# Patient Record
Sex: FEMALE | Race: ASIAN | Employment: FULL TIME | URBAN - METROPOLITAN AREA
[De-identification: names, ages, dates, MRNs, and addresses within clinical notes are randomized per-mention and may not be internally consistent; named-entity substitution may affect disease eponyms.]

---

## 2017-04-06 ENCOUNTER — TELEPHONE (OUTPATIENT)
Dept: INTERNAL MEDICINE CLINIC | Facility: CLINIC | Age: 67
End: 2017-04-06

## 2017-04-06 DIAGNOSIS — Z12.31 SCREENING MAMMOGRAM, ENCOUNTER FOR: Primary | ICD-10-CM

## 2017-04-06 DIAGNOSIS — IMO0001 UNCONTROLLED TYPE 2 DIABETES MELLITUS WITHOUT COMPLICATION, WITHOUT LONG-TERM CURRENT USE OF INSULIN: ICD-10-CM

## 2017-04-06 DIAGNOSIS — N95.1 MENOPAUSAL AND FEMALE CLIMACTERIC STATES: ICD-10-CM

## 2017-04-06 NOTE — TELEPHONE ENCOUNTER
I placed the orders. She had dexa order from 2015 she never went for and she still hasn't had the cholesterol that was ordered in September. She needs to have these labs fasting and she is due for f/u appt in May.

## 2017-04-06 NOTE — TELEPHONE ENCOUNTER
Patient would like orders for mammogram ,dexa scan,and a1c and all the other labs before her next appointment .  Call her when ready

## 2017-04-22 RX ORDER — TRIAMTERENE AND HYDROCHLOROTHIAZIDE 37.5; 25 MG/1; MG/1
TABLET ORAL
Qty: 90 TABLET | Refills: 0 | Status: SHIPPED | OUTPATIENT
Start: 2017-04-22 | End: 2017-09-26

## 2017-04-24 ENCOUNTER — TELEPHONE (OUTPATIENT)
Dept: INTERNAL MEDICINE CLINIC | Facility: CLINIC | Age: 67
End: 2017-04-24

## 2017-04-24 NOTE — TELEPHONE ENCOUNTER
US is not a recommended screening for breast cancer, particularly in a patient who has a h/o breast cancer. She needs to have the mammogram, can give her pain meds prior if she needs it.

## 2017-04-24 NOTE — TELEPHONE ENCOUNTER
Pt informed ultrasound is not recommended for pt with history of breast cancer, needs mammogram, pt states she will take aleve for pain. Orders mailed to pt as requested.

## 2017-04-27 ENCOUNTER — TELEPHONE (OUTPATIENT)
Dept: INTERNAL MEDICINE CLINIC | Facility: CLINIC | Age: 67
End: 2017-04-27

## 2017-04-27 ENCOUNTER — OFFICE VISIT (OUTPATIENT)
Dept: INTERNAL MEDICINE CLINIC | Facility: CLINIC | Age: 67
End: 2017-04-27

## 2017-04-27 ENCOUNTER — APPOINTMENT (OUTPATIENT)
Dept: LAB | Age: 67
End: 2017-04-27
Attending: INTERNAL MEDICINE
Payer: MEDICARE

## 2017-04-27 VITALS
TEMPERATURE: 98 F | DIASTOLIC BLOOD PRESSURE: 78 MMHG | HEIGHT: 62 IN | WEIGHT: 159 LBS | SYSTOLIC BLOOD PRESSURE: 136 MMHG | BODY MASS INDEX: 29.26 KG/M2 | OXYGEN SATURATION: 95 % | HEART RATE: 77 BPM

## 2017-04-27 DIAGNOSIS — R05.9 COUGH: ICD-10-CM

## 2017-04-27 DIAGNOSIS — J20.9 ACUTE BRONCHITIS, UNSPECIFIED ORGANISM: Primary | ICD-10-CM

## 2017-04-27 DIAGNOSIS — IMO0001 UNCONTROLLED TYPE 2 DIABETES MELLITUS WITHOUT COMPLICATION, WITHOUT LONG-TERM CURRENT USE OF INSULIN: ICD-10-CM

## 2017-04-27 DIAGNOSIS — E78.2 MIXED HYPERLIPIDEMIA: ICD-10-CM

## 2017-04-27 PROCEDURE — 36415 COLL VENOUS BLD VENIPUNCTURE: CPT

## 2017-04-27 PROCEDURE — 80061 LIPID PANEL: CPT

## 2017-04-27 PROCEDURE — 80053 COMPREHEN METABOLIC PANEL: CPT

## 2017-04-27 PROCEDURE — 83036 HEMOGLOBIN GLYCOSYLATED A1C: CPT

## 2017-04-27 PROCEDURE — 82043 UR ALBUMIN QUANTITATIVE: CPT

## 2017-04-27 PROCEDURE — 99214 OFFICE O/P EST MOD 30 MIN: CPT | Performed by: INTERNAL MEDICINE

## 2017-04-27 PROCEDURE — 82570 ASSAY OF URINE CREATININE: CPT

## 2017-04-27 PROCEDURE — G0463 HOSPITAL OUTPT CLINIC VISIT: HCPCS | Performed by: INTERNAL MEDICINE

## 2017-04-27 RX ORDER — BENZONATATE 100 MG/1
100 CAPSULE ORAL 3 TIMES DAILY PRN
Qty: 20 CAPSULE | Refills: 0 | Status: SHIPPED | OUTPATIENT
Start: 2017-04-27 | End: 2017-05-04

## 2017-04-27 RX ORDER — FENOFIBRATE 134 MG/1
CAPSULE ORAL
Qty: 90 CAPSULE | Refills: 0 | Status: SHIPPED | OUTPATIENT
Start: 2017-04-27 | End: 2017-09-18

## 2017-04-27 RX ORDER — AZITHROMYCIN 250 MG/1
TABLET, FILM COATED ORAL
Qty: 6 TABLET | Refills: 0 | Status: SHIPPED | OUTPATIENT
Start: 2017-04-27 | End: 2017-05-22

## 2017-04-27 NOTE — TELEPHONE ENCOUNTER
Patient calling and requesting antibiotic for cough, congestion, itchy/sore throat. Scheduled appointment with Dr. Sruthi Chávez for 5:15 p.m. today.

## 2017-04-27 NOTE — PROGRESS NOTES
HPI:    Patient ID: Manuel Zhang is a 73year old female. HPI   Pt comes in with complaint of cough, phlegm, sore throat for almost a week and not getting better.    Pt also had labs this am and triglicerides and cholesterol are up, pt days she had stopped examination of genitourinary organs 10/28/2013   • Lump or mass in breast 8/5/2011   • Allergic rhinitis 3/15/2011   • [facility], [facility] 4/27/2012   • Disorder of bone and cartilage 6/26/2012   • Vitamin B12 deficiency anemia 1/ exudate. Eyes: Conjunctivae and EOM are normal. Pupils are equal, round, and reactive to light. Neck: Normal range of motion. Neck supple. Cardiovascular: Normal rate, regular rhythm, normal heart sounds and intact distal pulses.     Pulmonary/Chest:

## 2017-04-27 NOTE — PATIENT INSTRUCTIONS
ASSESSMENT/PLAN:   Acute bronchitis, unspecified organism  (primary encounter diagnosis)- will cover with ab, take as prescribed with food  Cough- cough med,   Mixed hyperlipidemia- will restart the fenofibrate and watch dit we can recheck in 3 months

## 2017-04-28 NOTE — PROGRESS NOTES
Quick Note:    Labs normal, A1C better but the cholesterol is far worse, plase be sure she is taking medications, if no, needs to take, if yes, she will need something else  ______

## 2017-04-28 NOTE — PROGRESS NOTES
Quick Note:    Patient picked up rx for this month and per pharm, she needs to call insurance to see what is cheapest. Patient informed with verbal understanding.  She will call us back with what dosage is cheapest.  ______

## 2017-05-02 ENCOUNTER — TELEPHONE (OUTPATIENT)
Dept: INTERNAL MEDICINE CLINIC | Facility: CLINIC | Age: 67
End: 2017-05-02

## 2017-05-03 NOTE — TELEPHONE ENCOUNTER
Pt states that she is still taking the benzonatate, pt states that she is feeling much better today than she was yesterday and will call back if she feels worse.

## 2017-05-03 NOTE — TELEPHONE ENCOUNTER
She is also taking the tessalon?  Please see how she is, the cough can last longer than the infection

## 2017-05-22 ENCOUNTER — OFFICE VISIT (OUTPATIENT)
Dept: INTERNAL MEDICINE CLINIC | Facility: CLINIC | Age: 67
End: 2017-05-22

## 2017-05-22 VITALS
OXYGEN SATURATION: 96 % | SYSTOLIC BLOOD PRESSURE: 108 MMHG | WEIGHT: 154.81 LBS | TEMPERATURE: 99 F | HEART RATE: 88 BPM | HEIGHT: 62 IN | BODY MASS INDEX: 28.49 KG/M2 | DIASTOLIC BLOOD PRESSURE: 63 MMHG | RESPIRATION RATE: 18 BRPM

## 2017-05-22 DIAGNOSIS — IMO0001 UNCONTROLLED TYPE 2 DIABETES MELLITUS WITHOUT COMPLICATION, WITHOUT LONG-TERM CURRENT USE OF INSULIN: Primary | ICD-10-CM

## 2017-05-22 DIAGNOSIS — G89.29 CHRONIC BILATERAL THORACIC BACK PAIN: ICD-10-CM

## 2017-05-22 DIAGNOSIS — E78.2 MIXED HYPERLIPIDEMIA: ICD-10-CM

## 2017-05-22 DIAGNOSIS — Z12.31 VISIT FOR SCREENING MAMMOGRAM: ICD-10-CM

## 2017-05-22 DIAGNOSIS — I10 HYPERTENSION, BENIGN: ICD-10-CM

## 2017-05-22 DIAGNOSIS — M54.6 CHRONIC BILATERAL THORACIC BACK PAIN: ICD-10-CM

## 2017-05-22 PROCEDURE — G0463 HOSPITAL OUTPT CLINIC VISIT: HCPCS | Performed by: INTERNAL MEDICINE

## 2017-05-22 PROCEDURE — 99214 OFFICE O/P EST MOD 30 MIN: CPT | Performed by: INTERNAL MEDICINE

## 2017-05-22 RX ORDER — ATORVASTATIN CALCIUM 40 MG/1
40 TABLET, FILM COATED ORAL NIGHTLY
Qty: 90 TABLET | Refills: 1 | Status: SHIPPED | OUTPATIENT
Start: 2017-05-22 | End: 2018-04-15

## 2017-05-22 NOTE — PROGRESS NOTES
HPI:    Patient ID: Carmelina Ayala is a 73year old female. HPI  Diabetes  Patient here for follow up of Diabetes. Has been taking medications regularly.     Currently taking no medication, has been exercising more regularly  moderately active   Watches marciano few months. Worse when she wears new shoes. Not worse walking barefoot. Review of Systems   Respiratory: Negative for shortness of breath. Cardiovascular: Negative for chest pain, palpitations and leg swelling.    Gastrointestinal: Positive for heart

## 2017-05-22 NOTE — PATIENT INSTRUCTIONS
ASSESSMENT/PLAN:   Diagnoses and all orders for this visit:    Uncontrolled type 2 diabetes mellitus without complication, without long-term current use of insulin (HCC)  Sugar overall doing better, to continue diet and exercise.    Hypertension, benign  BP

## 2017-07-10 ENCOUNTER — TELEPHONE (OUTPATIENT)
Dept: INTERNAL MEDICINE CLINIC | Facility: CLINIC | Age: 67
End: 2017-07-10

## 2017-07-10 DIAGNOSIS — M25.579 ANKLE PAIN, UNSPECIFIED CHRONICITY, UNSPECIFIED LATERALITY: Primary | ICD-10-CM

## 2017-07-10 NOTE — TELEPHONE ENCOUNTER
patient is requesting to get a referal to see a foot doctor for right ankle pain  She mention she has seen you for this .

## 2017-07-11 ENCOUNTER — TELEPHONE (OUTPATIENT)
Dept: INTERNAL MEDICINE CLINIC | Facility: CLINIC | Age: 67
End: 2017-07-11

## 2017-07-11 DIAGNOSIS — M25.579 ANKLE PAIN, UNSPECIFIED CHRONICITY, UNSPECIFIED LATERALITY: Primary | ICD-10-CM

## 2017-07-11 NOTE — TELEPHONE ENCOUNTER
Per patient Dr. Michael Jones office states he does not work with ankles is there someone else you can refer to

## 2017-08-11 ENCOUNTER — HOSPITAL ENCOUNTER (OUTPATIENT)
Dept: MAMMOGRAPHY | Age: 67
Discharge: HOME | End: 2017-08-11
Attending: INTERNAL MEDICINE
Payer: MEDICARE

## 2017-08-11 ENCOUNTER — HOSPITAL ENCOUNTER (OUTPATIENT)
Dept: BONE DENSITY | Age: 67
Discharge: HOME | End: 2017-08-11
Attending: INTERNAL MEDICINE
Payer: MEDICARE

## 2017-08-11 DIAGNOSIS — N95.1 MENOPAUSAL AND FEMALE CLIMACTERIC STATES: ICD-10-CM

## 2017-08-11 DIAGNOSIS — Z12.31 VISIT FOR SCREENING MAMMOGRAM: ICD-10-CM

## 2017-08-11 PROCEDURE — 77080 DXA BONE DENSITY AXIAL: CPT | Performed by: INTERNAL MEDICINE

## 2017-08-11 PROCEDURE — 77067 SCR MAMMO BI INCL CAD: CPT | Performed by: INTERNAL MEDICINE

## 2017-08-21 ENCOUNTER — TELEPHONE (OUTPATIENT)
Dept: INTERNAL MEDICINE CLINIC | Facility: CLINIC | Age: 67
End: 2017-08-21

## 2017-08-21 NOTE — TELEPHONE ENCOUNTER
Spoke with patient regarding mammogram and bone density tests. Reminded patient to use \"My Chart\" for viewing test results. Patient verbalized understanding.

## 2017-09-18 ENCOUNTER — OFFICE VISIT (OUTPATIENT)
Dept: INTERNAL MEDICINE CLINIC | Facility: CLINIC | Age: 67
End: 2017-09-18

## 2017-09-18 VITALS
HEART RATE: 73 BPM | SYSTOLIC BLOOD PRESSURE: 124 MMHG | TEMPERATURE: 99 F | BODY MASS INDEX: 29.44 KG/M2 | DIASTOLIC BLOOD PRESSURE: 71 MMHG | HEIGHT: 62 IN | WEIGHT: 160 LBS

## 2017-09-18 DIAGNOSIS — R21 RASH: Primary | ICD-10-CM

## 2017-09-18 PROCEDURE — G0463 HOSPITAL OUTPT CLINIC VISIT: HCPCS | Performed by: INTERNAL MEDICINE

## 2017-09-18 PROCEDURE — 99213 OFFICE O/P EST LOW 20 MIN: CPT | Performed by: INTERNAL MEDICINE

## 2017-09-18 RX ORDER — CLOTRIMAZOLE AND BETAMETHASONE DIPROPIONATE 10; .64 MG/G; MG/G
CREAM TOPICAL
Qty: 60 G | Refills: 3 | Status: SHIPPED | OUTPATIENT
Start: 2017-09-18 | End: 2021-05-14

## 2017-09-18 RX ORDER — MELOXICAM 7.5 MG/1
TABLET ORAL
Refills: 1 | COMMUNITY
Start: 2017-07-14 | End: 2018-05-15

## 2017-09-18 NOTE — PROGRESS NOTES
HPI:    Patient ID: Delores Pena is a 78year old female. HPI she came in today complaining of a rash on around her neck.   She states this started approximately 1 month ago after she had a facial, her symptoms progressively got worse she used a gelacio Cream Apply bid Disp: 60 g Rfl: 3   atorvastatin 40 MG Oral Tab Take 1 tablet (40 mg total) by mouth nightly.  Disp: 90 tablet Rfl: 1   TRIAMTERENE-HCTZ 37.5-25 MG Oral Tab TAKE 1 TABLET BY MOUTH DAILY Disp: 90 tablet Rfl: 0   latanoprost (XALATAN) 0.005 % deficiency anemia 1/3/2008   • Vitamin D deficiency 10/31/2011      Past Surgical History:  9/27/16: HYSTERECTOMY      Comment: [facility] BSO hyperplasia, no CA  2010: LUMPECTOMY RIGHT  2011: KEARA BIOPSY STEREOTACTIC NODULE 2 SITE BILAT   Family History   Problem tenderness present. No tracheal deviation present. No thyroid mass and no thyromegaly present. Cardiovascular: Normal rate, regular rhythm, normal heart sounds and intact distal pulses. Exam reveals no gallop and no friction rub. No murmur heard.   Pu

## 2017-09-26 RX ORDER — TRIAMTERENE AND HYDROCHLOROTHIAZIDE 37.5; 25 MG/1; MG/1
TABLET ORAL
Qty: 90 TABLET | Refills: 0 | Status: SHIPPED | OUTPATIENT
Start: 2017-09-26 | End: 2017-12-31

## 2017-10-05 ENCOUNTER — TELEPHONE (OUTPATIENT)
Dept: INTERNAL MEDICINE CLINIC | Facility: CLINIC | Age: 67
End: 2017-10-05

## 2017-10-05 NOTE — TELEPHONE ENCOUNTER
Attempted to schedule a Medicare Annual Wellness Exam with PCP, Kandis ext [phone]. Patient eligible at this time. Thank you.

## 2017-11-03 ENCOUNTER — OFFICE VISIT (OUTPATIENT)
Dept: INTERNAL MEDICINE CLINIC | Facility: CLINIC | Age: 67
End: 2017-11-03

## 2017-11-03 ENCOUNTER — TELEPHONE (OUTPATIENT)
Dept: INTERNAL MEDICINE CLINIC | Facility: CLINIC | Age: 67
End: 2017-11-03

## 2017-11-03 VITALS
HEART RATE: 85 BPM | HEIGHT: 63.75 IN | DIASTOLIC BLOOD PRESSURE: 80 MMHG | WEIGHT: 160 LBS | SYSTOLIC BLOOD PRESSURE: 154 MMHG | BODY MASS INDEX: 27.65 KG/M2

## 2017-11-03 DIAGNOSIS — M25.512 ACUTE PAIN OF LEFT SHOULDER: Primary | ICD-10-CM

## 2017-11-03 PROCEDURE — 99213 OFFICE O/P EST LOW 20 MIN: CPT | Performed by: INTERNAL MEDICINE

## 2017-11-03 PROCEDURE — G0463 HOSPITAL OUTPT CLINIC VISIT: HCPCS | Performed by: INTERNAL MEDICINE

## 2017-11-03 RX ORDER — TIZANIDINE 2 MG/1
2 TABLET ORAL EVERY 6 HOURS PRN
Qty: 30 TABLET | Refills: 0 | Status: SHIPPED | OUTPATIENT
Start: 2017-11-03 | End: 2018-05-15

## 2017-11-03 NOTE — PATIENT INSTRUCTIONS
ASSESSMENT/PLAN:   Diagnoses and all orders for this visit:    Acute pain of left shoulder    Patient with acute pain left shoulder, may have been aggravated by activity yesterday at work. Recommend she report this at her work.  Will give her muscle relaxer

## 2017-11-03 NOTE — TELEPHONE ENCOUNTER
Patient c/o left arm/shoulder pain taking meloxicam without relief  Denies SOB, CP, Dizziness or jaw pain. Made appt for today.

## 2017-11-03 NOTE — PROGRESS NOTES
HPI:    Patient ID: Zackary Leon is a 78year old female. Shoulder Pain    The pain is present in the right shoulder. This is a new problem. The current episode started yesterday.  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 ASSESSMENT/PLAN:   Diagnoses and all orders for this visit:    Acute pain of left shoulder    Patient with acute pain left shoulder, may have been aggravated by activity yesterday at work.  Recommend she report this at her

## 2017-11-04 RX ORDER — TIZANIDINE 2 MG/1
TABLET ORAL
Qty: 385 TABLET | Refills: 0 | OUTPATIENT
Start: 2017-11-04

## 2017-11-06 ENCOUNTER — OFFICE VISIT (OUTPATIENT)
Dept: INTERNAL MEDICINE CLINIC | Facility: CLINIC | Age: 67
End: 2017-11-06

## 2017-11-06 VITALS
HEART RATE: 86 BPM | SYSTOLIC BLOOD PRESSURE: 131 MMHG | TEMPERATURE: 98 F | WEIGHT: 162 LBS | DIASTOLIC BLOOD PRESSURE: 74 MMHG | BODY MASS INDEX: 28 KG/M2

## 2017-11-06 DIAGNOSIS — L72.0 MILIA: ICD-10-CM

## 2017-11-06 DIAGNOSIS — I10 HYPERTENSION, BENIGN: ICD-10-CM

## 2017-11-06 DIAGNOSIS — E78.00 HYPERCHOLESTEROLEMIA: ICD-10-CM

## 2017-11-06 DIAGNOSIS — H40.9 GLAUCOMA, UNSPECIFIED GLAUCOMA TYPE, UNSPECIFIED LATERALITY: ICD-10-CM

## 2017-11-06 DIAGNOSIS — D51.9 ANEMIA DUE TO VITAMIN B12 DEFICIENCY, UNSPECIFIED B12 DEFICIENCY TYPE: ICD-10-CM

## 2017-11-06 DIAGNOSIS — L80 VITILIGO: ICD-10-CM

## 2017-11-06 DIAGNOSIS — IMO0001 UNCONTROLLED TYPE 2 DIABETES MELLITUS WITHOUT COMPLICATION, WITHOUT LONG-TERM CURRENT USE OF INSULIN: ICD-10-CM

## 2017-11-06 DIAGNOSIS — G47.30 SLEEP-DISORDERED BREATHING: ICD-10-CM

## 2017-11-06 DIAGNOSIS — J30.2 CHRONIC SEASONAL ALLERGIC RHINITIS, UNSPECIFIED TRIGGER: ICD-10-CM

## 2017-11-06 DIAGNOSIS — Z78.0 POSTMENOPAUSAL STATUS (AGE-RELATED) (NATURAL): ICD-10-CM

## 2017-11-06 DIAGNOSIS — E55.9 VITAMIN D DEFICIENCY: ICD-10-CM

## 2017-11-06 DIAGNOSIS — R76.11 POSITIVE PPD: ICD-10-CM

## 2017-11-06 DIAGNOSIS — D05.91: ICD-10-CM

## 2017-11-06 DIAGNOSIS — R93.89 ENDOMETRIAL THICKENING ON ULTRA SOUND: ICD-10-CM

## 2017-11-06 DIAGNOSIS — Z00.00 ENCOUNTER FOR ANNUAL HEALTH EXAMINATION: Primary | ICD-10-CM

## 2017-11-06 PROBLEM — R21 RASH: Status: RESOLVED | Noted: 2017-09-18 | Resolved: 2017-11-06

## 2017-11-06 PROCEDURE — 96160 PT-FOCUSED HLTH RISK ASSMT: CPT | Performed by: INTERNAL MEDICINE

## 2017-11-06 PROCEDURE — 90653 IIV ADJUVANT VACCINE IM: CPT | Performed by: INTERNAL MEDICINE

## 2017-11-06 PROCEDURE — G0008 ADMIN INFLUENZA VIRUS VAC: HCPCS | Performed by: INTERNAL MEDICINE

## 2017-11-06 NOTE — PATIENT INSTRUCTIONS
Beatriz Rosario's SCREENING SCHEDULE   Tests on this list are recommended by your physician but may not be covered, or covered at this frequency, by your insurer. Please check with your insurance carrier before scheduling to verify coverage.    REYNA (mg/dL)   Date Value   04/27/2017 260 (H)   07/18/2015 143     Triglycerides (mg/dL)   Date Value   04/27/2017 535 (H)   07/18/2015 129        EKG - covered if needed at Welcome to Medicare, and non-screening if indicated for medical reasons Electrocardiog anniversary or as ordered in After Visit Summary   Pap and Pelvic      Pap: Every 3 yrs age 21-65 or Pap+HPV every 5 yrs age 33-67, Covered every 2 yrs up to age 79 or Yearly if High Risk   There are no preventive care reminders to display for this patient Directives    [email or web address] org/publications/[email or web address]/personal_[email or web address]. pdf  An information packet, including necessary form from the [employer] website. http://[email or web address] [place]state. [place]/public/books/advin.htm  A link to the [place]

## 2017-11-06 NOTE — PROGRESS NOTES
HPI:   Pepper Moyer is a 78year old female who presents for a Medicare Initial Annual Wellness visit (Once after 12 month Medicare anniversary) .     Annual Physical due on 11/14/2017        Patient Care Team: Patient Care Team:  Sharla Rosario, mg total) by mouth every 6 (six) hours as needed.    TRIAMTERENE-HCTZ 37.5-25 MG Oral Tab TAKE 1 TABLET BY MOUTH DAILY   Meloxicam 7.5 MG Oral Tab    clotrimazole-betamethasone 1-0.05 % External Cream Apply bid   atorvastatin 40 MG Oral Tab Take 1 tablet (4 her father. SOCIAL HISTORY:   She  reports that she has never smoked. She has never used smokeless tobacco. She reports that she does not drink alcohol or use drugs.      REVIEW OF SYSTEMS:   GENERAL: feels well otherwise, c/o fatigue for 'a long time' (eva Hearing Assessment  (Required for AWV/SWV)    Hearing Screening    Screening Method:  Whisper Test  Whisper Test Result:  Pass          Visual Acuity  Right Eye Visual Acuity: Uncorrected Right Eye Chart Acuity: 20/25   Left Eye Visual Acuity: Uncorrected Pneumococcal, Zoster, Tetanus     Immunization History   Administered Date(s) Administered   • >=3 YRS FLUZONE TRI PRESERV FREE SINGLE DOSE (13762) FLU CLINIC 01/06/2015   • HIGH DOSE FLU 65 YRS AND OLDER PRSV FREE SINGLE D (35629) FLU CLINIC 11/14/2016 study.  Vitiligo  Small area of vitiligo on  the left arm will see Dr. Soniya Newton  Reassured patient as to the benign nature of these  Other orders  -     FLU VACCINE ADJUVANT IM         Ms. Rosario already takes aspirin and has it on her medication list. with daily activities? : No    Memory Problems?: No      Fall/Risk Assessment          Have you fallen in the last 12 months?: 0-No                                        Fall/Risk Scorin          Depression Screening (PHQ-2/PHQ-9): Over the LAST 2 WEE Hyperlipidemia, or Type II Diabetes Mellitus. Calculated LDL will not be reported when TG >400 mg/dl.      07/18/2015 72        EKG - w/ Initial Preventative Physical Exam only, or if medically necessary Electrocardiogram date09/14/2016       Colorectal Persons who live in the same house as a HepB virus carrier   Homosexual men   Illicit injectable drug abusers     Tetanus Toxoid  Only covered with a cut with metal- TD and TDaP Not covered by Medicare Part B No vaccine history found This may be covered flowsheet data found. Dilated Eye exam  Annually Data entered on: 11/14/2016   Last Dilated Eye Exam 11/14/2016     No flowsheet data found. COPD      Spirometry Testing Annually Spirometry date:  No flowsheet data found.          Template: POLY GAMLBE

## 2017-11-17 ENCOUNTER — TELEPHONE (OUTPATIENT)
Dept: INTERNAL MEDICINE CLINIC | Facility: CLINIC | Age: 67
End: 2017-11-17

## 2017-11-17 DIAGNOSIS — IMO0001 UNCONTROLLED TYPE 2 DIABETES MELLITUS WITHOUT COMPLICATION, WITHOUT LONG-TERM CURRENT USE OF INSULIN: Primary | ICD-10-CM

## 2017-12-07 ENCOUNTER — TELEPHONE (OUTPATIENT)
Dept: INTERNAL MEDICINE CLINIC | Facility: CLINIC | Age: 67
End: 2017-12-07

## 2017-12-07 RX ORDER — AMOXICILLIN 875 MG/1
875 TABLET, COATED ORAL 2 TIMES DAILY
Qty: 20 TABLET | Refills: 0 | Status: SHIPPED | OUTPATIENT
Start: 2017-12-07 | End: 2018-05-25 | Stop reason: ALTCHOICE

## 2017-12-07 NOTE — TELEPHONE ENCOUNTER
C/o sick x 5 days sore throat, hoarse, can hardly speak, no fever, cough, fatigue, OTC not helping.   Would like antibiotic sent to pharmacy

## 2017-12-08 NOTE — TELEPHONE ENCOUNTER
Prescription sent to pharmacy. Patient informed to finish full course of antibiotic treatment and to call office if not feeling better. Patient verbalized understanding, denies further questions.

## 2017-12-11 ENCOUNTER — HOSPITAL ENCOUNTER (OUTPATIENT)
Dept: GENERAL RADIOLOGY | Age: 67
Discharge: HOME | End: 2017-12-11
Attending: INTERNAL MEDICINE
Payer: MEDICARE

## 2017-12-11 ENCOUNTER — OFFICE VISIT (OUTPATIENT)
Dept: INTERNAL MEDICINE CLINIC | Facility: CLINIC | Age: 67
End: 2017-12-11

## 2017-12-11 VITALS
WEIGHT: 159 LBS | HEART RATE: 93 BPM | OXYGEN SATURATION: 95 % | TEMPERATURE: 99 F | DIASTOLIC BLOOD PRESSURE: 70 MMHG | HEIGHT: 63 IN | BODY MASS INDEX: 28.17 KG/M2 | SYSTOLIC BLOOD PRESSURE: 135 MMHG

## 2017-12-11 DIAGNOSIS — M79.675 GREAT TOE PAIN, LEFT: ICD-10-CM

## 2017-12-11 DIAGNOSIS — R05.9 COUGH: Primary | ICD-10-CM

## 2017-12-11 PROCEDURE — 99213 OFFICE O/P EST LOW 20 MIN: CPT | Performed by: INTERNAL MEDICINE

## 2017-12-11 PROCEDURE — G0463 HOSPITAL OUTPT CLINIC VISIT: HCPCS | Performed by: INTERNAL MEDICINE

## 2017-12-11 PROCEDURE — 73630 X-RAY EXAM OF FOOT: CPT | Performed by: INTERNAL MEDICINE

## 2017-12-11 RX ORDER — BENZONATATE 200 MG/1
200 CAPSULE ORAL 3 TIMES DAILY PRN
Qty: 20 CAPSULE | Refills: 0 | Status: SHIPPED | OUTPATIENT
Start: 2017-12-11 | End: 2018-05-25 | Stop reason: ALTCHOICE

## 2017-12-11 NOTE — PROGRESS NOTES
HPI:    Patient ID: Madi Newton is a 78year old female. Cough   This is a new problem. Episode onset: 1 week. The problem has been gradually improving.  The cough is non-productive (was initially productive, now better after starting the antibiotic take 1 tablet (81MG)  by ORAL route  every day Disp:  Rfl:      Allergies:  Latex                     PHYSICAL EXAM:    Physical Exam   Vitals reviewed. Constitutional: She appears well-developed and well-nourished.    Cardiovascular: Normal rate and regu

## 2017-12-11 NOTE — PATIENT INSTRUCTIONS
ASSESSMENT/PLAN:   Diagnoses and all orders for this visit:    Cough  Patient seems to be improving with the antibiotic. Will add cough med  Great toe pain, left  -     XR FOOT (2 VIEW), LEFT (CPT=73620);  Future  -     URIC ACID, SERUM; Future  Patient wit

## 2017-12-12 ENCOUNTER — TELEPHONE (OUTPATIENT)
Dept: INTERNAL MEDICINE CLINIC | Facility: CLINIC | Age: 67
End: 2017-12-12

## 2017-12-12 RX ORDER — INDOMETHACIN 25 MG/1
25 CAPSULE ORAL 3 TIMES DAILY PRN
Qty: 30 CAPSULE | Refills: 0 | Status: SHIPPED | OUTPATIENT
Start: 2017-12-12 | End: 2018-05-25

## 2017-12-12 NOTE — TELEPHONE ENCOUNTER
Pt has been informed of new medication and to stop the meloxicam and to ice the area, pt was also informed that if this does not help she would have to see ortho, pt voiced understanding.

## 2017-12-12 NOTE — TELEPHONE ENCOUNTER
Patient states she is in a lot of pain from her ankle meloxican is not working very painful and swollen

## 2017-12-12 NOTE — TELEPHONE ENCOUNTER
Recommend using ice to the area. Can change to indocin tid prn (stop the meloxicam).  If not better, would need to see ortho

## 2017-12-13 RX ORDER — INDOMETHACIN 25 MG/1
CAPSULE ORAL
Qty: 270 CAPSULE | Refills: 0 | OUTPATIENT
Start: 2017-12-13

## 2017-12-22 ENCOUNTER — PATIENT OUTREACH (OUTPATIENT)
Dept: CASE MANAGEMENT | Age: 67
End: 2017-12-22

## 2017-12-22 NOTE — PROGRESS NOTES
Outreached to patient in regards to enrollment to Chronic Care Management program. Patient stated that she is doing well and not on many meds so at this time patient \"Declined. \" I informed patient I will mail out letter for future reference and patient a

## 2018-01-01 RX ORDER — TRIAMTERENE AND HYDROCHLOROTHIAZIDE 37.5; 25 MG/1; MG/1
TABLET ORAL
Qty: 90 TABLET | Refills: 1 | Status: SHIPPED | OUTPATIENT
Start: 2018-01-01 | End: 2018-05-25

## 2018-01-03 ENCOUNTER — TELEPHONE (OUTPATIENT)
Dept: INTERNAL MEDICINE CLINIC | Facility: CLINIC | Age: 68
End: 2018-01-03

## 2018-01-03 DIAGNOSIS — R21 RASH: Primary | ICD-10-CM

## 2018-01-03 NOTE — TELEPHONE ENCOUNTER
Patient calling to request referral for Dr Immanuel Maria patient is seeing doctor for follow up rash white spots on face. Patient has appointment on Friday.

## 2018-02-15 ENCOUNTER — TELEPHONE (OUTPATIENT)
Dept: INTERNAL MEDICINE CLINIC | Facility: CLINIC | Age: 68
End: 2018-02-15

## 2018-02-15 DIAGNOSIS — M79.676 PAIN OF TOE, UNSPECIFIED LATERALITY: Primary | ICD-10-CM

## 2018-03-09 ENCOUNTER — TELEPHONE (OUTPATIENT)
Dept: INTERNAL MEDICINE CLINIC | Facility: CLINIC | Age: 68
End: 2018-03-09

## 2018-03-09 DIAGNOSIS — R42 VERTIGO: Primary | ICD-10-CM

## 2018-03-10 ENCOUNTER — LAB ENCOUNTER (OUTPATIENT)
Dept: LAB | Age: 68
End: 2018-03-10
Attending: INTERNAL MEDICINE
Payer: MEDICARE

## 2018-03-10 DIAGNOSIS — M79.675 GREAT TOE PAIN, LEFT: ICD-10-CM

## 2018-03-10 DIAGNOSIS — E55.9 VITAMIN D DEFICIENCY: ICD-10-CM

## 2018-03-10 DIAGNOSIS — E78.2 MIXED HYPERLIPIDEMIA: ICD-10-CM

## 2018-03-10 DIAGNOSIS — IMO0001 UNCONTROLLED TYPE 2 DIABETES MELLITUS WITHOUT COMPLICATION, WITHOUT LONG-TERM CURRENT USE OF INSULIN: ICD-10-CM

## 2018-03-10 DIAGNOSIS — D51.9 ANEMIA DUE TO VITAMIN B12 DEFICIENCY, UNSPECIFIED B12 DEFICIENCY TYPE: ICD-10-CM

## 2018-03-10 LAB
ALBUMIN SERPL BCP-MCNC: 4.4 G/DL (ref 3.5–4.8)
ALBUMIN/GLOB SERPL: 1.7 {RATIO} (ref 1–2)
ALP SERPL-CCNC: 70 U/L (ref 32–100)
ALT SERPL-CCNC: 26 U/L (ref 14–54)
ANION GAP SERPL CALC-SCNC: 10 MMOL/L (ref 0–18)
AST SERPL-CCNC: 28 U/L (ref 15–41)
BILIRUB SERPL-MCNC: 0.6 MG/DL (ref 0.3–1.2)
BUN SERPL-MCNC: 9 MG/DL (ref 8–20)
BUN/CREAT SERPL: 13.4 (ref 10–20)
CALCIUM SERPL-MCNC: 9.2 MG/DL (ref 8.5–10.5)
CHLORIDE SERPL-SCNC: 99 MMOL/L (ref 95–110)
CHOLEST SERPL-MCNC: 164 MG/DL (ref 110–200)
CO2 SERPL-SCNC: 27 MMOL/L (ref 22–32)
CREAT SERPL-MCNC: 0.67 MG/DL (ref 0.5–1.5)
CREAT UR-MCNC: 39.9 MG/DL
GLOBULIN PLAS-MCNC: 2.6 G/DL (ref 2.5–3.7)
GLUCOSE SERPL-MCNC: 104 MG/DL (ref 70–99)
HDLC SERPL-MCNC: 57 MG/DL
LDLC SERPL CALC-MCNC: 56 MG/DL (ref 0–99)
MICROALBUMIN UR-MCNC: 1.1 MG/DL (ref 0–1.8)
MICROALBUMIN/CREAT UR: 27.6 MG/G{CREAT} (ref 0–20)
NONHDLC SERPL-MCNC: 107 MG/DL
OSMOLALITY UR CALC.SUM OF ELEC: 281 MOSM/KG (ref 275–295)
PATIENT FASTING: YES
POTASSIUM SERPL-SCNC: 4.1 MMOL/L (ref 3.3–5.1)
PROT SERPL-MCNC: 7 G/DL (ref 5.9–8.4)
SODIUM SERPL-SCNC: 136 MMOL/L (ref 136–144)
TRIGL SERPL-MCNC: 257 MG/DL (ref 1–149)
URATE SERPL-MCNC: 8.2 MG/DL (ref 2.1–7.4)
VIT B12 SERPL-MCNC: 570 PG/ML (ref 181–914)

## 2018-03-10 PROCEDURE — 84550 ASSAY OF BLOOD/URIC ACID: CPT

## 2018-03-10 PROCEDURE — 82607 VITAMIN B-12: CPT

## 2018-03-10 PROCEDURE — 83036 HEMOGLOBIN GLYCOSYLATED A1C: CPT

## 2018-03-10 PROCEDURE — 36415 COLL VENOUS BLD VENIPUNCTURE: CPT

## 2018-03-10 PROCEDURE — 82306 VITAMIN D 25 HYDROXY: CPT

## 2018-03-10 PROCEDURE — 80061 LIPID PANEL: CPT

## 2018-03-10 PROCEDURE — 82570 ASSAY OF URINE CREATININE: CPT

## 2018-03-10 PROCEDURE — 80053 COMPREHEN METABOLIC PANEL: CPT

## 2018-03-10 PROCEDURE — 82043 UR ALBUMIN QUANTITATIVE: CPT

## 2018-03-11 LAB — HBA1C MFR BLD: 6.5 % (ref 4–6)

## 2018-03-12 LAB — 25(OH)D3 SERPL-MCNC: 26 NG/ML

## 2018-03-13 ENCOUNTER — TELEPHONE (OUTPATIENT)
Dept: INTERNAL MEDICINE CLINIC | Facility: CLINIC | Age: 68
End: 2018-03-13

## 2018-03-16 ENCOUNTER — TELEPHONE (OUTPATIENT)
Dept: INTERNAL MEDICINE CLINIC | Facility: CLINIC | Age: 68
End: 2018-03-16

## 2018-03-16 RX ORDER — INDOMETHACIN 25 MG/1
25 CAPSULE ORAL 3 TIMES DAILY PRN
Qty: 30 CAPSULE | Refills: 0 | Status: CANCELLED | OUTPATIENT
Start: 2018-03-16

## 2018-03-16 NOTE — TELEPHONE ENCOUNTER
Pt states that she continues to have some pain on her Lt big toe pain scale: 3 and still swollen. She already viewed gout test results via [facility] and is requesting medications.

## 2018-03-19 ENCOUNTER — TELEPHONE (OUTPATIENT)
Dept: INTERNAL MEDICINE CLINIC | Facility: CLINIC | Age: 68
End: 2018-03-19

## 2018-03-19 NOTE — TELEPHONE ENCOUNTER
Patient states Uric acid was high and podiatrist suggested she start medication which would need to be given by PCP. Please advise.

## 2018-03-20 NOTE — TELEPHONE ENCOUNTER
She can't start a medication for gout until she is at least 1-2 months over her attack, this can actually make her attack worse.  Should f/u in a month and we can review possible treatments

## 2018-03-20 NOTE — TELEPHONE ENCOUNTER
Patient informed should not take allopurinol until 1-2 months after gout attack. Made appt for 4/13/18 to follow up  Request lab results mailed to her done.

## 2018-03-20 NOTE — TELEPHONE ENCOUNTER
Can take up to 3 times daily with food but if no pain and just red, she doesn't need to continue.  OK to refill if needed

## 2018-04-15 RX ORDER — ATORVASTATIN CALCIUM 40 MG/1
TABLET, FILM COATED ORAL
Qty: 90 TABLET | Refills: 0 | Status: SHIPPED | OUTPATIENT
Start: 2018-04-15 | End: 2018-05-22

## 2018-04-15 NOTE — TELEPHONE ENCOUNTER
Refilled per Epic protocol.     Cholesterol Medications  Protocol Criteria:  · Appointment scheduled in the past 12 months or in the next 3 months  · ALT & LDL on file in the past 12 months  · ALT result < 80  · LDL result <130   Recent Outpatient Visits

## 2018-05-11 ENCOUNTER — PATIENT OUTREACH (OUTPATIENT)
Dept: INTERNAL MEDICINE CLINIC | Facility: CLINIC | Age: 68
End: 2018-05-11

## 2018-05-11 NOTE — PROGRESS NOTES
Patient is eligible for a 2018 Annual Medicare Health Assessment. Discussed in detail w/patient. Appt scheduled for 6/15/18.

## 2018-05-15 ENCOUNTER — OFFICE VISIT (OUTPATIENT)
Dept: INTERNAL MEDICINE CLINIC | Facility: CLINIC | Age: 68
End: 2018-05-15

## 2018-05-15 ENCOUNTER — TELEPHONE (OUTPATIENT)
Dept: INTERNAL MEDICINE CLINIC | Facility: CLINIC | Age: 68
End: 2018-05-15

## 2018-05-15 VITALS
TEMPERATURE: 98 F | BODY MASS INDEX: 28 KG/M2 | DIASTOLIC BLOOD PRESSURE: 63 MMHG | SYSTOLIC BLOOD PRESSURE: 134 MMHG | HEIGHT: 63.75 IN | WEIGHT: 162 LBS | HEART RATE: 77 BPM

## 2018-05-15 DIAGNOSIS — M54.50 ACUTE BILATERAL LOW BACK PAIN WITHOUT SCIATICA: Primary | ICD-10-CM

## 2018-05-15 DIAGNOSIS — T14.8XXA MUSCLE STRAIN: ICD-10-CM

## 2018-05-15 PROCEDURE — G0463 HOSPITAL OUTPT CLINIC VISIT: HCPCS | Performed by: INTERNAL MEDICINE

## 2018-05-15 PROCEDURE — 99214 OFFICE O/P EST MOD 30 MIN: CPT | Performed by: INTERNAL MEDICINE

## 2018-05-15 RX ORDER — MELOXICAM 7.5 MG/1
7.5 TABLET ORAL DAILY PRN
Qty: 30 TABLET | Refills: 0 | Status: SHIPPED | OUTPATIENT
Start: 2018-05-15 | End: 2018-05-15

## 2018-05-15 RX ORDER — CYCLOBENZAPRINE HCL 5 MG
5 TABLET ORAL 2 TIMES DAILY PRN
Qty: 30 TABLET | Refills: 0 | Status: SHIPPED | OUTPATIENT
Start: 2018-05-15 | End: 2018-05-30

## 2018-05-15 RX ORDER — PREDNISONE 1 MG/1
5 TABLET ORAL 2 TIMES DAILY
Qty: 10 TABLET | Refills: 0 | Status: SHIPPED | OUTPATIENT
Start: 2018-05-15 | End: 2018-05-20

## 2018-05-15 NOTE — TELEPHONE ENCOUNTER
Patient called c/o back pain x 2 days getting worse is 7 on pain scale. Denies numbness or tingling.  Asking for appt made appt for today

## 2018-05-15 NOTE — PROGRESS NOTES
HPI:    Patient ID: Dain Morales is a 78year old female. HPI   Patient comes in with complaint of low back pain she woke up with it bilateral side no radiation down the buttock she is worried that she has gout she has history of gout before.     Rev mouth 2 (two) times daily.  For 10 days Disp: 20 tablet Rfl: 0     Allergies:  Latex                       HISTORY:  Past Medical History:   Diagnosis Date   • Allergic rhinitis 3/15/2011   • Breast nodule 2010    stereotactic rt breast biopsy   • Breast no doing fine   • Breast Cancer Maternal Aunt    • Breast Cancer Self 62      Social History: Smoking status: Never Smoker                                                              Smokeless tobacco: Never Used                      Alcohol use:  No

## 2018-05-15 NOTE — PATIENT INSTRUCTIONS
ASSESSMENT/PLAN:   Acute bilateral low back pain without sciatica  (primary encounter diagnosis) will treat with steroids for a few days muscle relaxant warm compression let us know if not better  Muscle strain as above also meloxicam as needed no more channing

## 2018-05-16 ENCOUNTER — TELEPHONE (OUTPATIENT)
Dept: INTERNAL MEDICINE CLINIC | Facility: CLINIC | Age: 68
End: 2018-05-16

## 2018-05-16 RX ORDER — MELOXICAM 7.5 MG/1
TABLET ORAL
Qty: 90 TABLET | Refills: 0 | Status: SHIPPED | OUTPATIENT
Start: 2018-05-16 | End: 2018-05-22

## 2018-05-22 RX ORDER — MELOXICAM 7.5 MG/1
TABLET ORAL
Qty: 90 TABLET | Refills: 0 | Status: SHIPPED | OUTPATIENT
Start: 2018-05-22 | End: 2018-05-25

## 2018-05-22 RX ORDER — ATORVASTATIN CALCIUM 40 MG/1
TABLET, FILM COATED ORAL
Qty: 90 TABLET | Refills: 0 | Status: SHIPPED | OUTPATIENT
Start: 2018-05-22 | End: 2018-05-25

## 2018-05-24 ENCOUNTER — TELEPHONE (OUTPATIENT)
Dept: INTERNAL MEDICINE CLINIC | Facility: CLINIC | Age: 68
End: 2018-05-24

## 2018-05-25 RX ORDER — BLOOD-GLUCOSE METER
EACH MISCELLANEOUS
Qty: 1 DEVICE | Refills: 0 | Status: SHIPPED | OUTPATIENT
Start: 2018-05-25 | End: 2021-03-10

## 2018-05-25 RX ORDER — ATORVASTATIN CALCIUM 40 MG/1
TABLET, FILM COATED ORAL
Qty: 90 TABLET | Refills: 0 | Status: SHIPPED | OUTPATIENT
Start: 2018-05-25 | End: 2018-11-14

## 2018-05-25 RX ORDER — TRIAMTERENE AND HYDROCHLOROTHIAZIDE 37.5; 25 MG/1; MG/1
1 TABLET ORAL
Qty: 90 TABLET | Refills: 1 | Status: SHIPPED | OUTPATIENT
Start: 2018-05-25 | End: 2018-11-02

## 2018-05-25 RX ORDER — CALCIUM CITRATE/VITAMIN D3 200MG-6.25
TABLET ORAL
Qty: 100 STRIP | Refills: 3 | Status: SHIPPED | OUTPATIENT
Start: 2018-05-25 | End: 2020-05-07

## 2018-05-25 RX ORDER — MELOXICAM 7.5 MG/1
TABLET ORAL
Qty: 90 TABLET | Refills: 0 | Status: SHIPPED | OUTPATIENT
Start: 2018-05-25 | End: 2021-05-14

## 2018-05-30 ENCOUNTER — OFFICE VISIT (OUTPATIENT)
Dept: INTERNAL MEDICINE CLINIC | Facility: CLINIC | Age: 68
End: 2018-05-30

## 2018-05-30 VITALS
TEMPERATURE: 98 F | BODY MASS INDEX: 27 KG/M2 | OXYGEN SATURATION: 97 % | SYSTOLIC BLOOD PRESSURE: 112 MMHG | DIASTOLIC BLOOD PRESSURE: 63 MMHG | HEART RATE: 80 BPM | WEIGHT: 157 LBS

## 2018-05-30 DIAGNOSIS — E11.9 TYPE 2 DIABETES MELLITUS WITHOUT COMPLICATION, WITHOUT LONG-TERM CURRENT USE OF INSULIN (HCC): Primary | ICD-10-CM

## 2018-05-30 DIAGNOSIS — Z12.11 ENCOUNTER FOR SCREENING COLONOSCOPY: ICD-10-CM

## 2018-05-30 DIAGNOSIS — C50.911 HORMONE RECEPTOR POSITIVE BREAST CANCER, RIGHT (HCC): ICD-10-CM

## 2018-05-30 DIAGNOSIS — I10 HYPERTENSION, BENIGN: ICD-10-CM

## 2018-05-30 PROCEDURE — G0009 ADMIN PNEUMOCOCCAL VACCINE: HCPCS | Performed by: INTERNAL MEDICINE

## 2018-05-30 PROCEDURE — 90732 PPSV23 VACC 2 YRS+ SUBQ/IM: CPT | Performed by: INTERNAL MEDICINE

## 2018-05-30 PROCEDURE — G0463 HOSPITAL OUTPT CLINIC VISIT: HCPCS | Performed by: INTERNAL MEDICINE

## 2018-05-30 PROCEDURE — 99214 OFFICE O/P EST MOD 30 MIN: CPT | Performed by: INTERNAL MEDICINE

## 2018-05-30 RX ORDER — DIPHENHYDRAMINE HCL 25 MG
TABLET ORAL
COMMUNITY
Start: 2018-05-29 | End: 2018-05-30

## 2018-05-30 NOTE — PROGRESS NOTES
HPI:    Patient ID: Jose J Rojo is a 78year old female. HPI  Diabetes  Patient here for follow up of Diabetes. Has been taking medications regularly. Currently taking no meds  Checks sugars 1 times daily. Fasting sugars average low 100s.    ex METRIX AIR GLUCOSE METER) Does not apply Device Test sugar daily Disp: 1 Device Rfl: 0   Glucose Blood (TRUE METRIX BLOOD GLUCOSE TEST) In Vitro Strip Test sugar daily Disp: 100 strip Rfl: 3   clotrimazole-betamethasone 1-0.05 % External Cream Apply bid Aki Vtial

## 2018-05-30 NOTE — PATIENT INSTRUCTIONS
ASSESSMENT/PLAN:   Hypertension, benign  BP overall doing very well, continue current meds and regular exercise. Type II diabetes mellitus, uncontrolled ([address]  Patient overall doing well with her sugar doing regular exercise and watching her diet.

## 2018-06-12 PROBLEM — E78.5 TYPE 2 DIABETES MELLITUS WITH HYPERLIPIDEMIA (HCC): Status: ACTIVE | Noted: 2018-06-12

## 2018-06-12 PROBLEM — E11.69 TYPE 2 DIABETES MELLITUS WITH HYPERLIPIDEMIA (HCC): Status: ACTIVE | Noted: 2018-06-12

## 2018-06-12 PROBLEM — E78.5 TYPE 2 DIABETES MELLITUS WITH HYPERLIPIDEMIA: Status: ACTIVE | Noted: 2018-06-12

## 2018-06-12 PROBLEM — E11.69 TYPE 2 DIABETES MELLITUS WITH HYPERLIPIDEMIA  (HCC): Status: ACTIVE | Noted: 2018-06-12

## 2018-06-12 PROBLEM — M47.816 LUMBAR ARTHROPATHY: Status: ACTIVE | Noted: 2018-06-12

## 2018-06-12 PROBLEM — E11.69 TYPE 2 DIABETES MELLITUS WITH HYPERLIPIDEMIA: Status: ACTIVE | Noted: 2018-06-12

## 2018-06-12 PROBLEM — I70.0 ATHEROSCLEROSIS OF AORTA (HCC): Status: ACTIVE | Noted: 2018-06-12

## 2018-06-12 PROBLEM — E79.0 HYPERURICEMIA: Status: ACTIVE | Noted: 2018-06-12

## 2018-06-12 PROBLEM — E78.5 TYPE 2 DIABETES MELLITUS WITH HYPERLIPIDEMIA  (HCC): Status: ACTIVE | Noted: 2018-06-12

## 2018-06-12 PROBLEM — I70.0 ATHEROSCLEROSIS OF AORTA: Status: ACTIVE | Noted: 2018-06-12

## 2018-08-03 ENCOUNTER — TELEPHONE (OUTPATIENT)
Dept: INTERNAL MEDICINE CLINIC | Facility: CLINIC | Age: 68
End: 2018-08-03

## 2018-08-03 ENCOUNTER — HOSPITAL ENCOUNTER (OUTPATIENT)
Dept: GENERAL RADIOLOGY | Age: 68
Discharge: HOME | End: 2018-08-03
Attending: INTERNAL MEDICINE
Payer: MEDICARE

## 2018-08-03 DIAGNOSIS — R76.11 POSITIVE PPD: ICD-10-CM

## 2018-08-03 PROCEDURE — 71046 X-RAY EXAM CHEST 2 VIEWS: CPT | Performed by: INTERNAL MEDICINE

## 2018-08-03 NOTE — TELEPHONE ENCOUNTER
Per verbal request from patient requesting AVS from 5- to be faxed to her employers.     [facility]  [facility]

## 2018-08-13 ENCOUNTER — TELEPHONE (OUTPATIENT)
Dept: INTERNAL MEDICINE CLINIC | Facility: CLINIC | Age: 68
End: 2018-08-13

## 2018-08-13 NOTE — TELEPHONE ENCOUNTER
Patient called requesting to speak to nurse regarding when she was diagnosed with breast cancer, she is purchasing insurance and needs the dates.     Patient also requested a copy of her last chest xray report, she signed the nabil form

## 2018-08-14 NOTE — TELEPHONE ENCOUNTER
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 She had it operated on. I'm not sure what she means by when she went into remission.  We don't note dates like that

## 2018-11-02 ENCOUNTER — OFFICE VISIT (OUTPATIENT)
Dept: INTERNAL MEDICINE CLINIC | Facility: CLINIC | Age: 68
End: 2018-11-02
Payer: MEDICARE

## 2018-11-02 VITALS
BODY MASS INDEX: 27.64 KG/M2 | SYSTOLIC BLOOD PRESSURE: 123 MMHG | HEART RATE: 94 BPM | WEIGHT: 156 LBS | HEIGHT: 63 IN | DIASTOLIC BLOOD PRESSURE: 65 MMHG | RESPIRATION RATE: 12 BRPM | TEMPERATURE: 99 F

## 2018-11-02 DIAGNOSIS — I70.0 ATHEROSCLEROSIS OF AORTA (HCC): ICD-10-CM

## 2018-11-02 DIAGNOSIS — C50.911 HORMONE RECEPTOR POSITIVE BREAST CANCER, RIGHT (HCC): ICD-10-CM

## 2018-11-02 DIAGNOSIS — N64.4 BREAST PAIN, RIGHT: ICD-10-CM

## 2018-11-02 DIAGNOSIS — Z12.11 COLON CANCER SCREENING: ICD-10-CM

## 2018-11-02 DIAGNOSIS — E55.9 VITAMIN D DEFICIENCY: ICD-10-CM

## 2018-11-02 DIAGNOSIS — E11.9 TYPE 2 DIABETES MELLITUS WITHOUT COMPLICATION, WITHOUT LONG-TERM CURRENT USE OF INSULIN (HCC): Primary | ICD-10-CM

## 2018-11-02 DIAGNOSIS — I10 HYPERTENSION, BENIGN: ICD-10-CM

## 2018-11-02 DIAGNOSIS — E78.00 HYPERCHOLESTEROLEMIA: ICD-10-CM

## 2018-11-02 PROCEDURE — 90653 IIV ADJUVANT VACCINE IM: CPT | Performed by: INTERNAL MEDICINE

## 2018-11-02 PROCEDURE — 99215 OFFICE O/P EST HI 40 MIN: CPT | Performed by: INTERNAL MEDICINE

## 2018-11-02 PROCEDURE — G0008 ADMIN INFLUENZA VIRUS VAC: HCPCS | Performed by: INTERNAL MEDICINE

## 2018-11-02 RX ORDER — LOSARTAN POTASSIUM 25 MG/1
25 TABLET ORAL DAILY
Qty: 90 TABLET | Refills: 0 | Status: SHIPPED | OUTPATIENT
Start: 2018-11-02 | End: 2018-11-19 | Stop reason: ALTCHOICE

## 2018-11-02 NOTE — PROGRESS NOTES
HPI:    Patient ID: Sherry Brown is a 80year old female. Diabetes   She presents for her initial diabetic visit. She has type 2 diabetes mellitus. Her disease course has been stable. There are no hypoglycemic associated symptoms.  There are no diabe include diabetes. She has no history of chronic renal disease or hypothyroidism. Factors aggravating her hyperlipidemia include thiazides. Pertinent negatives include no chest pain or shortness of breath.  Current antihyperlipidemic treatment includes stati HENT:   Head: Normocephalic and atraumatic. Right Ear: External ear normal.   Left Ear: External ear normal.   Nose: Nose normal.   Mouth/Throat: Oropharynx is clear and moist. No oropharyngeal exudate.    Eyes: Conjunctivae and EOM are normal. Pupils a lipid panel and CMP.   Continue low-fat low-cholesterol diet and current cholesterol medication.    (I10) Hypertension, benign  Plan: Blood pressures been controlled with diuretic however the patient is diabetic so discussed with the patient that ideally sh

## 2018-11-05 ENCOUNTER — TELEPHONE (OUTPATIENT)
Dept: OTHER | Age: 68
End: 2018-11-05

## 2018-11-05 DIAGNOSIS — N64.4 BREAST PAIN, RIGHT: Primary | ICD-10-CM

## 2018-11-05 NOTE — TELEPHONE ENCOUNTER
Referral for Dr Tamia Clark signed;  I told pt on last ov that surgeon will be the one to decide what test for her breast pain will be appropriate so I didn't sign the order for mri breast.

## 2018-11-05 NOTE — TELEPHONE ENCOUNTER
Relayed Dr. Gwen Rowell message to patient who verbalized understanding. Gave patient number to make apt with Dr. Galilea Silva and put referral in the mail for her records.

## 2018-11-05 NOTE — TELEPHONE ENCOUNTER
Hi Dr. Patria Riley,     Pt calling stating Dr. Horacio Akers (who you referred patient to) is retired. Advised patient that   Dr. Latrice Nixon and Dr Sherif James are 2 other great surgeons in the same office Dr. Horacio Akers practiced in.   Updated referral pending for you to sign

## 2018-11-09 ENCOUNTER — OFFICE VISIT (OUTPATIENT)
Dept: SURGERY | Facility: CLINIC | Age: 68
End: 2018-11-09

## 2018-11-09 VITALS — HEIGHT: 63 IN | BODY MASS INDEX: 27.64 KG/M2 | WEIGHT: 156 LBS

## 2018-11-09 DIAGNOSIS — N64.4 BREAST PAIN, RIGHT: Primary | ICD-10-CM

## 2018-11-09 DIAGNOSIS — Z86.000 HISTORY OF DUCTAL CARCINOMA IN SITU (DCIS) OF BREAST: ICD-10-CM

## 2018-11-09 PROCEDURE — 99204 OFFICE O/P NEW MOD 45 MIN: CPT | Performed by: SURGERY

## 2018-11-09 PROCEDURE — G0463 HOSPITAL OUTPT CLINIC VISIT: HCPCS | Performed by: SURGERY

## 2018-11-09 NOTE — PROGRESS NOTES
History and Physical      Jose J Rojo is a 80year old female. HPI   Patient presents with:  Breast Lump: Patient states she had had the pain for over one month. Denies redness, swelling or nipple drainage.   State she had pain and occasional spa 1-0.05 % External Cream Apply bid Disp: 60 g Rfl: 3   latanoprost (XALATAN) 0.005 % Ophthalmic Solution  Disp:  Rfl: 3   Calcium Carbonate-Vitamin D 600-200 MG-UNIT Oral Cap Take  by mouth. 1 po q12hrs.  Disp:  Rfl:    aspirin (ASPIRIN ADULT LOW STRENGTH) 8 membranes are moist no oral lesions are noted  Neck/Thyroid: neck is supple without adenopathy  Respiratory: normal to inspection lungs are clear to auscultation bilaterally normal respiratory effort  Cardiovascular: regular rate and rhythm no murmurs, gal

## 2018-11-15 RX ORDER — ATORVASTATIN CALCIUM 40 MG/1
TABLET, FILM COATED ORAL
Qty: 90 TABLET | Refills: 0 | Status: SHIPPED | OUTPATIENT
Start: 2018-11-15 | End: 2019-07-08

## 2018-11-15 NOTE — TELEPHONE ENCOUNTER
Pt established care with Dr. Taurus Zeng, Atorvastatin Rx filled per protocol. No further action required at this time.     Cholesterol Medications  Protocol Criteria:  · Appointment scheduled in the past 12 months or in the next 3 months  · ALT & LDL on steven

## 2018-11-19 ENCOUNTER — NURSE TRIAGE (OUTPATIENT)
Dept: OTHER | Age: 68
End: 2018-11-19

## 2018-11-19 RX ORDER — LOSARTAN POTASSIUM AND HYDROCHLOROTHIAZIDE 12.5; 5 MG/1; MG/1
1 TABLET ORAL DAILY
Qty: 30 TABLET | Refills: 0 | Status: SHIPPED | OUTPATIENT
Start: 2018-11-19 | End: 2019-01-15

## 2018-11-19 RX ORDER — LOSARTAN POTASSIUM AND HYDROCHLOROTHIAZIDE 12.5; 5 MG/1; MG/1
1 TABLET ORAL DAILY
Qty: 90 TABLET | Refills: 0 | OUTPATIENT
Start: 2018-11-19

## 2018-11-19 NOTE — TELEPHONE ENCOUNTER
Action Requested: Summary for Provider     []  Critical Lab, Recommendations Needed  [] Need Additional Advice  []   FYI    []   Need Orders  [] Need Medications Sent to Pharmacy  []  Other     SUMMARY: Will Zhu pt stated that this weekend her b/p nancy

## 2018-11-19 NOTE — TELEPHONE ENCOUNTER
Would recommend to change her losartan to losartan HCT 50mg/12.5mg po daily #30, ffup in clinic in 4 weeks.

## 2018-12-13 ENCOUNTER — HOSPITAL ENCOUNTER (OUTPATIENT)
Dept: ULTRASOUND IMAGING | Facility: HOSPITAL | Age: 68
Discharge: HOME | End: 2018-12-13
Attending: SURGERY
Payer: MEDICARE

## 2018-12-13 ENCOUNTER — HOSPITAL ENCOUNTER (OUTPATIENT)
Dept: MAMMOGRAPHY | Facility: HOSPITAL | Age: 68
Discharge: HOME | End: 2018-12-13
Attending: SURGERY
Payer: MEDICARE

## 2018-12-13 DIAGNOSIS — Z86.000 HISTORY OF DUCTAL CARCINOMA IN SITU (DCIS) OF BREAST: ICD-10-CM

## 2018-12-13 DIAGNOSIS — N64.4 BREAST PAIN, RIGHT: ICD-10-CM

## 2018-12-13 PROCEDURE — 77066 DX MAMMO INCL CAD BI: CPT | Performed by: SURGERY

## 2018-12-13 PROCEDURE — 77062 BREAST TOMOSYNTHESIS BI: CPT | Performed by: SURGERY

## 2018-12-13 PROCEDURE — 76642 ULTRASOUND BREAST LIMITED: CPT | Performed by: SURGERY

## 2019-01-14 ENCOUNTER — PATIENT OUTREACH (OUTPATIENT)
Dept: CASE MANAGEMENT | Age: 69
End: 2019-01-14

## 2019-01-16 RX ORDER — LOSARTAN POTASSIUM AND HYDROCHLOROTHIAZIDE 12.5; 5 MG/1; MG/1
1 TABLET ORAL DAILY
Qty: 30 TABLET | Refills: 2 | Status: SHIPPED | OUTPATIENT
Start: 2019-01-16 | End: 2019-03-07

## 2019-01-16 NOTE — TELEPHONE ENCOUNTER
Review pended refill request as it does not fall under a protocol.   Sent to provider, No protocol associated with med in Rx folder    Requested Prescriptions     Pending Prescriptions Disp Refills   • LOSARTAN POTASSIUM-HCTZ 50-12.5 MG Oral Tab [Pharmacy M

## 2019-01-21 NOTE — PROGRESS NOTES
Unable to reach pt, when calling phone goes straight to voicemail. Will continue outreaching to intro CCM services.

## 2019-02-11 ENCOUNTER — PATIENT OUTREACH (OUTPATIENT)
Dept: CASE MANAGEMENT | Age: 69
End: 2019-02-11

## 2019-02-11 NOTE — PROGRESS NOTES
Unable to reach pt, phone goes straight to VM. Sending some info in the mail and will f/up at a later time.

## 2019-03-05 ENCOUNTER — NURSE TRIAGE (OUTPATIENT)
Dept: INTERNAL MEDICINE CLINIC | Facility: CLINIC | Age: 69
End: 2019-03-05

## 2019-03-05 NOTE — TELEPHONE ENCOUNTER
Action Requested: Summary for Provider     []  Critical Lab, Recommendations Needed  [] Need Additional Advice  []   FYI    []   Need Orders  [] Need Medications Sent to Pharmacy  []  Other     SUMMARY: Appt scheduled for Thurs 3/7 8am with Dr Jeffrey Salas, for symp

## 2019-03-07 ENCOUNTER — LAB ENCOUNTER (OUTPATIENT)
Dept: LAB | Facility: HOSPITAL | Age: 69
End: 2019-03-07
Attending: INTERNAL MEDICINE
Payer: MEDICARE

## 2019-03-07 ENCOUNTER — OFFICE VISIT (OUTPATIENT)
Dept: INTERNAL MEDICINE CLINIC | Facility: CLINIC | Age: 69
End: 2019-03-07

## 2019-03-07 ENCOUNTER — HOSPITAL ENCOUNTER (OUTPATIENT)
Dept: CT IMAGING | Facility: HOSPITAL | Age: 69
Discharge: HOME | End: 2019-03-07
Attending: INTERNAL MEDICINE | Admitting: INTERNAL MEDICINE
Payer: MEDICARE

## 2019-03-07 VITALS
DIASTOLIC BLOOD PRESSURE: 90 MMHG | WEIGHT: 156 LBS | BODY MASS INDEX: 27.64 KG/M2 | TEMPERATURE: 98 F | SYSTOLIC BLOOD PRESSURE: 140 MMHG | HEIGHT: 63 IN | HEART RATE: 85 BPM

## 2019-03-07 DIAGNOSIS — R10.31 ABDOMINAL PAIN, RLQ (RIGHT LOWER QUADRANT): ICD-10-CM

## 2019-03-07 DIAGNOSIS — R10.31 ABDOMINAL PAIN, RLQ (RIGHT LOWER QUADRANT): Primary | ICD-10-CM

## 2019-03-07 LAB
ALBUMIN SERPL-MCNC: 4.2 G/DL (ref 3.4–5)
ALBUMIN/GLOB SERPL: 1.1 {RATIO} (ref 1–2)
ALP LIVER SERPL-CCNC: 83 U/L (ref 55–142)
ALT SERPL-CCNC: 34 U/L (ref 13–56)
ANION GAP SERPL CALC-SCNC: 5 MMOL/L (ref 0–18)
AST SERPL-CCNC: 22 U/L (ref 15–37)
BASOPHILS # BLD AUTO: 0.05 X10(3) UL (ref 0–0.2)
BASOPHILS NFR BLD AUTO: 0.7 %
BILIRUB SERPL-MCNC: 0.4 MG/DL (ref 0.1–2)
BILIRUB UR QL: NEGATIVE
BILIRUB UR QL: NEGATIVE
BUN BLD-MCNC: 15 MG/DL (ref 7–18)
BUN/CREAT SERPL: 18.3 (ref 10–20)
CALCIUM BLD-MCNC: 9.8 MG/DL (ref 8.5–10.1)
CHLORIDE SERPL-SCNC: 107 MMOL/L (ref 98–107)
CLARITY UR: CLEAR
CLARITY UR: CLEAR
CO2 SERPL-SCNC: 29 MMOL/L (ref 21–32)
COLOR UR: YELLOW
COLOR UR: YELLOW
CREAT BLD-MCNC: 0.82 MG/DL (ref 0.55–1.02)
DEPRECATED RDW RBC AUTO: 48.9 FL (ref 35.1–46.3)
EOSINOPHIL # BLD AUTO: 0.12 X10(3) UL (ref 0–0.7)
EOSINOPHIL NFR BLD AUTO: 1.6 %
ERYTHROCYTE [DISTWIDTH] IN BLOOD BY AUTOMATED COUNT: 14.8 % (ref 11–15)
GLOBULIN PLAS-MCNC: 3.7 G/DL (ref 2.8–4.4)
GLUCOSE BLD-MCNC: 97 MG/DL (ref 70–99)
GLUCOSE UR-MCNC: NEGATIVE MG/DL
GLUCOSE UR-MCNC: NEGATIVE MG/DL
HCT VFR BLD AUTO: 40.5 % (ref 35–48)
HGB BLD-MCNC: 13.4 G/DL (ref 12–16)
HGB UR QL STRIP.AUTO: NEGATIVE
HGB UR QL STRIP.AUTO: NEGATIVE
IMM GRANULOCYTES # BLD AUTO: 0.01 X10(3) UL (ref 0–1)
IMM GRANULOCYTES NFR BLD: 0.1 %
KETONES UR-MCNC: NEGATIVE MG/DL
KETONES UR-MCNC: NEGATIVE MG/DL
LEUKOCYTE ESTERASE UR QL STRIP.AUTO: NEGATIVE
LEUKOCYTE ESTERASE UR QL STRIP.AUTO: NEGATIVE
LIPASE SERPL-CCNC: 118 U/L (ref 73–393)
LYMPHOCYTES # BLD AUTO: 3.41 X10(3) UL (ref 1–4)
LYMPHOCYTES NFR BLD AUTO: 44.3 %
M PROTEIN MFR SERPL ELPH: 7.9 G/DL (ref 6.4–8.2)
MCH RBC QN AUTO: 29.8 PG (ref 26–34)
MCHC RBC AUTO-ENTMCNC: 33.1 G/DL (ref 31–37)
MCV RBC AUTO: 90.2 FL (ref 80–100)
MONOCYTES # BLD AUTO: 0.49 X10(3) UL (ref 0.1–1)
MONOCYTES NFR BLD AUTO: 6.4 %
NEUTROPHILS # BLD AUTO: 3.61 X10 (3) UL (ref 1.5–7.7)
NEUTROPHILS # BLD AUTO: 3.61 X10(3) UL (ref 1.5–7.7)
NEUTROPHILS NFR BLD AUTO: 46.9 %
NITRITE UR QL STRIP.AUTO: NEGATIVE
NITRITE UR QL STRIP.AUTO: NEGATIVE
OSMOLALITY SERPL CALC.SUM OF ELEC: 293 MOSM/KG (ref 275–295)
PH UR: 5 [PH] (ref 5–8)
PH UR: 6 [PH] (ref 5–8)
PLATELET # BLD AUTO: 328 10(3)UL (ref 150–450)
POTASSIUM SERPL-SCNC: 4 MMOL/L (ref 3.5–5.1)
PROT UR-MCNC: NEGATIVE MG/DL
PROT UR-MCNC: NEGATIVE MG/DL
RBC # BLD AUTO: 4.49 X10(6)UL (ref 3.8–5.3)
SODIUM SERPL-SCNC: 141 MMOL/L (ref 136–145)
SP GR UR STRIP: 1.02 (ref 1–1.03)
SP GR UR STRIP: 1.02 (ref 1–1.03)
UROBILINOGEN UR STRIP-ACNC: <2
UROBILINOGEN UR STRIP-ACNC: <2
VIT C UR-MCNC: NEGATIVE MG/DL
VIT C UR-MCNC: NEGATIVE MG/DL
WBC # BLD AUTO: 7.7 X10(3) UL (ref 4–11)

## 2019-03-07 PROCEDURE — 85025 COMPLETE CBC W/AUTO DIFF WBC: CPT

## 2019-03-07 PROCEDURE — 74177 CT ABD & PELVIS W/CONTRAST: CPT | Performed by: INTERNAL MEDICINE

## 2019-03-07 PROCEDURE — 81003 URINALYSIS AUTO W/O SCOPE: CPT

## 2019-03-07 PROCEDURE — 80053 COMPREHEN METABOLIC PANEL: CPT

## 2019-03-07 PROCEDURE — 99214 OFFICE O/P EST MOD 30 MIN: CPT | Performed by: INTERNAL MEDICINE

## 2019-03-07 PROCEDURE — 83690 ASSAY OF LIPASE: CPT

## 2019-03-07 PROCEDURE — 36415 COLL VENOUS BLD VENIPUNCTURE: CPT

## 2019-03-07 PROCEDURE — G0463 HOSPITAL OUTPT CLINIC VISIT: HCPCS | Performed by: INTERNAL MEDICINE

## 2019-03-07 RX ORDER — LOSARTAN POTASSIUM 100 MG/1
100 TABLET ORAL DAILY
Qty: 90 TABLET | Refills: 0 | Status: SHIPPED | OUTPATIENT
Start: 2019-03-07 | End: 2019-05-31

## 2019-03-07 RX ORDER — HYDROCHLOROTHIAZIDE 12.5 MG/1
12.5 TABLET ORAL DAILY
Qty: 90 TABLET | Refills: 0 | Status: SHIPPED | OUTPATIENT
Start: 2019-03-07 | End: 2019-05-15

## 2019-03-07 NOTE — PROGRESS NOTES
HPI:    Patient ID: Gregorio Dad is a 80year old female. Abdominal Pain   This is a new problem. The current episode started 1 to 4 weeks ago (more than a week ago). The onset quality is sudden. The problem occurs intermittently.  The problem has be METRIX BLOOD GLUCOSE TEST) In Vitro Strip Test sugar daily Disp: 100 strip Rfl: 3   clotrimazole-betamethasone 1-0.05 % External Cream Apply bid Disp: 60 g Rfl: 3   latanoprost (XALATAN) 0.005 % Ophthalmic Solution  Disp:  Rfl: 3   Calcium Carbonate-Vitami Lymphadenopathy:     She has no cervical adenopathy. Neurological: She is alert and oriented to person, place, and time. Skin: Skin is warm and dry. No rash noted. She is not diaphoretic. Psychiatric: She has a normal mood and affect.

## 2019-03-08 PROBLEM — R10.31 ABDOMINAL PAIN, RLQ (RIGHT LOWER QUADRANT): Status: ACTIVE | Noted: 2019-03-08

## 2019-03-22 ENCOUNTER — TELEPHONE (OUTPATIENT)
Dept: CASE MANAGEMENT | Age: 69
End: 2019-03-22

## 2019-03-22 DIAGNOSIS — H02.829 MILIA OF EYELID, UNSPECIFIED LATERALITY: Primary | ICD-10-CM

## 2019-03-22 NOTE — TELEPHONE ENCOUNTER
Hi Dr. Krystina Vieira would like a referral to see a derm for milia's all over her body.     If you agree can you please sign referral    Thank you  Luiza humphreys

## 2019-04-05 ENCOUNTER — APPOINTMENT (OUTPATIENT)
Dept: LAB | Age: 69
End: 2019-04-05
Attending: INTERNAL MEDICINE
Payer: MEDICARE

## 2019-04-05 ENCOUNTER — OFFICE VISIT (OUTPATIENT)
Dept: INTERNAL MEDICINE CLINIC | Facility: CLINIC | Age: 69
End: 2019-04-05

## 2019-04-05 VITALS
BODY MASS INDEX: 27.66 KG/M2 | SYSTOLIC BLOOD PRESSURE: 126 MMHG | DIASTOLIC BLOOD PRESSURE: 76 MMHG | RESPIRATION RATE: 18 BRPM | TEMPERATURE: 98 F | HEART RATE: 77 BPM | HEIGHT: 63 IN | WEIGHT: 156.13 LBS | OXYGEN SATURATION: 98 %

## 2019-04-05 DIAGNOSIS — IMO0001 UNCONTROLLED TYPE 2 DIABETES MELLITUS WITHOUT COMPLICATION, WITHOUT LONG-TERM CURRENT USE OF INSULIN: ICD-10-CM

## 2019-04-05 DIAGNOSIS — Z00.00 MEDICARE ANNUAL WELLNESS VISIT, SUBSEQUENT: ICD-10-CM

## 2019-04-05 DIAGNOSIS — C50.911 HORMONE RECEPTOR POSITIVE BREAST CANCER, RIGHT (HCC): ICD-10-CM

## 2019-04-05 DIAGNOSIS — Z00.00 MEDICARE ANNUAL WELLNESS VISIT, SUBSEQUENT: Primary | ICD-10-CM

## 2019-04-05 DIAGNOSIS — E55.9 VITAMIN D DEFICIENCY: ICD-10-CM

## 2019-04-05 DIAGNOSIS — G89.29 CHRONIC HEEL PAIN, RIGHT: ICD-10-CM

## 2019-04-05 DIAGNOSIS — E11.9 TYPE 2 DIABETES MELLITUS WITHOUT COMPLICATION, WITHOUT LONG-TERM CURRENT USE OF INSULIN (HCC): ICD-10-CM

## 2019-04-05 DIAGNOSIS — I70.0 ATHEROSCLEROSIS OF AORTA (HCC): ICD-10-CM

## 2019-04-05 DIAGNOSIS — E11.69 HYPERLIPIDEMIA ASSOCIATED WITH TYPE 2 DIABETES MELLITUS (HCC): ICD-10-CM

## 2019-04-05 DIAGNOSIS — I15.2 HYPERTENSION ASSOCIATED WITH DIABETES (HCC): ICD-10-CM

## 2019-04-05 DIAGNOSIS — Z12.11 COLON CANCER SCREENING: ICD-10-CM

## 2019-04-05 DIAGNOSIS — H40.9 GLAUCOMA OF BOTH EYES, UNSPECIFIED GLAUCOMA TYPE: ICD-10-CM

## 2019-04-05 DIAGNOSIS — E78.5 HYPERLIPIDEMIA ASSOCIATED WITH TYPE 2 DIABETES MELLITUS (HCC): ICD-10-CM

## 2019-04-05 DIAGNOSIS — D51.9 ANEMIA DUE TO VITAMIN B12 DEFICIENCY, UNSPECIFIED B12 DEFICIENCY TYPE: ICD-10-CM

## 2019-04-05 DIAGNOSIS — E78.00 HYPERCHOLESTEROLEMIA: ICD-10-CM

## 2019-04-05 DIAGNOSIS — M79.671 CHRONIC HEEL PAIN, RIGHT: ICD-10-CM

## 2019-04-05 DIAGNOSIS — L98.9 SKIN LESIONS: ICD-10-CM

## 2019-04-05 DIAGNOSIS — E11.59 HYPERTENSION ASSOCIATED WITH DIABETES (HCC): ICD-10-CM

## 2019-04-05 DIAGNOSIS — J30.2 SEASONAL ALLERGIC RHINITIS, UNSPECIFIED TRIGGER: ICD-10-CM

## 2019-04-05 PROBLEM — E79.0 HYPERURICEMIA: Status: RESOLVED | Noted: 2018-06-12 | Resolved: 2019-04-05

## 2019-04-05 PROBLEM — R10.31 ABDOMINAL PAIN, RLQ (RIGHT LOWER QUADRANT): Status: RESOLVED | Noted: 2019-03-08 | Resolved: 2019-04-05

## 2019-04-05 PROBLEM — M47.816 LUMBAR ARTHROPATHY: Status: RESOLVED | Noted: 2018-06-12 | Resolved: 2019-04-05

## 2019-04-05 PROCEDURE — G0439 PPPS, SUBSEQ VISIT: HCPCS | Performed by: INTERNAL MEDICINE

## 2019-04-05 PROCEDURE — 84550 ASSAY OF BLOOD/URIC ACID: CPT

## 2019-04-05 PROCEDURE — 82570 ASSAY OF URINE CREATININE: CPT | Performed by: INTERNAL MEDICINE

## 2019-04-05 PROCEDURE — 96160 PT-FOCUSED HLTH RISK ASSMT: CPT | Performed by: INTERNAL MEDICINE

## 2019-04-05 PROCEDURE — 36415 COLL VENOUS BLD VENIPUNCTURE: CPT

## 2019-04-05 PROCEDURE — 80061 LIPID PANEL: CPT

## 2019-04-05 PROCEDURE — 83036 HEMOGLOBIN GLYCOSYLATED A1C: CPT

## 2019-04-05 PROCEDURE — 99397 PER PM REEVAL EST PAT 65+ YR: CPT | Performed by: INTERNAL MEDICINE

## 2019-04-05 PROCEDURE — 82306 VITAMIN D 25 HYDROXY: CPT

## 2019-04-05 PROCEDURE — 82043 UR ALBUMIN QUANTITATIVE: CPT | Performed by: INTERNAL MEDICINE

## 2019-04-05 PROCEDURE — 80053 COMPREHEN METABOLIC PANEL: CPT

## 2019-04-05 NOTE — PROGRESS NOTES
HPI:   Bernadine Favre is a 80year old female who presents for a Medicare Subsequent Annual Wellness visit (Pt already had Initial Annual Wellness).       Her last annual assessment has been over 1 year: Annual Physical due on 11/06/2018         Fall/Risk cancer, right ([facility]     Type 2 diabetes mellitus with hyperlipidemia (HCC)     Atherosclerosis of aorta (HCC)     Breast pain, right    Wt Readings from Last 3 Encounters:  04/05/19 : 156 lb 1.6 oz (70.8 kg)  03/07/19 : 156 lb (70.8 kg)  11/09/18 : 156 lb ([facility] (2012), Ductal carcinoma in situ of right breast (2010), Endometrial thickening on ultra sound (8/11/2014), Glaucoma, right eye, Lumbar arthropathy (6/12/2018), Osteoarthritis, Other and unspecified hyperlipidemia, Positive PPD (2005), Unspecified es history  ALL/ASTHMA: denies hx of allergy or asthma    EXAM:   /73   Pulse 77   Temp 97.8 °F (36.6 °C) (Oral)   Resp 18   Ht 5' 3\" (1.6 m)   Wt 156 lb 1.6 oz (70.8 kg)   SpO2 98%   BMI 27.65 kg/m²  Estimated body mass index is 27.65 kg/m² as calcula distress, appears stated age   Head:  Normocephalic, without obvious abnormality, atraumatic   Eyes:  PERRL, conjunctiva/corneas clear, EOM's intact both eyes   Ears:  Normal TM's and external ear canals, both ears   Nose: Nares normal, septum midline,muco 80year old female who presents for a Medicare Assessment. PLAN SUMMARY:   (Z00.00) Medicare annual wellness visit, subsequent  (primary encounter diagnosis)  Plan: URIC ACID, SERUM        Pt had her flu shot and pneumonia vaccine already.  TrunqShowe lost more than 10 pounds without trying?: 2 - No  Has your appetite been poor?: No  How does the patient maintain a good energy level?: Appropriate Exercise;Stretching  How would you describe your daily physical activity?: Moderate  How would you describe CHLAMYDIA No flowsheet data found.     Screening Mammogram      Mammogram Annually to 77, then as discussed Mammogram due on 12/13/2019 Update Health Maintenance if applicable     Immunizations (Update Immunization Activity if applicable)     Influenza  Cov Creat/alb ratio  Annually Malb/Cre Calc (no units)   Date Value   03/10/2018 27.6 (H)        LDL  Annually LDL Cholesterol (mg/dL)   Date Value   03/10/2018 56   07/18/2015 71    No flowsheet data found.      Dilated Eye exam  Annually Data entered on: 11/1

## 2019-04-07 PROBLEM — E11.69 TYPE 2 DIABETES MELLITUS WITH HYPERLIPIDEMIA (HCC): Status: RESOLVED | Noted: 2018-06-12 | Resolved: 2019-04-07

## 2019-04-07 PROBLEM — E78.5 HYPERLIPIDEMIA ASSOCIATED WITH TYPE 2 DIABETES MELLITUS (HCC): Status: ACTIVE | Noted: 2019-04-07

## 2019-04-07 PROBLEM — E78.5 TYPE 2 DIABETES MELLITUS WITH HYPERLIPIDEMIA (HCC): Status: RESOLVED | Noted: 2018-06-12 | Resolved: 2019-04-07

## 2019-04-07 PROBLEM — M79.671 CHRONIC HEEL PAIN, RIGHT: Status: ACTIVE | Noted: 2019-04-07

## 2019-04-07 PROBLEM — E11.69 HYPERLIPIDEMIA ASSOCIATED WITH TYPE 2 DIABETES MELLITUS: Status: ACTIVE | Noted: 2019-04-07

## 2019-04-07 PROBLEM — E78.5 HYPERLIPIDEMIA ASSOCIATED WITH TYPE 2 DIABETES MELLITUS  (HCC): Status: ACTIVE | Noted: 2019-04-07

## 2019-04-07 PROBLEM — E11.69 TYPE 2 DIABETES MELLITUS WITH HYPERLIPIDEMIA  (HCC): Status: RESOLVED | Noted: 2018-06-12 | Resolved: 2019-04-07

## 2019-04-07 PROBLEM — E11.69 HYPERLIPIDEMIA ASSOCIATED WITH TYPE 2 DIABETES MELLITUS (HCC): Status: ACTIVE | Noted: 2019-04-07

## 2019-04-07 PROBLEM — E78.5 TYPE 2 DIABETES MELLITUS WITH HYPERLIPIDEMIA: Status: RESOLVED | Noted: 2018-06-12 | Resolved: 2019-04-07

## 2019-04-07 PROBLEM — E11.69 HYPERLIPIDEMIA ASSOCIATED WITH TYPE 2 DIABETES MELLITUS  (HCC): Status: ACTIVE | Noted: 2019-04-07

## 2019-04-07 PROBLEM — G89.29 CHRONIC HEEL PAIN, RIGHT: Status: ACTIVE | Noted: 2019-04-07

## 2019-04-07 PROBLEM — L98.9 SKIN LESIONS: Status: ACTIVE | Noted: 2017-09-18

## 2019-04-07 PROBLEM — E78.5 HYPERLIPIDEMIA ASSOCIATED WITH TYPE 2 DIABETES MELLITUS: Status: ACTIVE | Noted: 2019-04-07

## 2019-04-07 PROBLEM — E78.5 TYPE 2 DIABETES MELLITUS WITH HYPERLIPIDEMIA  (HCC): Status: RESOLVED | Noted: 2018-06-12 | Resolved: 2019-04-07

## 2019-04-07 PROBLEM — E11.69 TYPE 2 DIABETES MELLITUS WITH HYPERLIPIDEMIA: Status: RESOLVED | Noted: 2018-06-12 | Resolved: 2019-04-07

## 2019-04-09 ENCOUNTER — TELEPHONE (OUTPATIENT)
Dept: INTERNAL MEDICINE CLINIC | Facility: CLINIC | Age: 69
End: 2019-04-09

## 2019-04-09 NOTE — TELEPHONE ENCOUNTER
Pt called in requesting to have her lab results from 4/5 mailed to her home address.   Address Verified

## 2019-04-29 ENCOUNTER — OFFICE VISIT (OUTPATIENT)
Dept: PODIATRY CLINIC | Facility: CLINIC | Age: 69
End: 2019-04-29
Payer: MEDICARE

## 2019-04-29 DIAGNOSIS — M76.61 ACHILLES TENDINITIS OF RIGHT LOWER EXTREMITY: Primary | ICD-10-CM

## 2019-04-29 PROCEDURE — 99203 OFFICE O/P NEW LOW 30 MIN: CPT | Performed by: PODIATRIST

## 2019-04-29 PROCEDURE — G0463 HOSPITAL OUTPT CLINIC VISIT: HCPCS | Performed by: PODIATRIST

## 2019-04-29 NOTE — PROGRESS NOTES
HPI:    Patient ID: Inocencio Cox is a 80year old female. 67-year-old female presents with concerns associated with the right heel. She states that off-and-on periodically present but started about a month ago. There is no history of injury.   She she states that it does not really hurt today. She states whenever she changes weather and changes shoes style then it becomes worse. She is wearing a quality supportive new balance shoe today and when she does there is no evidence of pain.   There is no

## 2019-05-01 ENCOUNTER — TELEPHONE (OUTPATIENT)
Dept: OTHER | Age: 69
End: 2019-05-01

## 2019-05-01 DIAGNOSIS — R42 VERTIGO: Primary | ICD-10-CM

## 2019-05-01 NOTE — TELEPHONE ENCOUNTER
Pt calls requesting a referral for ENT. Per pt, she has a hx of vertigo. Has been worse recently. Feels that she has pain in her left inner ear on and off for about a month.   When she has the pain, she feels like it affects her balance and has unsteady g

## 2019-05-01 NOTE — TELEPHONE ENCOUNTER
Left vm advising of requested referral in the system, number left to schedule follow up appt with ENT. Return call if any other questions.

## 2019-05-15 RX ORDER — HYDROCHLOROTHIAZIDE 12.5 MG/1
TABLET ORAL
Qty: 90 TABLET | Refills: 1 | Status: SHIPPED | OUTPATIENT
Start: 2019-05-15 | End: 2019-12-11

## 2019-05-16 NOTE — TELEPHONE ENCOUNTER
Refill passed per [facility], [facility] protocol.   Hypertensive Medications  Protocol Criteria:  · Appointment scheduled in the past 6 months or in the next 3 months  · BMP or CMP in the past 12 months  · Creatinine result < 2  Recent Outpatient Visits

## 2019-05-31 RX ORDER — LOSARTAN POTASSIUM 100 MG/1
TABLET ORAL
Qty: 90 TABLET | Refills: 1 | Status: SHIPPED | OUTPATIENT
Start: 2019-05-31 | End: 2019-11-08

## 2019-05-31 NOTE — TELEPHONE ENCOUNTER
Hypertensive Medications  Protocol Criteria:  · Appointment scheduled in the past 6 months or in the next 3 months  · BMP or CMP in the past 12 months  · Creatinine result < 2  Recent Outpatient Visits            1 month ago Achilles tendinitis of right lo

## 2019-07-08 ENCOUNTER — TELEPHONE (OUTPATIENT)
Dept: INTERNAL MEDICINE CLINIC | Facility: CLINIC | Age: 69
End: 2019-07-08

## 2019-07-08 RX ORDER — ATORVASTATIN CALCIUM 40 MG/1
TABLET, FILM COATED ORAL
Qty: 90 TABLET | Refills: 1 | Status: SHIPPED | OUTPATIENT
Start: 2019-07-08 | End: 2020-03-10

## 2019-07-08 NOTE — TELEPHONE ENCOUNTER
Pt requesting refill of statin as [place] is leaving the [place].     Cholesterol Medications  Protocol Criteria:  · Appointment scheduled in the past 12 months or in the next 3 months  · ALT & LDL on file in the past 12 months  · ALT result < 80  ·

## 2019-08-23 ENCOUNTER — TELEPHONE (OUTPATIENT)
Dept: INTERNAL MEDICINE CLINIC | Facility: CLINIC | Age: 69
End: 2019-08-23

## 2019-08-23 DIAGNOSIS — E55.9 VITAMIN D DEFICIENCY: Primary | ICD-10-CM

## 2019-08-23 DIAGNOSIS — E11.69 HYPERLIPIDEMIA ASSOCIATED WITH TYPE 2 DIABETES MELLITUS (HCC): ICD-10-CM

## 2019-08-23 DIAGNOSIS — E78.5 HYPERLIPIDEMIA ASSOCIATED WITH TYPE 2 DIABETES MELLITUS (HCC): ICD-10-CM

## 2019-08-23 DIAGNOSIS — E11.9 CONTROLLED TYPE 2 DIABETES MELLITUS WITHOUT COMPLICATION, WITHOUT LONG-TERM CURRENT USE OF INSULIN (HCC): ICD-10-CM

## 2019-08-29 ENCOUNTER — HOSPITAL ENCOUNTER (OUTPATIENT)
Dept: GENERAL RADIOLOGY | Age: 69
Discharge: HOME | End: 2019-08-29
Attending: INTERNAL MEDICINE
Payer: MEDICARE

## 2019-08-29 ENCOUNTER — OFFICE VISIT (OUTPATIENT)
Dept: INTERNAL MEDICINE CLINIC | Facility: CLINIC | Age: 69
End: 2019-08-29
Payer: MEDICARE

## 2019-08-29 VITALS
BODY MASS INDEX: 28 KG/M2 | SYSTOLIC BLOOD PRESSURE: 119 MMHG | DIASTOLIC BLOOD PRESSURE: 68 MMHG | HEART RATE: 68 BPM | HEIGHT: 63 IN | WEIGHT: 158 LBS | TEMPERATURE: 99 F

## 2019-08-29 DIAGNOSIS — H81.90 EPISODIC RECURRENT VERTIGO: ICD-10-CM

## 2019-08-29 DIAGNOSIS — I10 ESSENTIAL HYPERTENSION: Primary | ICD-10-CM

## 2019-08-29 DIAGNOSIS — M89.8X6 TIBIAL MASS: ICD-10-CM

## 2019-08-29 PROCEDURE — 99214 OFFICE O/P EST MOD 30 MIN: CPT | Performed by: INTERNAL MEDICINE

## 2019-08-29 PROCEDURE — 73590 X-RAY EXAM OF LOWER LEG: CPT | Performed by: INTERNAL MEDICINE

## 2019-08-29 RX ORDER — AMLODIPINE BESYLATE 2.5 MG/1
2.5 TABLET ORAL DAILY
Qty: 90 TABLET | Refills: 0 | Status: SHIPPED | OUTPATIENT
Start: 2019-08-29 | End: 2019-12-11

## 2019-08-29 NOTE — PROGRESS NOTES
HPI:    Patient ID: Jack Theodore is a 77year old female. Hypertension   This is a chronic problem. The current episode started more than 1 year ago. The problem has been waxing and waning since onset. The problem is controlled.  Pertinent negatives 0.005 % Ophthalmic Solution  Disp:  Rfl: 3   Calcium Carbonate-Vitamin D 600-200 MG-UNIT Oral Cap Take  by mouth. 1 po q12hrs. Disp:  Rfl:    aspirin (ASPIRIN ADULT LOW STRENGTH) 81 MG Oral Tab EC Take  by mouth.  take 1 tablet (81MG)  by ORAL route  every (primary encounter diagnosis)  Plan: bp was in good range today both arms taken by myself. However since she has episodse of elevated bp at home, I told her we will put her on norvasc 2.5mg po daily prn; she will continue her current  bp meds.      [phone] E

## 2019-08-31 ENCOUNTER — TELEPHONE (OUTPATIENT)
Dept: INTERNAL MEDICINE CLINIC | Facility: CLINIC | Age: 69
End: 2019-08-31

## 2019-08-31 DIAGNOSIS — R22.41 LEG MASS, RIGHT: Primary | ICD-10-CM

## 2019-09-06 ENCOUNTER — OFFICE VISIT (OUTPATIENT)
Dept: AUDIOLOGY | Facility: CLINIC | Age: 69
End: 2019-09-06
Payer: MEDICARE

## 2019-09-06 ENCOUNTER — OFFICE VISIT (OUTPATIENT)
Dept: OTOLARYNGOLOGY | Facility: CLINIC | Age: 69
End: 2019-09-06
Payer: MEDICARE

## 2019-09-06 VITALS
SYSTOLIC BLOOD PRESSURE: 135 MMHG | TEMPERATURE: 98 F | BODY MASS INDEX: 28 KG/M2 | HEIGHT: 63 IN | DIASTOLIC BLOOD PRESSURE: 81 MMHG | WEIGHT: 158 LBS

## 2019-09-06 DIAGNOSIS — R42 DIZZINESS: Primary | ICD-10-CM

## 2019-09-06 DIAGNOSIS — H90.3 SENSORINEURAL HEARING LOSS, BILATERAL: Primary | ICD-10-CM

## 2019-09-06 PROCEDURE — 92567 TYMPANOMETRY: CPT | Performed by: AUDIOLOGIST

## 2019-09-06 PROCEDURE — 99203 OFFICE O/P NEW LOW 30 MIN: CPT | Performed by: OTOLARYNGOLOGY

## 2019-09-06 PROCEDURE — 92557 COMPREHENSIVE HEARING TEST: CPT | Performed by: AUDIOLOGIST

## 2019-09-06 NOTE — PROGRESS NOTES
Speedy Glass is a 77year old female. Patient presents with:  Dizziness: dizziness for a while  Ear Problem: c/o ear pain    HPI:   For several years she is had problems with intermittent dizziness.   She describes her sensation as some lightheadedness hyperlipidemia    • Positive PPD 2005    S/P INH prophylaxis for 6 months    • Unspecified essential hypertension    • Vitamin B12 deficiency anemia 1/3/2008   • Vitamin D deficiency 10/31/2011      Social History:  Social History    Tobacco Use      Smoki is been experiencing intmittent dizziness for several years. History suggets bppv. I discussed positional vertigo with her today. We discussed exercises she can use when her symptoms come back.   If her problems become persistent we can consider vestibula

## 2019-09-09 ENCOUNTER — OFFICE VISIT (OUTPATIENT)
Dept: SURGERY | Facility: CLINIC | Age: 69
End: 2019-09-09
Payer: MEDICARE

## 2019-09-09 VITALS — HEIGHT: 63 IN | BODY MASS INDEX: 28 KG/M2 | WEIGHT: 158 LBS

## 2019-09-09 DIAGNOSIS — R22.41 LOCALIZED SWELLING, MASS, OR LUMP OF LOWER EXTREMITY, RIGHT: Primary | ICD-10-CM

## 2019-09-09 PROCEDURE — 99214 OFFICE O/P EST MOD 30 MIN: CPT | Performed by: SURGERY

## 2019-09-10 NOTE — PROGRESS NOTES
History and Physical      Gregoriouriel Tavarez is a 77year old female. HPI   Patient presents with:  Lump: Pt referred by Dr. Chuck Wood regarding lump onr RLL x one month. Pt denies trauma. Pt c/o intermittent pain to area.         HPI  Pt denies breast METRIX BLOOD GLUCOSE TEST) In Vitro Strip Test sugar daily Disp: 100 strip Rfl: 3   clotrimazole-betamethasone 1-0.05 % External Cream Apply bid Disp: 60 g Rfl: 3   latanoprost (XALATAN) 0.005 % Ophthalmic Solution  Disp:  Rfl: 3   Calcium Carbonate-Vitami no acute distress noted  Head/Face: normocephalic  Nose/Mouth/Throat: nose and throat are clear palate is intact mucous membranes are moist no oral lesions are noted  Neck/Thyroid: neck is supple without adenopathy  Respiratory: normal to inspection lungs

## 2019-09-10 NOTE — PROGRESS NOTES
AUDIOLOGY REPORT      Riaz Najera is a 77year old female     Referring Provider: Jose Contreras   YOB: 1950  Medical Record: [id number]      Patient Hearing History:  Patient reported dizziness.        Otoscopic Inspection:  Right ear:  N

## 2019-10-02 ENCOUNTER — NURSE TRIAGE (OUTPATIENT)
Dept: INTERNAL MEDICINE CLINIC | Facility: CLINIC | Age: 69
End: 2019-10-02

## 2019-10-02 NOTE — TELEPHONE ENCOUNTER
Action Requested: Summary for Provider     []  Critical Lab, Recommendations Needed  [] Need Additional Advice  []   FYI    []   Need Orders  [] Need Medications Sent to Pharmacy  []  Other     SUMMARY:Pt requesting appt tomorrow close to 5 PM- c/c stated

## 2019-10-03 ENCOUNTER — OFFICE VISIT (OUTPATIENT)
Dept: INTERNAL MEDICINE CLINIC | Facility: CLINIC | Age: 69
End: 2019-10-03
Payer: MEDICARE

## 2019-10-03 VITALS
WEIGHT: 155 LBS | TEMPERATURE: 98 F | DIASTOLIC BLOOD PRESSURE: 68 MMHG | SYSTOLIC BLOOD PRESSURE: 124 MMHG | BODY MASS INDEX: 27.46 KG/M2 | HEIGHT: 63 IN | HEART RATE: 80 BPM | OXYGEN SATURATION: 99 %

## 2019-10-03 DIAGNOSIS — I10 ESSENTIAL HYPERTENSION: ICD-10-CM

## 2019-10-03 DIAGNOSIS — R00.2 PALPITATION: Primary | ICD-10-CM

## 2019-10-03 PROCEDURE — 99214 OFFICE O/P EST MOD 30 MIN: CPT | Performed by: INTERNAL MEDICINE

## 2019-10-03 PROCEDURE — G0008 ADMIN INFLUENZA VIRUS VAC: HCPCS | Performed by: INTERNAL MEDICINE

## 2019-10-03 PROCEDURE — 93000 ELECTROCARDIOGRAM COMPLETE: CPT | Performed by: INTERNAL MEDICINE

## 2019-10-03 PROCEDURE — 90662 IIV NO PRSV INCREASED AG IM: CPT | Performed by: INTERNAL MEDICINE

## 2019-10-03 NOTE — PROGRESS NOTES
HPI:    Patient ID: Dain Morales is a 77year old female. Patient presents today with complaint of waking up last yesterday from her sleep with feeling of having fast heart beat and some dyspnea.  She did checked her bp and was high and recalled she TAKE 1 TABLET BY MOUTH DAILY Disp: 90 tablet Rfl: 1   Meloxicam 7.5 MG Oral Tab TAKE 1 TABLET(7.5 MG) BY MOUTH DAILY AS NEEDED FOR PAIN Disp: 90 tablet Rfl: 0   Blood Glucose Monitoring Suppl (TRUE METRIX AIR GLUCOSE METER) Does not apply Device Test sugar diagnosis)  Plan: ELECTROCARDIOGRAM, COMPLETE        We did do ekg and showed SR reate of 78/min,  Normal axis, no arrhythmia, nonspecific T  Wave changes in V1 and V2, unchanged from old ekg.   D/w pt, will have pt do 2decho, holter testing and also check

## 2019-10-25 ENCOUNTER — HOSPITAL ENCOUNTER (OUTPATIENT)
Dept: CV DIAGNOSTICS | Facility: HOSPITAL | Age: 69
Discharge: HOME | End: 2019-10-25
Attending: INTERNAL MEDICINE
Payer: MEDICARE

## 2019-10-25 DIAGNOSIS — R00.2 PALPITATION: ICD-10-CM

## 2019-10-25 PROCEDURE — 93306 TTE W/DOPPLER COMPLETE: CPT | Performed by: INTERNAL MEDICINE

## 2019-10-25 PROCEDURE — 93017 CV STRESS TEST TRACING ONLY: CPT | Performed by: INTERNAL MEDICINE

## 2019-10-25 PROCEDURE — 93227 XTRNL ECG REC<48 HR R&I: CPT | Performed by: INTERNAL MEDICINE

## 2019-10-25 PROCEDURE — 93225 XTRNL ECG REC<48 HRS REC: CPT | Performed by: INTERNAL MEDICINE

## 2019-10-25 PROCEDURE — 93018 CV STRESS TEST I&R ONLY: CPT | Performed by: INTERNAL MEDICINE

## 2019-10-25 PROCEDURE — 93016 CV STRESS TEST SUPVJ ONLY: CPT | Performed by: INTERNAL MEDICINE

## 2019-10-28 ENCOUNTER — TELEPHONE (OUTPATIENT)
Dept: INTERNAL MEDICINE CLINIC | Facility: CLINIC | Age: 69
End: 2019-10-28

## 2019-10-28 DIAGNOSIS — R94.39 ABNORMAL STRESS TEST: Primary | ICD-10-CM

## 2019-10-28 NOTE — TELEPHONE ENCOUNTER
Patient calling, Dr. Juan Ang office states did not get the referral,   Referral has been placed, called Dr. Figueroa [place] office, spoke with Umberto Burgos  Patient needs to call [phone] ( [facility] )    Called patient to inform  her to call  the [place] of

## 2019-10-28 NOTE — TELEPHONE ENCOUNTER
Patient is calling back stating there is no available appointment with Dr. Iris Turpin ([address]) until January. Patient is requesting that referral be sent to this [facility] office instead of the [address] [place] [address] office.

## 2019-11-01 ENCOUNTER — OFFICE VISIT (OUTPATIENT)
Dept: CARDIOLOGY CLINIC | Facility: CLINIC | Age: 69
End: 2019-11-01
Payer: MEDICARE

## 2019-11-01 VITALS
HEART RATE: 62 BPM | SYSTOLIC BLOOD PRESSURE: 115 MMHG | BODY MASS INDEX: 27.71 KG/M2 | HEIGHT: 63 IN | DIASTOLIC BLOOD PRESSURE: 71 MMHG | WEIGHT: 156.38 LBS

## 2019-11-01 DIAGNOSIS — R94.39 ABNORMAL STRESS TEST: Primary | ICD-10-CM

## 2019-11-01 PROCEDURE — 99204 OFFICE O/P NEW MOD 45 MIN: CPT | Performed by: NURSE PRACTITIONER

## 2019-11-01 NOTE — PROGRESS NOTES
Bernadine Favre is a 77year old female. Patient presents with:  Consult: abnormal stress test 10/25/19    HPI:   Patient comes in today for consultation. She sees Dr. Mayelin Biggs. She has a history of hypertension diabetes high cholesterol.   No significant tablet (81MG)  by ORAL route  every day, Disp: , Rfl:   Multiple Vitamin (MULTI-DAY) Oral Tab, Take  by mouth., Disp: , Rfl:   Meloxicam 7.5 MG Oral Tab, TAKE 1 TABLET(7.5 MG) BY MOUTH DAILY AS NEEDED FOR PAIN, Disp: 90 tablet, Rfl: 0       Past Medical Hi Patient is doing well from a cardiac standpoint. She had an episode of palpitations high blood pressure shortness of breath. It seems like an isolated incident she had an echocardiogram that looked fine to monitor showed no arrhythmias.   She did

## 2019-11-04 ENCOUNTER — TELEPHONE (OUTPATIENT)
Dept: CARDIOLOGY CLINIC | Facility: CLINIC | Age: 69
End: 2019-11-04

## 2019-11-04 NOTE — TELEPHONE ENCOUNTER
prior auth needed for stress echo scheduled on 11/21/19    CARD ECHO STRESS ECHO/REST AND STRESS([facility][id number] [facility] [place] [address] [[id number]]  Order #: [id number].  #:[id number] FUTURE   Priority: Routine  Class: EHV - No RFL   Resulting Agency: Rosetta RAMIREZ

## 2019-11-07 NOTE — TELEPHONE ENCOUNTER
Case in review tracking [facility] [id number][facility] Uploaded echo, stress and LOV.

## 2019-11-07 NOTE — TELEPHONE ENCOUNTER
They are unable to approve PA at nurse level - due to the fact that pt just had an echo done with another Dr on 10/25/19 - do you want to withdraw or sent it for next level review

## 2019-11-08 RX ORDER — LOSARTAN POTASSIUM 100 MG/1
TABLET ORAL
Qty: 90 TABLET | Refills: 1 | Status: SHIPPED | OUTPATIENT
Start: 2019-11-08 | End: 2020-05-11

## 2019-11-08 NOTE — TELEPHONE ENCOUNTER
Called health help and reviewed that stress echo is needed because of abnormal stress test. They will forward case on.

## 2019-11-09 NOTE — TELEPHONE ENCOUNTER
Refill passed per [facility] protocol.   Hypertensive Medications  Protocol Criteria:  · Appointment scheduled in the past 6 months or in the next 3 months  · BMP or CMP in the past 12 months  · Creatinine result < 2  Recent Outpatient Visits

## 2019-11-11 ENCOUNTER — TELEPHONE (OUTPATIENT)
Dept: CARDIOLOGY CLINIC | Facility: CLINIC | Age: 69
End: 2019-11-11

## 2019-11-11 NOTE — TELEPHONE ENCOUNTER
[facility] Scheduling calling for patient to ask for patient if cardio echo stress is on the treadmill or is their any iv involved. Patient is scheduled 11/21/19, please call patient directly at:[phone],thanks.

## 2019-11-12 NOTE — TELEPHONE ENCOUNTER
annabella Confirmation Number for Exam Scheduling  Attention: Procedure Coordinator for [facility]  Confirmation Date: Nov 21 2019 - Dec 21 2019  Member ID Number:  Patient Name:  Patient Phone Number:  Patient date of Birth: [id number]  [address] [place] [address]

## 2019-11-12 NOTE — TELEPHONE ENCOUNTER
Verified HIPAA and LM to answer her question about the stress echo. Yes on treadmill and possible IV needed.

## 2019-11-18 ENCOUNTER — NURSE TRIAGE (OUTPATIENT)
Dept: INTERNAL MEDICINE CLINIC | Facility: CLINIC | Age: 69
End: 2019-11-18

## 2019-11-18 NOTE — TELEPHONE ENCOUNTER
Action Requested: Summary for Provider     []  Critical Lab, Recommendations Needed  [] Need Additional Advice  []   FYI    []   Need Orders  [] Need Medications Sent to Pharmacy  []  Other     SUMMARY: Pt report cough x 1 week.  Denies SOB, CP or cold symp

## 2019-12-11 RX ORDER — AMLODIPINE BESYLATE 2.5 MG/1
TABLET ORAL
Qty: 90 TABLET | Refills: 1 | Status: SHIPPED | OUTPATIENT
Start: 2019-12-11 | End: 2020-11-22

## 2019-12-11 RX ORDER — HYDROCHLOROTHIAZIDE 12.5 MG/1
TABLET ORAL
Qty: 90 TABLET | Refills: 1 | Status: SHIPPED | OUTPATIENT
Start: 2019-12-11 | End: 2020-11-22

## 2019-12-11 NOTE — TELEPHONE ENCOUNTER
Refill passed per [facility] protocol.     Hypertensive Medications  Protocol Criteria:  · Appointment scheduled in the past 6 months or in the next 3 months  · BMP or CMP in the past 12 months  · Creatinine result < 2  Recent Outpatient Visits

## 2019-12-17 ENCOUNTER — HOSPITAL ENCOUNTER (OUTPATIENT)
Dept: CV DIAGNOSTICS | Facility: HOSPITAL | Age: 69
Discharge: HOME | End: 2019-12-17
Attending: NURSE PRACTITIONER
Payer: MEDICARE

## 2019-12-17 DIAGNOSIS — R94.39 ABNORMAL STRESS TEST: ICD-10-CM

## 2019-12-17 PROCEDURE — 93018 CV STRESS TEST I&R ONLY: CPT | Performed by: NURSE PRACTITIONER

## 2019-12-17 PROCEDURE — 93016 CV STRESS TEST SUPVJ ONLY: CPT | Performed by: NURSE PRACTITIONER

## 2019-12-17 PROCEDURE — 93350 STRESS TTE ONLY: CPT | Performed by: NURSE PRACTITIONER

## 2019-12-17 PROCEDURE — 93017 CV STRESS TEST TRACING ONLY: CPT | Performed by: NURSE PRACTITIONER

## 2020-02-26 ENCOUNTER — TELEPHONE (OUTPATIENT)
Dept: INTERNAL MEDICINE CLINIC | Facility: CLINIC | Age: 70
End: 2020-02-26

## 2020-02-26 DIAGNOSIS — D05.91: Primary | ICD-10-CM

## 2020-02-27 NOTE — TELEPHONE ENCOUNTER
LM on VM, HIPAA verified, mammogram has been ordered, [id number] to schedule [place]  Progress Note and Procedure Note      Edgar Apodaca  AGE: 65 y.o. GENDER: female  : 1955  TODAY'S DATE:  8/3/2018    Subjective:     Chief Complaint   Patient presents with    Wound Check     left heel         HISTORY of PRESENT ILLNESS HPI     Edgar Apodaca is a 65 y.o. female who presents today for wound evaluation. History of Wound: Patient is brought here today by a caregiver. They have been using the offloading boot as directed and have recently purchased a new one. Bandage changes daily. There been no issues. The patient is not in distress. Wound Pain:  none  Severity:  0 / 10   Wound Type:  pressure and traumatic  Modifying Factors:  venous stasis, lymphedema and decreased mobility  Associated Signs/Symptoms:  edema        PAST MEDICAL HISTORY        Diagnosis Date    Cerebral palsy (HCC)     Constipation     Dysphagia     mechanical soft diet, nectar thick liquids    Fracture     left leg brace per sister 2014    Intractable epilepsy ([facility]     Mental retardation     Osteoporosis     Recurrent UTI     Seizure disorder ([facility]     seizures well controlled( last seizure )    Wheelchair bound        ALLERGIES    Allergies   Allergen Reactions    Macrobid [Nitrofurantoin Monohydrate Macrocrystals]     Neosporin [Neomycin-Polymyxin-Gramicidin]     Nitrofurantoin     Nitrofurantoin Macrocrystal     Penicillins     Sulfa Antibiotics        MEDICATIONS    Current Outpatient Prescriptions on File Prior to Encounter   Medication Sig Dispense Refill    gentamicin (GARAMYCIN) 0.1 % cream Apply topically daily. 1 Tube 1    bacitracin 500 UNIT/GM ointment Apply topically 2 times daily Apply topically 2 times daily.  ferrous sulfate 325 (65 FE) MG tablet Take 325 mg by mouth daily (with breakfast)      acyclovir (ZOVIRAX) 5 % ointment Apply topically every 3 hours Apply topically every 3 hours.       dicyclomine (BENTYL) 20 MG tablet Take 20 mg by mouth 2 times daily.  omeprazole (PRILOSEC) 40 MG capsule Take 40 mg by mouth daily.  calcium carbonate (OSCAL) 500 MG TABS tablet Take 500 mg by mouth 2 times daily.  Potassium Chloride ER 20 MEQ TBCR Take 20 mEq by mouth daily.  guaiFENesin (MUCINEX) 600 MG SR tablet Take 1,200 mg by mouth every 12 hours as needed for Congestion.  citalopram (CELEXA) 10 MG tablet Take 20 mg by mouth daily       ibuprofen (IBU) 600 MG tablet Take 1 tablet by mouth every 8 hours as needed for Pain. 20 tablet 0    acetaminophen (TYLENOL) 325 MG tablet Take 650 mg by mouth every 6 hours as needed.  divalproex (DEPAKOTE SPRINKLE) 125 MG capsule Take 2 capsules by mouth 2 times daily. 60 capsule 1    NYSTATIN IN AQUAPHOR OINTMENT Apply  topically 2 times daily as needed.  Dextromethorphan-Guaifenesin (ROBITUSSIN DM PO) Take 10 mLs by mouth every 4 hours as needed.  diazepam (DIASTAT ACUDIAL) 10 MG GEL Place  rectally once as needed. UP TO 15 MG RECTALLY PRN, any seizure greater than 5 min       chlorhexidine (PERIDEX) 0.12 % solution Take 15 mLs by mouth 2 times daily.  docusate sodium (COLACE) 100 MG capsule Take 100 mg by mouth 2 times daily.  furosemide (LASIX) 40 MG tablet Take 40 mg by mouth daily.  polyethylene glycol (GLYCOLAX) powder Take 17 g by mouth 2 times daily. MIX 1 TABLESPOON IN JUICE       No current facility-administered medications on file prior to encounter. REVIEW OF SYSTEMS    Pertinent items are noted in HPI. Objective:        BP 96/64   Pulse 94   Temp 98.6 °F (37 °C) (Oral)   Resp 18     PHYSICAL EXAM    Vascular: Vascular status Intact  palpable pedal pulses, right DP1/4 and PT1/4, left DP1/4 and PT1/4. CFT 3 seconds digits 1 to 5 bilateral.  Hair growthAbsent  both lower extremities and feet.   Skin temperature is warm to warm from pretibial area to distal digits bilateral.  Exam is negative for rubor, pallor, cyanosis or signs of acute vascular compromise bilaterally. Exam is positive for edema bilateral lower extremity. Varicosities Absent  bilateral lower extremity. Neuro: Neurologic status Difficult to assess due to patient's mental status. Patient does respond to touch. There were no reproducible neuritic symptoms on exam bilateral feet/ankles. Derm: Ulceration to left Foot. Ecchymosis Absent  bilateral feet/foot. Musculoskeletal: no discomfort with debridement of wound 5/5 muscle strength in/eversion and dorsi/plantarflexion bilateral feet. No gross instability noted. Procedure Note    Performed by: Yulissa Gonzalez DPM    Consent obtained: Yes    Time out taken:  Yes    Pain Control: Anesthetic  Anesthetic: 4% Topical Xylocaine     Debridement:Excisional Debridement    Using curette the wound was sharply debrided    down through and including the removal of epidermis, dermis and subcutaneous tissue. Devitalized Tissue Debrided:  fibrin, slough, necrotic/eschar and exudate    Pre Debridement Measurements:  Are located in the Wound Documentation Flow Sheet    Wound #: 3     Wound Care Documentation:  Wound 01/31/18 Heel Left #3 (Active)   Wound Image   6/26/2018 10:10 AM   Wound Type Wound 8/3/2018  8:17 AM   Wound Pressure Stage  3 8/3/2018  8:17 AM   Dressing Changed Changed/New 5/23/2018 11:27 AM   Dressing/Treatment Dry dressing; Foam 7/19/2018 10:39 AM   Wound Cleansed Rinsed/Irrigated with saline 8/3/2018  8:31 AM   Wound Length (cm) 1.7 cm 8/3/2018  8:31 AM   Wound Width (cm) 1.6 cm 8/3/2018  8:31 AM   Wound Depth (cm)  0.1 8/3/2018  8:31 AM   Calculated Wound Size (cm^2) (l*w) 2.72 cm^2 8/3/2018  8:31 AM   Change in Wound Size % (l*w) -223.81 8/3/2018  8:31 AM   Wound Assessment Bleeding 8/3/2018  8:31 AM   Drainage Amount Moderate 8/3/2018  8:17 AM   Drainage Description Mukherjee;Serous 8/3/2018  8:17 AM   Odor None 8/3/2018  8:17 AM   Sharla-wound Assessment White 8/3/2018  8:17 AM

## 2020-03-10 RX ORDER — ATORVASTATIN CALCIUM 40 MG/1
TABLET, FILM COATED ORAL
Qty: 90 TABLET | Refills: 1 | Status: SHIPPED | OUTPATIENT
Start: 2020-03-10 | End: 2020-10-10

## 2020-03-10 NOTE — TELEPHONE ENCOUNTER
Cholesterol Medications  Refill passed per [facility] protocol.     Protocol Criteria:  · Appointment scheduled in the past 12 months or in the next 3 months  · ALT & LDL on file in the past 12 months  · ALT result < 80  · LDL result <130   Recent Outp

## 2020-05-06 ENCOUNTER — TELEPHONE (OUTPATIENT)
Dept: INTERNAL MEDICINE CLINIC | Facility: CLINIC | Age: 70
End: 2020-05-06

## 2020-05-07 ENCOUNTER — OFFICE VISIT (OUTPATIENT)
Dept: INTERNAL MEDICINE CLINIC | Facility: CLINIC | Age: 70
End: 2020-05-07
Payer: MEDICARE

## 2020-05-07 ENCOUNTER — LAB ENCOUNTER (OUTPATIENT)
Dept: LAB | Age: 70
End: 2020-05-07
Attending: INTERNAL MEDICINE
Payer: MEDICARE

## 2020-05-07 VITALS
WEIGHT: 148 LBS | HEIGHT: 63 IN | HEART RATE: 87 BPM | DIASTOLIC BLOOD PRESSURE: 78 MMHG | TEMPERATURE: 99 F | SYSTOLIC BLOOD PRESSURE: 126 MMHG | BODY MASS INDEX: 26.22 KG/M2

## 2020-05-07 DIAGNOSIS — E78.5 HYPERLIPIDEMIA ASSOCIATED WITH TYPE 2 DIABETES MELLITUS (HCC): ICD-10-CM

## 2020-05-07 DIAGNOSIS — E11.9 TYPE 2 DIABETES MELLITUS WITHOUT COMPLICATION, WITHOUT LONG-TERM CURRENT USE OF INSULIN (HCC): ICD-10-CM

## 2020-05-07 DIAGNOSIS — E11.59 HYPERTENSION ASSOCIATED WITH TYPE 2 DIABETES MELLITUS (HCC): ICD-10-CM

## 2020-05-07 DIAGNOSIS — J30.2 SEASONAL ALLERGIC RHINITIS, UNSPECIFIED TRIGGER: ICD-10-CM

## 2020-05-07 DIAGNOSIS — E11.9 CONTROLLED TYPE 2 DIABETES MELLITUS WITHOUT COMPLICATION, WITHOUT LONG-TERM CURRENT USE OF INSULIN (HCC): ICD-10-CM

## 2020-05-07 DIAGNOSIS — E53.8 VITAMIN B12 DEFICIENCY: ICD-10-CM

## 2020-05-07 DIAGNOSIS — I15.2 HYPERTENSION ASSOCIATED WITH TYPE 2 DIABETES MELLITUS (HCC): ICD-10-CM

## 2020-05-07 DIAGNOSIS — Z92.89 HISTORY OF POSITIVE PPD: ICD-10-CM

## 2020-05-07 DIAGNOSIS — E55.9 VITAMIN D DEFICIENCY: ICD-10-CM

## 2020-05-07 DIAGNOSIS — E11.69 HYPERLIPIDEMIA ASSOCIATED WITH TYPE 2 DIABETES MELLITUS (HCC): ICD-10-CM

## 2020-05-07 DIAGNOSIS — C50.911 HORMONE RECEPTOR POSITIVE BREAST CANCER, RIGHT (HCC): ICD-10-CM

## 2020-05-07 DIAGNOSIS — Z00.00 MEDICARE ANNUAL WELLNESS VISIT, SUBSEQUENT: ICD-10-CM

## 2020-05-07 DIAGNOSIS — Z12.31 ENCOUNTER FOR SCREENING MAMMOGRAM FOR BREAST CANCER: ICD-10-CM

## 2020-05-07 DIAGNOSIS — I70.0 ATHEROSCLEROSIS OF AORTA (HCC): ICD-10-CM

## 2020-05-07 DIAGNOSIS — Z12.11 COLON CANCER SCREENING: ICD-10-CM

## 2020-05-07 DIAGNOSIS — M85.80 OSTEOPENIA, UNSPECIFIED LOCATION: ICD-10-CM

## 2020-05-07 DIAGNOSIS — Z00.00 MEDICARE ANNUAL WELLNESS VISIT, SUBSEQUENT: Primary | ICD-10-CM

## 2020-05-07 PROCEDURE — 83036 HEMOGLOBIN GLYCOSYLATED A1C: CPT

## 2020-05-07 PROCEDURE — 80053 COMPREHEN METABOLIC PANEL: CPT

## 2020-05-07 PROCEDURE — 99397 PER PM REEVAL EST PAT 65+ YR: CPT | Performed by: INTERNAL MEDICINE

## 2020-05-07 PROCEDURE — 82607 VITAMIN B-12: CPT | Performed by: INTERNAL MEDICINE

## 2020-05-07 PROCEDURE — 82043 UR ALBUMIN QUANTITATIVE: CPT

## 2020-05-07 PROCEDURE — G0439 PPPS, SUBSEQ VISIT: HCPCS | Performed by: INTERNAL MEDICINE

## 2020-05-07 PROCEDURE — 36415 COLL VENOUS BLD VENIPUNCTURE: CPT

## 2020-05-07 PROCEDURE — 80061 LIPID PANEL: CPT

## 2020-05-07 PROCEDURE — 82306 VITAMIN D 25 HYDROXY: CPT

## 2020-05-07 PROCEDURE — 82570 ASSAY OF URINE CREATININE: CPT

## 2020-05-07 PROCEDURE — 96160 PT-FOCUSED HLTH RISK ASSMT: CPT | Performed by: INTERNAL MEDICINE

## 2020-05-07 PROCEDURE — 85025 COMPLETE CBC W/AUTO DIFF WBC: CPT

## 2020-05-07 RX ORDER — CALCIUM CITRATE/VITAMIN D3 200MG-6.25
TABLET ORAL
Qty: 100 STRIP | Refills: 3 | Status: SHIPPED | OUTPATIENT
Start: 2020-05-07 | End: 2021-03-10

## 2020-05-07 NOTE — PROGRESS NOTES
HPI:   Delores Pena is a 77year old female who presents for a Medicare Subsequent Annual Wellness visit (Pt already had Initial Annual Wellness).       Her last annual assessment has been over 1 year: Annual Physical due on 04/05/2020         Fall/Risk receptor positive breast cancer, right ([facility]     Atherosclerosis of aorta ([facility]     Hyperlipidemia associated with type 2 diabetes mellitus (HCC)     Chronic heel pain, right     Palpitation     Vitamin B12 deficiency     History of positive PPD    Wt Read (3/15/2011), Cataracts, bilateral, Diabetes ([facility] Diabetes mellitus, type II ([facility] (2012), Ductal carcinoma in situ of right breast (2010), Endometrial thickening on ultra sound (8/11/2014), Glaucoma, right eye, Lumbar arthropathy (6/12/2018), [place] Hollins depression or anxiety  HEMATOLOGIC: denies hx of anemia  ENDOCRINE: denies thyroid history  ALL/ASTHMA: denies hx of allergy or asthma    EXAM:   /78 (BP Location: Right arm, Patient Position: Sitting, Cuff Size: large)   Pulse 87   Temp 98.7 °F (37. Eyes:  PERRL, conjunctiva/corneas clear, EOM's intact both eyes   Ears:  Normal TM's and external ear canals, both ears   Nose: Nares normal, septum midline,mucosa normal, no drainage or sinus tenderness   Throat: Lips, mucosa, and tongue normal; teeth a SUMMARY:   (Z00.00) Medicare annual wellness visit, subsequent  (primary encounter diagnosis)  Plan: CBC WITH DIFFERENTIAL WITH PLATELET        Check routine labs. Pt already had her pneumonia shots. Advised to get shingrix and Tdap.  Fit testing for colon Health     In the past six months, have you lost more than 10 pounds without trying?: 2 - No  Has your appetite been poor?: No  How does the patient maintain a good energy level?: Appropriate Exercise;Daily Walks  How would you describe your daily physical if high risk No results found for: CHLAMYDIA No flowsheet data found.     Screening Mammogram      Mammogram Annually to 77, then as discussed Mammogram due on 12/13/2019 Update Health Maintenance if applicable     Immunizations (Update Immunization Activit Date Value   05/07/2020 33.5 (H)        LDL  Annually LDL Cholesterol (mg/dL)   Date Value   05/07/2020 33    No flowsheet data found. Dilated Eye exam  Annually Data entered on: 5/7/2020   Last Dilated Eye Exam 12/4/2019     No flowsheet data found.

## 2020-05-09 PROBLEM — N64.4 BREAST PAIN, RIGHT: Status: RESOLVED | Noted: 2018-11-02 | Resolved: 2020-05-09

## 2020-05-09 PROBLEM — Z92.89 HISTORY OF POSITIVE PPD: Status: ACTIVE | Noted: 2020-05-09

## 2020-05-09 PROBLEM — E53.8 VITAMIN B12 DEFICIENCY: Status: ACTIVE | Noted: 2020-05-09

## 2020-05-09 PROBLEM — M89.8X6 TIBIAL MASS: Status: RESOLVED | Noted: 2019-08-29 | Resolved: 2020-05-09

## 2020-05-09 PROBLEM — Z12.11 COLON CANCER SCREENING: Status: RESOLVED | Noted: 2018-11-02 | Resolved: 2020-05-09

## 2020-05-09 PROBLEM — L98.9 SKIN LESIONS: Status: RESOLVED | Noted: 2017-09-18 | Resolved: 2020-05-09

## 2020-05-09 PROBLEM — H81.90 EPISODIC RECURRENT VERTIGO: Status: RESOLVED | Noted: 2019-08-29 | Resolved: 2020-05-09

## 2020-05-11 RX ORDER — LOSARTAN POTASSIUM 100 MG/1
TABLET ORAL
Qty: 90 TABLET | Refills: 1 | Status: SHIPPED | OUTPATIENT
Start: 2020-05-11 | End: 2020-11-22

## 2020-05-22 ENCOUNTER — TELEPHONE (OUTPATIENT)
Dept: INTERNAL MEDICINE CLINIC | Facility: CLINIC | Age: 70
End: 2020-05-22

## 2020-05-22 NOTE — TELEPHONE ENCOUNTER
Patient called to follow up on lab results. Advised patient of two lab results from provider. She verbalized understanding.     Patient requested lab results be mailed to her    Address verified, lab results printed, addressed and placed in outgoing

## 2020-05-27 ENCOUNTER — TELEPHONE (OUTPATIENT)
Dept: INTERNAL MEDICINE CLINIC | Facility: CLINIC | Age: 70
End: 2020-05-27

## 2020-05-27 NOTE — TELEPHONE ENCOUNTER
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

## 2020-05-27 NOTE — TELEPHONE ENCOUNTER
Informed pt. She states that her company will test employees with [facility] but they will need a doctor's order or NPI number to proceed.  I told her entering an order may cause it to be declined by our infection control team. I did provide her with Dr. Sue Jones

## 2020-05-27 NOTE — TELEPHONE ENCOUNTER
cdc guidelines states testing for covid 19 for symptomatic patients. [address] [place] [address] follows this guideline strictly. There are only 2 places in [facility] system test could be done either in ER or drive thru in [place].  Even if I order the covid 19 test, [facility] still will

## 2020-07-20 ENCOUNTER — HOSPITAL ENCOUNTER (OUTPATIENT)
Dept: MAMMOGRAPHY | Age: 70
Discharge: HOME | End: 2020-07-20
Attending: INTERNAL MEDICINE
Payer: MEDICARE

## 2020-07-20 DIAGNOSIS — Z12.31 ENCOUNTER FOR SCREENING MAMMOGRAM FOR BREAST CANCER: ICD-10-CM

## 2020-07-20 PROCEDURE — 77063 BREAST TOMOSYNTHESIS BI: CPT | Performed by: INTERNAL MEDICINE

## 2020-07-20 PROCEDURE — 77067 SCR MAMMO BI INCL CAD: CPT | Performed by: INTERNAL MEDICINE

## 2020-07-29 ENCOUNTER — TELEPHONE (OUTPATIENT)
Dept: INTERNAL MEDICINE CLINIC | Facility: CLINIC | Age: 70
End: 2020-07-29

## 2020-07-29 NOTE — TELEPHONE ENCOUNTER
Patient returned our call, Patient informed of results ( identified name and ) and recommendations, as per provider's result note. Patient voiced understanding and agrees.           Patient Result Comments     Written by Flavio Barton MD on /

## 2020-07-29 NOTE — TELEPHONE ENCOUNTER
Patient requesting a copy of her mammogram results mailed to her and would also like nurse to call her back to discuss her results, patient is concerned. Please advise.

## 2020-10-11 RX ORDER — ATORVASTATIN CALCIUM 40 MG/1
40 TABLET, FILM COATED ORAL NIGHTLY
Qty: 90 TABLET | Refills: 1 | Status: SHIPPED | OUTPATIENT
Start: 2020-10-11 | End: 2021-04-25

## 2020-11-22 RX ORDER — HYDROCHLOROTHIAZIDE 12.5 MG/1
TABLET ORAL
Qty: 90 TABLET | Refills: 1 | Status: SHIPPED | OUTPATIENT
Start: 2020-11-22

## 2020-11-22 RX ORDER — LOSARTAN POTASSIUM 100 MG/1
TABLET ORAL
Qty: 90 TABLET | Refills: 1 | Status: SHIPPED | OUTPATIENT
Start: 2020-11-22 | End: 2021-05-22

## 2020-11-22 RX ORDER — AMLODIPINE BESYLATE 2.5 MG/1
TABLET ORAL
Qty: 90 TABLET | Refills: 1 | Status: SHIPPED | OUTPATIENT
Start: 2020-11-22 | End: 2021-05-16

## 2020-12-11 ENCOUNTER — TELEPHONE (OUTPATIENT)
Dept: INTERNAL MEDICINE CLINIC | Facility: CLINIC | Age: 70
End: 2020-12-11

## 2020-12-11 DIAGNOSIS — H40.9 GLAUCOMA, UNSPECIFIED GLAUCOMA TYPE, UNSPECIFIED LATERALITY: Primary | ICD-10-CM

## 2020-12-11 NOTE — TELEPHONE ENCOUNTER
Verified name and . Patient requesting a referral to ophthalmologist, Dr. Vita Bo, for regular glaucoma check up. Please advise and thank you.

## 2020-12-11 NOTE — TELEPHONE ENCOUNTER
Patient was informed of referral. Requested referral be faxed to Dr. Natalia Germain office. Referral faxed.

## 2021-02-01 DIAGNOSIS — Z23 NEED FOR VACCINATION: ICD-10-CM

## 2021-02-03 ENCOUNTER — OFFICE VISIT (OUTPATIENT)
Dept: INTERNAL MEDICINE CLINIC | Facility: CLINIC | Age: 71
End: 2021-02-03
Payer: MEDICARE

## 2021-02-03 VITALS
BODY MASS INDEX: 27.36 KG/M2 | HEART RATE: 85 BPM | TEMPERATURE: 98 F | SYSTOLIC BLOOD PRESSURE: 134 MMHG | HEIGHT: 63 IN | WEIGHT: 154.38 LBS | DIASTOLIC BLOOD PRESSURE: 67 MMHG

## 2021-02-03 DIAGNOSIS — M54.50 ACUTE BILATERAL LOW BACK PAIN WITHOUT SCIATICA: Primary | ICD-10-CM

## 2021-02-03 DIAGNOSIS — E53.8 VITAMIN B12 DEFICIENCY: ICD-10-CM

## 2021-02-03 DIAGNOSIS — E78.5 HYPERLIPIDEMIA ASSOCIATED WITH TYPE 2 DIABETES MELLITUS (HCC): ICD-10-CM

## 2021-02-03 DIAGNOSIS — Z12.11 COLON CANCER SCREENING: ICD-10-CM

## 2021-02-03 DIAGNOSIS — E11.59 HYPERTENSION ASSOCIATED WITH TYPE 2 DIABETES MELLITUS (HCC): ICD-10-CM

## 2021-02-03 DIAGNOSIS — E11.9 TYPE 2 DIABETES MELLITUS WITHOUT COMPLICATION, WITHOUT LONG-TERM CURRENT USE OF INSULIN (HCC): ICD-10-CM

## 2021-02-03 DIAGNOSIS — E11.69 HYPERLIPIDEMIA ASSOCIATED WITH TYPE 2 DIABETES MELLITUS (HCC): ICD-10-CM

## 2021-02-03 DIAGNOSIS — I15.2 HYPERTENSION ASSOCIATED WITH TYPE 2 DIABETES MELLITUS (HCC): ICD-10-CM

## 2021-02-03 PROCEDURE — 99214 OFFICE O/P EST MOD 30 MIN: CPT | Performed by: INTERNAL MEDICINE

## 2021-02-03 PROCEDURE — 3008F BODY MASS INDEX DOCD: CPT | Performed by: INTERNAL MEDICINE

## 2021-02-03 PROCEDURE — 3075F SYST BP GE 130 - 139MM HG: CPT | Performed by: INTERNAL MEDICINE

## 2021-02-03 PROCEDURE — 3078F DIAST BP <80 MM HG: CPT | Performed by: INTERNAL MEDICINE

## 2021-02-03 NOTE — PROGRESS NOTES
HPI:    Patient ID: Zackary Leon is a 72year old female. Low Back Pain  This is a new problem. 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  The problem occurs year ago. The problem has been gradually improving since onset. The problem is controlled. Pertinent negatives include no chest pain, peripheral edema or shortness of breath. There are no associated agents to hypertension.  Risk factors for coronary artery BESYLATE 2.5 MG Oral Tab TAKE 1 TABLET(2.5 MG) BY MOUTH DAILY 90 tablet 1   • atorvastatin 40 MG Oral Tab Take 1 tablet (40 mg total) by mouth nightly.  90 tablet 1   • Glucose Blood (TRUE METRIX BLOOD GLUCOSE TEST) In Vitro Strip Test sugar daily 100 strip of Onset   • Stroke Father 80   • Hypertension Father 80        myocardial infarction   • Heart Disease Father 80        CAD   • Heart Disorder Father    • Heart Disease Mother 68        CAD   • Hypertension Mother         mycardial infarction   • Musculo- has normal strength. No sensory deficit. Reflex Scores:       Patellar reflexes are 2+ on the right side and 2+ on the left side. Achilles reflexes are 2+ on the right side and 2+ on the left side.   Ankle clonus negative; SLR negative   Skin: Skin encounter.       Meds This Visit:  Requested Prescriptions      No prescriptions requested or ordered in this encounter       Imaging & Referrals:  None        IV#[id number]

## 2021-02-19 ENCOUNTER — TELEPHONE (OUTPATIENT)
Dept: INTERNAL MEDICINE CLINIC | Facility: CLINIC | Age: 71
End: 2021-02-19

## 2021-02-19 NOTE — TELEPHONE ENCOUNTER
Patient is calling in regards to COVID-19 vaccine. She is asking when she can get the COVID-19 vaccine.  She was informed that an active order has been placed for her, however, that we do not currently have a supply of the first dose of the COVID-19 vaccine

## 2021-02-20 ENCOUNTER — NURSE TRIAGE (OUTPATIENT)
Dept: INTERNAL MEDICINE CLINIC | Facility: CLINIC | Age: 71
End: 2021-02-20

## 2021-02-20 NOTE — TELEPHONE ENCOUNTER
Action Requested: Summary for Provider     []  Critical Lab, Recommendations Needed  [] Need Additional Advice  []   FYI    []   Need Orders  [] Need Medications Sent to Pharmacy  []  Other     SUMMARY: home care advised. Call back if symptoms progress.  Wi

## 2021-03-05 ENCOUNTER — TELEPHONE (OUTPATIENT)
Dept: INTERNAL MEDICINE CLINIC | Facility: CLINIC | Age: 71
End: 2021-03-05

## 2021-03-10 ENCOUNTER — TELEPHONE (OUTPATIENT)
Dept: INTERNAL MEDICINE CLINIC | Facility: CLINIC | Age: 71
End: 2021-03-10

## 2021-03-10 RX ORDER — LANCETS
1 EACH MISCELLANEOUS DAILY
Qty: 100 EACH | Refills: 1 | Status: SHIPPED | OUTPATIENT
Start: 2021-03-10

## 2021-03-10 RX ORDER — BLOOD-GLUCOSE METER
1 EACH MISCELLANEOUS ONCE
Qty: 1 KIT | Refills: 0 | Status: SHIPPED | OUTPATIENT
Start: 2021-03-10 | End: 2021-03-10

## 2021-03-10 RX ORDER — BLOOD SUGAR DIAGNOSTIC
1 STRIP MISCELLANEOUS DAILY
Qty: 100 STRIP | Refills: 1 | Status: SHIPPED | OUTPATIENT
Start: 2021-03-10

## 2021-03-11 NOTE — TELEPHONE ENCOUNTER
Pharmacy states they received refills for patient's diabetic supplies, however they did not get 2 others they had requested. Please send prescription for    Alcohol swabs and solution.

## 2021-03-11 NOTE — TELEPHONE ENCOUNTER
Spoke to [facility] at [facility]. Patient needs order for alcohol swabs and control test solution for glucose monitor. Sp[place] pharmacist Cynthia Martell [place] [facility] verbal order given for alcohol swabs and control test solution.

## 2021-03-17 RX ORDER — BLOOD-GLUCOSE METER
EACH MISCELLANEOUS
Qty: 1 KIT | Refills: 0 | Status: SHIPPED | OUTPATIENT
Start: 2021-03-17

## 2021-03-17 NOTE — TELEPHONE ENCOUNTER
Rx: Accu-Chek Christi Plus Meter    Rx: Accu-Chek Christi Plus Test Strp    Rx: Accu-Chek Softclix Lancets

## 2021-03-18 ENCOUNTER — TELEPHONE (OUTPATIENT)
Dept: INTERNAL MEDICINE CLINIC | Facility: CLINIC | Age: 71
End: 2021-03-18

## 2021-03-18 NOTE — TELEPHONE ENCOUNTER
Current Outpatient Medications:   •  Blood Glucose Monitoring Suppl (ACCU-CHEK GLADIS PLUS) w/Device Does not apply Kit, Use to check glucose daily, Disp: 1 kit, Rfl: 0    Message:  Product not available. May pt start using another brand? Please advice.

## 2021-03-19 RX ORDER — DIPHENHYDRAMINE HCL 25 MG
1 TABLET ORAL AS DIRECTED
Qty: 1 KIT | Refills: 0 | Status: SHIPPED | OUTPATIENT
Start: 2021-03-19

## 2021-03-19 RX ORDER — CALCIUM CITRATE/VITAMIN D3 200MG-6.25
1 TABLET ORAL DAILY
Qty: 100 STRIP | Refills: 0 | Status: SHIPPED | OUTPATIENT
Start: 2021-03-19

## 2021-04-25 RX ORDER — ATORVASTATIN CALCIUM 40 MG/1
TABLET, FILM COATED ORAL
Qty: 90 TABLET | Refills: 0 | Status: SHIPPED | OUTPATIENT
Start: 2021-04-25 | End: 2021-07-25

## 2021-05-16 RX ORDER — AMLODIPINE BESYLATE 2.5 MG/1
TABLET ORAL
Qty: 90 TABLET | Refills: 1 | Status: SHIPPED | OUTPATIENT
Start: 2021-05-16 | End: 2021-10-22

## 2021-05-17 ENCOUNTER — TELEPHONE (OUTPATIENT)
Dept: INTERNAL MEDICINE CLINIC | Facility: CLINIC | Age: 71
End: 2021-05-17

## 2021-05-17 NOTE — TELEPHONE ENCOUNTER
Pt already seeing gastro Dr Jordan Kaminski and from the notes, she will be having colonoscopy which is the test she needs to do for her change in her bowel movement.

## 2021-05-17 NOTE — TELEPHONE ENCOUNTER
Pt states has PX 5/20/21. Per pt \"I have a change in my bowel habits. I have very soft stool for the last month at least 3 times a day\"    Pt denies black tarry stools, felicia blood in stool, abd pain, fever, nausea/vomiting.     Pt asking if she shoul

## 2021-05-17 NOTE — TELEPHONE ENCOUNTER
Informed patient of advice per Dr. Codey Corrales, but patient worried she may have bacteria in her bowel. Will ask further questions of Dr. Codey Corrales on 5/20.

## 2021-05-20 ENCOUNTER — HOSPITAL ENCOUNTER (OUTPATIENT)
Dept: GENERAL RADIOLOGY | Age: 71
Discharge: HOME | End: 2021-05-20
Attending: INTERNAL MEDICINE
Payer: MEDICARE

## 2021-05-20 ENCOUNTER — OFFICE VISIT (OUTPATIENT)
Dept: INTERNAL MEDICINE CLINIC | Facility: CLINIC | Age: 71
End: 2021-05-20
Payer: MEDICARE

## 2021-05-20 ENCOUNTER — LAB ENCOUNTER (OUTPATIENT)
Dept: LAB | Age: 71
End: 2021-05-20
Attending: INTERNAL MEDICINE
Payer: MEDICARE

## 2021-05-20 ENCOUNTER — TELEPHONE (OUTPATIENT)
Dept: FAMILY MEDICINE CLINIC | Facility: CLINIC | Age: 71
End: 2021-05-20

## 2021-05-20 VITALS
BODY MASS INDEX: 26.84 KG/M2 | HEART RATE: 77 BPM | SYSTOLIC BLOOD PRESSURE: 128 MMHG | TEMPERATURE: 98 F | HEIGHT: 63 IN | DIASTOLIC BLOOD PRESSURE: 63 MMHG | WEIGHT: 151.5 LBS

## 2021-05-20 DIAGNOSIS — E11.9 TYPE 2 DIABETES MELLITUS WITHOUT COMPLICATION, WITHOUT LONG-TERM CURRENT USE OF INSULIN (HCC): ICD-10-CM

## 2021-05-20 DIAGNOSIS — E11.69 HYPERLIPIDEMIA ASSOCIATED WITH TYPE 2 DIABETES MELLITUS (HCC): ICD-10-CM

## 2021-05-20 DIAGNOSIS — R19.7 DIARRHEA, UNSPECIFIED TYPE: ICD-10-CM

## 2021-05-20 DIAGNOSIS — M85.80 OSTEOPENIA, UNSPECIFIED LOCATION: ICD-10-CM

## 2021-05-20 DIAGNOSIS — Z00.00 MEDICARE ANNUAL WELLNESS VISIT, SUBSEQUENT: ICD-10-CM

## 2021-05-20 DIAGNOSIS — M54.50 ACUTE BILATERAL LOW BACK PAIN WITHOUT SCIATICA: ICD-10-CM

## 2021-05-20 DIAGNOSIS — H40.9 GLAUCOMA, UNSPECIFIED GLAUCOMA TYPE, UNSPECIFIED LATERALITY: ICD-10-CM

## 2021-05-20 DIAGNOSIS — Z00.00 MEDICARE ANNUAL WELLNESS VISIT, SUBSEQUENT: Primary | ICD-10-CM

## 2021-05-20 DIAGNOSIS — Z12.11 COLON CANCER SCREENING: ICD-10-CM

## 2021-05-20 DIAGNOSIS — C50.911 HORMONE RECEPTOR POSITIVE BREAST CANCER, RIGHT (HCC): ICD-10-CM

## 2021-05-20 DIAGNOSIS — E78.5 HYPERLIPIDEMIA ASSOCIATED WITH TYPE 2 DIABETES MELLITUS (HCC): ICD-10-CM

## 2021-05-20 DIAGNOSIS — I70.0 ATHEROSCLEROSIS OF AORTA (HCC): ICD-10-CM

## 2021-05-20 DIAGNOSIS — I15.2 HYPERTENSION ASSOCIATED WITH TYPE 2 DIABETES MELLITUS (HCC): ICD-10-CM

## 2021-05-20 DIAGNOSIS — E11.59 HYPERTENSION ASSOCIATED WITH TYPE 2 DIABETES MELLITUS (HCC): ICD-10-CM

## 2021-05-20 DIAGNOSIS — E53.8 VITAMIN B12 DEFICIENCY: ICD-10-CM

## 2021-05-20 PROCEDURE — G0439 PPPS, SUBSEQ VISIT: HCPCS | Performed by: INTERNAL MEDICINE

## 2021-05-20 PROCEDURE — 36415 COLL VENOUS BLD VENIPUNCTURE: CPT

## 2021-05-20 PROCEDURE — 3074F SYST BP LT 130 MM HG: CPT | Performed by: INTERNAL MEDICINE

## 2021-05-20 PROCEDURE — 83036 HEMOGLOBIN GLYCOSYLATED A1C: CPT

## 2021-05-20 PROCEDURE — 82043 UR ALBUMIN QUANTITATIVE: CPT

## 2021-05-20 PROCEDURE — 85025 COMPLETE CBC W/AUTO DIFF WBC: CPT

## 2021-05-20 PROCEDURE — 3078F DIAST BP <80 MM HG: CPT | Performed by: INTERNAL MEDICINE

## 2021-05-20 PROCEDURE — 96160 PT-FOCUSED HLTH RISK ASSMT: CPT | Performed by: INTERNAL MEDICINE

## 2021-05-20 PROCEDURE — 80053 COMPREHEN METABOLIC PANEL: CPT

## 2021-05-20 PROCEDURE — 72110 X-RAY EXAM L-2 SPINE 4/>VWS: CPT | Performed by: INTERNAL MEDICINE

## 2021-05-20 PROCEDURE — 87493 C DIFF AMPLIFIED PROBE: CPT

## 2021-05-20 PROCEDURE — 3008F BODY MASS INDEX DOCD: CPT | Performed by: INTERNAL MEDICINE

## 2021-05-20 PROCEDURE — 99397 PER PM REEVAL EST PAT 65+ YR: CPT | Performed by: INTERNAL MEDICINE

## 2021-05-20 PROCEDURE — 80061 LIPID PANEL: CPT

## 2021-05-20 PROCEDURE — 82570 ASSAY OF URINE CREATININE: CPT

## 2021-05-20 PROCEDURE — 82607 VITAMIN B-12: CPT | Performed by: INTERNAL MEDICINE

## 2021-05-20 PROCEDURE — 3044F HG A1C LEVEL LT 7.0%: CPT | Performed by: INTERNAL MEDICINE

## 2021-05-20 PROCEDURE — 3061F NEG MICROALBUMINURIA REV: CPT | Performed by: INTERNAL MEDICINE

## 2021-05-20 NOTE — TELEPHONE ENCOUNTER
The patient calling to state she cannot open the c diff result in mychart. I stated the result is not available as yet. She stated ok and thank you.

## 2021-05-20 NOTE — PROGRESS NOTES
HPI:   Riaz Najera is a 72year old female who presents for a Medicare Subsequent Annual Wellness visit (Pt already had Initial Annual Wellness).       Annual Physical due on 05/20/2022        Fall/Risk Assessment   She has been screened for Falls and from Last 3 Encounters:  05/20/21 : 151 lb 8 oz (68.7 kg)  05/14/21 : 151 lb (68.5 kg)  02/03/21 : 154 lb 6.4 oz (70 kg)     Last Cholesterol Labs:   Lab Results   Component Value Date    CHOLEST 177 05/20/2021    HDL 64 (H) 05/20/2021    LDL 62 05/20/2021 She  has a past medical history of Allergic rhinitis (3/15/2011), Cataracts, bilateral, Diabetes ([address] Diabetes mellitus, type II ([address] (2012), Ductal carcinoma in situ of right breast (2010), Endometrial thickening on ultra sound (8/11/2014), Glaucoma, vaginal discharge or itching, no complaint of urinary incontinence No hematuria  MUSCULOSKELETAL: denies back pain  NEURO: denies headaches  PSYCHE: denies depression or anxiety  HEMATOLOGIC: denies hx of anemia  ENDOCRINE: denies thyroid history  ALL/ASTH General Appearance:  Alert, cooperative, no distress, appears stated age   Head:  Normocephalic, without obvious abnormality, atraumatic   Eyes:  PERRL, conjunctiva/corneas clear, EOM's intact both eyes   Ears:  Normal TM's and external ear canals, bot Medicare annual wellness visit, subsequent  (primary encounter diagnosis)  Plan: CBC WITH DIFFERENTIAL WITH PLATELET        Routine labs have been ordered. The patient ready had her Covid vaccination.   Get her Shingrix vaccine.    (I70.0) Atherosclerosis treatment plan. Reinforced healthy diet, lifestyle, and exercise. No follow-ups on file.      Yasmani Durán MD, 5/20/2021     General Health     In the past six months, have you lost more than 10 pounds without trying?: 2 - No  Has your appetite preventive care reminders to display for this patient. Update Health Maintenance if applicable    Chlamydia  Annually if high risk No results found for: CHLAMYDIA No flowsheet data found.     Screening Mammogram      Mammogram Annually to 77, then as discus Date Value   05/20/2021 6.4 (H)       No flowsheet data found.     Creat/alb ratio  Annually Malb/Cre Calc (no units)   Date Value   05/20/2021      Comment:     Unable to calculate due to Urine Creatinine <13.00 mg/dL    Unable to calculate due to Urine

## 2021-05-22 RX ORDER — LOSARTAN POTASSIUM 100 MG/1
TABLET ORAL
Qty: 90 TABLET | Refills: 1 | Status: SHIPPED | OUTPATIENT
Start: 2021-05-22 | End: 2021-11-09

## 2021-05-23 PROBLEM — R00.2 PALPITATION: Status: RESOLVED | Noted: 2019-10-03 | Resolved: 2021-05-23

## 2021-05-23 PROBLEM — R19.7 DIARRHEA: Status: ACTIVE | Noted: 2021-05-23

## 2021-05-23 PROBLEM — M54.50 ACUTE BILATERAL LOW BACK PAIN WITHOUT SCIATICA: Status: RESOLVED | Noted: 2021-02-03 | Resolved: 2021-05-23

## 2021-05-23 PROBLEM — Z00.00 MEDICARE ANNUAL WELLNESS VISIT, SUBSEQUENT: Status: ACTIVE | Noted: 2018-11-02

## 2021-05-23 PROBLEM — M79.671 CHRONIC HEEL PAIN, RIGHT: Status: RESOLVED | Noted: 2019-04-07 | Resolved: 2021-05-23

## 2021-05-23 PROBLEM — G89.29 CHRONIC HEEL PAIN, RIGHT: Status: RESOLVED | Noted: 2019-04-07 | Resolved: 2021-05-23

## 2021-05-23 PROBLEM — Z92.89 HISTORY OF POSITIVE PPD: Status: RESOLVED | Noted: 2020-05-09 | Resolved: 2021-05-23

## 2021-05-27 ENCOUNTER — LAB ENCOUNTER (OUTPATIENT)
Dept: LAB | Age: 71
End: 2021-05-27
Attending: INTERNAL MEDICINE
Payer: MEDICARE

## 2021-05-27 DIAGNOSIS — R19.7 DIARRHEA, UNSPECIFIED TYPE: ICD-10-CM

## 2021-05-27 PROCEDURE — 87045 FECES CULTURE AEROBIC BACT: CPT

## 2021-05-27 PROCEDURE — 87427 SHIGA-LIKE TOXIN AG IA: CPT

## 2021-05-27 PROCEDURE — 87046 STOOL CULTR AEROBIC BACT EA: CPT

## 2021-06-14 ENCOUNTER — TELEPHONE (OUTPATIENT)
Dept: CASE MANAGEMENT | Age: 71
End: 2021-06-14

## 2021-06-14 DIAGNOSIS — Z12.11 COLON CANCER SCREENING: Primary | ICD-10-CM

## 2021-06-17 ENCOUNTER — TELEPHONE (OUTPATIENT)
Dept: INTERNAL MEDICINE CLINIC | Facility: CLINIC | Age: 71
End: 2021-06-17

## 2021-06-18 ENCOUNTER — TELEPHONE (OUTPATIENT)
Dept: INTERNAL MEDICINE CLINIC | Facility: CLINIC | Age: 71
End: 2021-06-18

## 2021-06-18 DIAGNOSIS — H40.9 GLAUCOMA, UNSPECIFIED GLAUCOMA TYPE, UNSPECIFIED LATERALITY: Primary | ICD-10-CM

## 2021-06-18 NOTE — TELEPHONE ENCOUNTER
Patient called requesting a referral to see ophthalmology for follow up of glaucoma. Appt is scheduled for Tuesday June 22nd. Please sign referral in in agreement.      Thank you, Killian

## 2021-06-19 NOTE — TELEPHONE ENCOUNTER
Spoke with pt  verified. Notified pt of signed referral she requested to have it faxed to dr. Luis Second office. Could not receive fax was on hold very long time, advised pt referral can be found in mychart.  Pt verbalized understanding

## 2021-07-02 ENCOUNTER — TELEPHONE (OUTPATIENT)
Dept: INTERNAL MEDICINE CLINIC | Facility: CLINIC | Age: 71
End: 2021-07-02

## 2021-07-02 DIAGNOSIS — Z12.31 ENCOUNTER FOR SCREENING MAMMOGRAM FOR BREAST CANCER: Primary | ICD-10-CM

## 2021-07-03 NOTE — TELEPHONE ENCOUNTER
No answer, left message of Dr. Chew Poster message below  Gave number Advised to call central scheduling to make appt, and call back for any questions if needed

## 2021-07-14 ENCOUNTER — OFFICE VISIT (OUTPATIENT)
Dept: INTERNAL MEDICINE CLINIC | Facility: CLINIC | Age: 71
End: 2021-07-14
Payer: MEDICARE

## 2021-07-14 VITALS
BODY MASS INDEX: 26.75 KG/M2 | SYSTOLIC BLOOD PRESSURE: 119 MMHG | HEIGHT: 63 IN | HEART RATE: 73 BPM | WEIGHT: 151 LBS | DIASTOLIC BLOOD PRESSURE: 68 MMHG

## 2021-07-14 DIAGNOSIS — E11.69 HYPERLIPIDEMIA ASSOCIATED WITH TYPE 2 DIABETES MELLITUS (HCC): ICD-10-CM

## 2021-07-14 DIAGNOSIS — E11.9 TYPE 2 DIABETES MELLITUS WITHOUT COMPLICATION, WITHOUT LONG-TERM CURRENT USE OF INSULIN (HCC): Primary | ICD-10-CM

## 2021-07-14 DIAGNOSIS — R07.2 PRECORDIAL PAIN: ICD-10-CM

## 2021-07-14 DIAGNOSIS — E11.59 HYPERTENSION ASSOCIATED WITH TYPE 2 DIABETES MELLITUS (HCC): ICD-10-CM

## 2021-07-14 DIAGNOSIS — E78.5 HYPERLIPIDEMIA ASSOCIATED WITH TYPE 2 DIABETES MELLITUS (HCC): ICD-10-CM

## 2021-07-14 DIAGNOSIS — I15.2 HYPERTENSION ASSOCIATED WITH TYPE 2 DIABETES MELLITUS (HCC): ICD-10-CM

## 2021-07-14 PROCEDURE — 3008F BODY MASS INDEX DOCD: CPT | Performed by: INTERNAL MEDICINE

## 2021-07-14 PROCEDURE — 99214 OFFICE O/P EST MOD 30 MIN: CPT | Performed by: INTERNAL MEDICINE

## 2021-07-14 PROCEDURE — 93000 ELECTROCARDIOGRAM COMPLETE: CPT | Performed by: INTERNAL MEDICINE

## 2021-07-14 PROCEDURE — 3078F DIAST BP <80 MM HG: CPT | Performed by: INTERNAL MEDICINE

## 2021-07-14 PROCEDURE — 3074F SYST BP LT 130 MM HG: CPT | Performed by: INTERNAL MEDICINE

## 2021-07-14 NOTE — PROGRESS NOTES
HPI/Subjective:     Patient ID: Markel Quispe is a 80year old female. Diabetes  She presents for her follow-up diabetic visit. She has type 2 diabetes mellitus. Her disease course has been stable. There are no hypoglycemic associated symptoms.  Assoc the lateral region. The pain is mild. The quality of the pain is described as sharp. The pain does not radiate. Pertinent negatives include no palpitations or shortness of breath. Associated symptoms comments: None.  The pain is aggravated by nothing (lali (TRUE METRIX BLOOD GLUCOSE TEST) In Vitro Strip 1 each by In Vitro route daily.  100 strip 0   • Blood Glucose Monitoring Suppl (ACCU-CHEK GLADIS PLUS) w/Device Does not apply Kit Use to check glucose daily 1 kit 0   • Glucose Blood (ACCU-CHEK GLADIS PLUS) In • Cancer Sister         breast cancer   • Breast Cancer Maternal Aunt         post   • Breast Cancer Self 61   • DCIS Self 61      Social History: Social History    Tobacco Use      Smoking status: Never Smoker      Smokeless tobacco: Never Used    Vapin continuechol med and low chol diet.     (E11.59,  I15.2) Hypertension associated with type 2 diabetes mellitus [facility]  Plan: bp controlled with current bp meds.  cpm .    (R07.2) Precordial pain  Plan: ELECTROCARDIOGRAM, COMPLETE        Pt's pain is on the l

## 2021-07-25 RX ORDER — ATORVASTATIN CALCIUM 40 MG/1
TABLET, FILM COATED ORAL
Qty: 90 TABLET | Refills: 0 | Status: SHIPPED | OUTPATIENT
Start: 2021-07-25 | End: 2021-10-27

## 2021-08-04 ENCOUNTER — TELEPHONE (OUTPATIENT)
Dept: INTERNAL MEDICINE CLINIC | Facility: CLINIC | Age: 71
End: 2021-08-04

## 2021-08-04 DIAGNOSIS — R25.2 LEG CRAMPS: Primary | ICD-10-CM

## 2021-08-04 NOTE — TELEPHONE ENCOUNTER
Incoming call from [phone] heard on line.     Called above # back--left message to call back--unknown reason for call

## 2021-08-04 NOTE — TELEPHONE ENCOUNTER
Patient states she was seen in office visit 7/14/21 and forgot to mention to Dr. Jeannie Delgado that she would like a referral to a vascular doctor.  Patient states she has been having rosenda horse cramps in both calves and at times numbness in her right leg a

## 2021-08-05 NOTE — TELEPHONE ENCOUNTER
From   Tere Davis To   Co, Lisbeth Landers Sent and Delivered   8/5/2021  8:04 AM   Last Read in [employer]   8/5/2021  8:05 AM by Lisbeth Rosario     [employer] message regarding referral sent by [facility].

## 2021-08-31 ENCOUNTER — TELEPHONE (OUTPATIENT)
Dept: INTERNAL MEDICINE CLINIC | Facility: CLINIC | Age: 71
End: 2021-08-31

## 2021-08-31 DIAGNOSIS — Z92.89 HISTORY OF POSITIVE PPD: Primary | ICD-10-CM

## 2021-08-31 NOTE — TELEPHONE ENCOUNTER
Spoke with pt, verified , informed pt that order for chest xray has been placed, pt verbalized understanding.

## 2021-09-01 NOTE — TELEPHONE ENCOUNTER
Per pharmacy pt needs new script fpr the following medications.     ACCU-CHEK GLADIS PLUS METER  ACCU-CHEK GLADIS PLUS TEST STRIPS AND  ACCU-CHEK SOFTCLIX LANCETS 100 rolling walker

## 2021-09-07 ENCOUNTER — HOSPITAL ENCOUNTER (OUTPATIENT)
Dept: GENERAL RADIOLOGY | Age: 71
Discharge: HOME | End: 2021-09-07
Attending: INTERNAL MEDICINE
Payer: MEDICARE

## 2021-09-07 ENCOUNTER — HOSPITAL ENCOUNTER (OUTPATIENT)
Dept: MAMMOGRAPHY | Age: 71
Discharge: HOME | End: 2021-09-07
Attending: INTERNAL MEDICINE
Payer: MEDICARE

## 2021-09-07 DIAGNOSIS — Z92.89 HISTORY OF POSITIVE PPD: ICD-10-CM

## 2021-09-07 DIAGNOSIS — Z12.31 ENCOUNTER FOR SCREENING MAMMOGRAM FOR BREAST CANCER: ICD-10-CM

## 2021-09-07 PROCEDURE — 71046 X-RAY EXAM CHEST 2 VIEWS: CPT | Performed by: INTERNAL MEDICINE

## 2021-09-07 PROCEDURE — 77063 BREAST TOMOSYNTHESIS BI: CPT | Performed by: INTERNAL MEDICINE

## 2021-09-07 PROCEDURE — 77067 SCR MAMMO BI INCL CAD: CPT | Performed by: INTERNAL MEDICINE

## 2021-10-22 RX ORDER — AMLODIPINE BESYLATE 2.5 MG/1
2.5 TABLET ORAL DAILY
Qty: 90 TABLET | Refills: 1 | Status: SHIPPED | OUTPATIENT
Start: 2021-10-22 | End: 2022-03-30

## 2021-10-22 NOTE — TELEPHONE ENCOUNTER
Refill passed per [facility], [facility] protocol.   Requested Prescriptions   Pending Prescriptions Disp Refills    AMLODIPINE 2.5 MG Oral Tab [Pharmacy Med Name: AMLODIPINE BESYLATE 2.5MG TABLETS] 90 tablet 1     Sig: TAKE 1 TABLET(2.5 MG) BY MOUTH DAILY        Hypertensive Medications Protocol Passed - 10/22/2021 12:07 PM        Passed - CMP or BMP in past 12 months        Passed - Appointment in past 6 or next 3 months        Passed - GFR Non- > 50     Lab Results   Component Value Date    GFRNAA 92 05/20/2021                      Recent Outpatient Visits              3 weeks ago Leg pain, bilateral    Vascular - Nasim Cartagena MD    Office Visit    3 months ago Type 2 diabetes mellitus without complication, without long-term current use of insulin [facility])    Heidy Saucedo MD    Office Visit    5 months ago Yaima Stevenson annual wellness visit, subsequent    Heidy Saucedo MD    Office Visit    5 months ago Colon cancer screening    Gastroenterology - Mattie Pizano MD    Office Visit    6 months ago     Gastoenterology - Tatianna Castañeda Dr, Renetta Abraham    Nurse Only

## 2021-10-27 RX ORDER — ATORVASTATIN CALCIUM 40 MG/1
40 TABLET, FILM COATED ORAL NIGHTLY
Qty: 90 TABLET | Refills: 1 | Status: SHIPPED | OUTPATIENT
Start: 2021-10-27

## 2021-10-27 NOTE — TELEPHONE ENCOUNTER
Refill passed per [facility] protocol.     Requested Prescriptions   Pending Prescriptions Disp Refills    ATORVASTATIN 40 MG Oral Tab [Pharmacy Med Name: ATORVASTATIN 40MG TABLETS] 90 tablet 0     Sig: TAKE 1 TABLET(40 MG) BY MOUTH EVERY NIGHT

## 2021-11-09 RX ORDER — LOSARTAN POTASSIUM 100 MG/1
100 TABLET ORAL DAILY
Qty: 90 TABLET | Refills: 1 | Status: SHIPPED | OUTPATIENT
Start: 2021-11-09

## 2021-11-10 NOTE — TELEPHONE ENCOUNTER
Refill passed per [facility] protocol   Requested Prescriptions   Pending Prescriptions Disp Refills    LOSARTAN 100 MG Oral Tab [Pharmacy Med Name: LOSARTAN 100MG TABLETS] 90 tablet 1     Sig: TAKE 1 TABLET BY MOUTH DAILY        Hypertensive Medicati

## 2021-11-12 ENCOUNTER — TELEPHONE (OUTPATIENT)
Dept: INTERNAL MEDICINE CLINIC | Facility: CLINIC | Age: 71
End: 2021-11-12

## 2021-11-12 DIAGNOSIS — R07.2 PRECORDIAL PAIN: Primary | ICD-10-CM

## 2021-11-12 DIAGNOSIS — J31.2 CHRONIC SORE THROAT: ICD-10-CM

## 2021-11-12 NOTE — TELEPHONE ENCOUNTER
I received call from pt and she was requesting 2 referrals. The first one is for Cardiology. She stated that she had seen you for her upper chest pain and you had informed her to let you know if the pain radiated.  Pt stated that the pain has

## 2021-11-17 ENCOUNTER — NURSE TRIAGE (OUTPATIENT)
Dept: INTERNAL MEDICINE CLINIC | Facility: CLINIC | Age: 71
End: 2021-11-17

## 2021-11-17 DIAGNOSIS — N20.1 RIGHT URETERAL STONE: Primary | ICD-10-CM

## 2021-11-17 NOTE — TELEPHONE ENCOUNTER
Action Requested: Summary for Provider     []  Critical Lab, Recommendations Needed  [] Need Additional Advice  []   FYI    []   Need Orders  [] Need Medications Sent to Pharmacy  []  Other     SUMMARY:sent to ER, pt stated at 1 PM she started having const

## 2021-11-19 NOTE — TELEPHONE ENCOUNTER
Patient calling, confirmed name and . Patient states she was just discharged from [facility] for kidney stones. She states that she is feeling better, is currently pain free and is taking her antibiotics as prescribed.     She was offered a fo

## 2021-11-20 NOTE — TELEPHONE ENCOUNTER
Spoke with patient, (  verified ) informed that referral has been placed     Provided info for Dr. Dante Dhaliwal  [phone]  Patient verbalizes understanding and agrees.

## 2021-11-22 ENCOUNTER — TELEPHONE (OUTPATIENT)
Dept: INTERNAL MEDICINE CLINIC | Facility: CLINIC | Age: 71
End: 2021-11-22

## 2021-11-22 NOTE — TELEPHONE ENCOUNTER
Spoke with patient, found sooner appointment for tomorrow at [address] [facility] [address] with Amy Stevenson. Advised patient to bring records with her. Patient states she does not have copy of CT or xray on discharge papers.  Patient states she was advised by Urologist at [facility]

## 2021-11-22 NOTE — TELEPHONE ENCOUNTER
Spoke with patient ( verified)--was admitted to [facility] - for kidney stone--needs extracorporeal shock wave therapy to break up kidney stone. She did call for appt with urology, but soonest appt is 2021.     Sent to Dr. Gilma Garrido as Felisa Salter

## 2021-11-23 ENCOUNTER — OFFICE VISIT (OUTPATIENT)
Dept: SURGERY | Facility: CLINIC | Age: 71
End: 2021-11-23
Payer: MEDICARE

## 2021-11-23 ENCOUNTER — HOSPITAL ENCOUNTER (OUTPATIENT)
Dept: GENERAL RADIOLOGY | Facility: HOSPITAL | Age: 71
Discharge: HOME | End: 2021-11-23
Attending: UROLOGY
Payer: MEDICARE

## 2021-11-23 VITALS
BODY MASS INDEX: 26.58 KG/M2 | HEIGHT: 63 IN | DIASTOLIC BLOOD PRESSURE: 71 MMHG | WEIGHT: 150 LBS | SYSTOLIC BLOOD PRESSURE: 137 MMHG | HEART RATE: 74 BPM

## 2021-11-23 DIAGNOSIS — N20.1 RIGHT URETERAL STONE: Primary | ICD-10-CM

## 2021-11-23 DIAGNOSIS — N20.1 RIGHT URETERAL STONE: ICD-10-CM

## 2021-11-23 PROCEDURE — 3078F DIAST BP <80 MM HG: CPT | Performed by: UROLOGY

## 2021-11-23 PROCEDURE — 74018 RADEX ABDOMEN 1 VIEW: CPT | Performed by: UROLOGY

## 2021-11-23 PROCEDURE — 3008F BODY MASS INDEX DOCD: CPT | Performed by: UROLOGY

## 2021-11-23 PROCEDURE — 3075F SYST BP GE 130 - 139MM HG: CPT | Performed by: UROLOGY

## 2021-11-23 PROCEDURE — 99204 OFFICE O/P NEW MOD 45 MIN: CPT | Performed by: UROLOGY

## 2021-11-23 RX ORDER — HYDROCODONE BITARTRATE AND ACETAMINOPHEN 5; 325 MG/1; MG/1
1 TABLET ORAL EVERY 6 HOURS PRN
Qty: 20 TABLET | Refills: 0 | Status: SHIPPED | OUTPATIENT
Start: 2021-11-23 | End: 2021-12-31 | Stop reason: ALTCHOICE

## 2021-11-23 NOTE — PROGRESS NOTES
SUBJECTIVE:  Atilio Angulo is a 80year old female who presents for a consultation at the request of, and a copy of this note will be sent to, Dr. Lila Brittle, for evaluation of right ureteral stone. She states that the problem is resolved.  Sym prophylaxis for 6 months    • Unspecified essential hypertension    • Vitamin B12 deficiency anemia 1/3/2008   • Vitamin D deficiency 10/31/2011      Past Surgical History:   Procedure Laterality Date   • BIOPSY OF BREAST, NEEDLE CORE Bilateral 2011   • BR malaise and fatigue. Positive for:  None  All other review of systems reviewed and otherwise negative    OBJECTIVE:  /71   Pulse 74   Ht 5' 3\" (1.6 m)   Wt 150 lb (68 kg)   BMI 26.57 kg/m²   She appears well, in no apparent distress.   Alert and orie

## 2021-11-23 NOTE — TELEPHONE ENCOUNTER
Still proceed with her consult with Dr Shai Brown; he can then decide about additional test; pt should be able to get her films at least from the radiology dept of hospital she went to before and bring them with her on visit.

## 2021-11-23 NOTE — TELEPHONE ENCOUNTER
Office Visit  Open     11/23/2021  [facility] for Veronica Tapia MD    UROLOGY  Right ureteral stone    Dx  Kidney Problem ;  Referred by Chuckie Pineda MD    Reason for Visit

## 2021-12-07 ENCOUNTER — OFFICE VISIT (OUTPATIENT)
Dept: OTOLARYNGOLOGY | Facility: CLINIC | Age: 71
End: 2021-12-07
Payer: MEDICARE

## 2021-12-07 VITALS — HEIGHT: 63 IN | BODY MASS INDEX: 26.22 KG/M2 | WEIGHT: 148 LBS

## 2021-12-07 DIAGNOSIS — R42 VERTIGO: Primary | ICD-10-CM

## 2021-12-07 DIAGNOSIS — R05.9 COUGH: ICD-10-CM

## 2021-12-07 PROCEDURE — 99213 OFFICE O/P EST LOW 20 MIN: CPT | Performed by: SPECIALIST

## 2021-12-07 PROCEDURE — 3008F BODY MASS INDEX DOCD: CPT | Performed by: SPECIALIST

## 2021-12-07 RX ORDER — MONTELUKAST SODIUM 10 MG/1
10 TABLET ORAL NIGHTLY
Qty: 30 TABLET | Refills: 3 | Status: SHIPPED | OUTPATIENT
Start: 2021-12-07

## 2021-12-07 NOTE — PATIENT INSTRUCTIONS
Issue on a trial of Singulair for the cough. I also ordered an audiogram Sandhya Álvarez with word discrimination for your intermittent loss of balance. Follow-up in 3 to 4 weeks time, sooner if problems.

## 2021-12-07 NOTE — PROGRESS NOTES
Diogo Alcaraz is a 80year old female. Patient presents with:  Dizziness: pt c/o lightheadness, pt has hx vertigo. Cough: pt c/o chronic cough. HPI:   Dizziness which is intermittent. Also frequent cough.     Current Outpatient Medications   Medica right breast 2010   • Endometrial thickening on ultra sound 8/11/2014   • Glaucoma, right eye    • Lumbar arthropathy 6/12/2018   • Osteoarthritis    • Other and unspecified hyperlipidemia    • Positive PPD 2005    S/P INH prophylaxis for 6 months    • Uns through XII grossly intact. Testing within normal limits     ASSESSMENT AND PLAN:   1. Vertigo  We will get an audiogram ear and bone with word discrimination to better evaluate the intermittent vertigo. 2.  Cough  Consider cough variant asthma.   Trial

## 2021-12-09 ENCOUNTER — TELEPHONE (OUTPATIENT)
Dept: INTERNAL MEDICINE CLINIC | Facility: CLINIC | Age: 71
End: 2021-12-09

## 2021-12-09 DIAGNOSIS — K76.9 HEPATIC LESION: Primary | ICD-10-CM

## 2021-12-09 NOTE — TELEPHONE ENCOUNTER
pls call pt; pt had ct scan done in [facility] 3 weeks ago when she was admitted with kidney stone; incidental finding on her ct scan was 8mm lesion in the right lobe of liver not able to be characterize well in the ct scan so mri of liver was recommended by radiologist (this was discussed also by Dr Matthew Sierra urologist to her on her visits for kidney stone and to ffup); I had ordered the mri of liver and will need PA from [facility] care and once approved, then she can get it done.

## 2021-12-09 NOTE — TELEPHONE ENCOUNTER
Spoke with pt,  verified  Pt was informed of MD recommendation, pt stated understanding. Pt also provided with [facility] care dept tel #. Routed Managed Care dept pls f/u on referral for MRI of the liver, thanks.

## 2021-12-13 ENCOUNTER — NURSE TRIAGE (OUTPATIENT)
Dept: INTERNAL MEDICINE CLINIC | Facility: CLINIC | Age: 71
End: 2021-12-13

## 2021-12-13 DIAGNOSIS — R30.0 DYSURIA: Primary | ICD-10-CM

## 2021-12-13 RX ORDER — CEPHALEXIN 500 MG/1
500 CAPSULE ORAL 2 TIMES DAILY
Qty: 14 CAPSULE | Refills: 0 | Status: SHIPPED | OUTPATIENT
Start: 2021-12-13 | End: 2021-12-31 | Stop reason: ALTCHOICE

## 2021-12-13 NOTE — TELEPHONE ENCOUNTER
Left detailed message for patient (hipaa confirmed), advised to call back with questions or concerns. Will send mobli msg.

## 2021-12-13 NOTE — TELEPHONE ENCOUNTER
Order for ua and culture entered. I will start her on keflex for now while waiting for results. erx sent. Call back if any worsening.

## 2021-12-13 NOTE — TELEPHONE ENCOUNTER
Please reply to pool: EM RN TRIAGE  Action Requested: Summary for Provider     []  Critical Lab, Recommendations Needed  [] Need Additional Advice  []   FYI    [x]   Need Orders  [x] Need Medications Sent to Pharmacy  []  Other     SUMMARY: Patient contact

## 2021-12-14 ENCOUNTER — LAB ENCOUNTER (OUTPATIENT)
Dept: LAB | Age: 71
End: 2021-12-14
Attending: INTERNAL MEDICINE
Payer: MEDICARE

## 2021-12-14 DIAGNOSIS — R30.0 DYSURIA: ICD-10-CM

## 2021-12-14 PROCEDURE — 81001 URINALYSIS AUTO W/SCOPE: CPT

## 2021-12-14 PROCEDURE — 87086 URINE CULTURE/COLONY COUNT: CPT

## 2021-12-14 NOTE — TELEPHONE ENCOUNTER
Conversation: Acute  (Newest Message First)    Kaya Rosario  to Diedra Kayser, RN          12/13/21 2:17 PM  I thank you

## 2021-12-15 ENCOUNTER — TELEPHONE (OUTPATIENT)
Dept: INTERNAL MEDICINE CLINIC | Facility: CLINIC | Age: 71
End: 2021-12-15

## 2021-12-15 NOTE — TELEPHONE ENCOUNTER
Dr. Aubrey Howe, urine culture final in Epic. Please review. Before we contact Pt, she was asking if she should start taking Keflex? Please advise. 0 Result Notes    URINE CULTURE No Growth at 18-24 hrs.             Resulting Agency: [employer] (

## 2021-12-15 NOTE — TELEPHONE ENCOUNTER
The patient calling back and asking for the blood in the urine test and is concerned. She stated Dr. Codey Corrales  prescribe Keflex and she is asking if she should start taking. Manpreet Mora   She was in the hospital for kidney stones 3 weeks ago  and is asking if the

## 2021-12-15 NOTE — TELEPHONE ENCOUNTER
Late entry: Noted in chart pt did urine and culture test on 12/14/21. Culture is still pending. Triage staff to monitor for results with MD advise, then call pt. Triage staff at site to call pt and triage pts' hematuria.   Please reply to pool: EM RN T

## 2021-12-16 ENCOUNTER — TELEPHONE (OUTPATIENT)
Dept: SURGERY | Facility: CLINIC | Age: 71
End: 2021-12-16

## 2021-12-16 NOTE — TELEPHONE ENCOUNTER
RN - Qian Joseph, pt already had upcoming Urology appt, see below. But pt also needs to know Dr. Jayce Armenta recommendation. [employer] sent with details as well. RN called patient. First Call attempt.  Left Voicemail to call back our office or check [employer]

## 2021-12-16 NOTE — TELEPHONE ENCOUNTER
Urine culture is negative so no uti so she doesn't need antibiotics; ffup with urologist for her microhematuria; she may still have ureteral stone.

## 2021-12-16 NOTE — TELEPHONE ENCOUNTER
Patient states she has a CT scheduled for 12/21/21 for kidney stone and states the kidney stone is out already. She is wondering if she still needs to have that appointment.  Please advise

## 2021-12-17 ENCOUNTER — HOSPITAL ENCOUNTER (OUTPATIENT)
Dept: CT IMAGING | Facility: HOSPITAL | Age: 71
Discharge: HOME | End: 2021-12-17
Attending: INTERNAL MEDICINE
Payer: MEDICARE

## 2021-12-17 ENCOUNTER — TELEPHONE (OUTPATIENT)
Dept: SURGERY | Facility: CLINIC | Age: 71
End: 2021-12-17

## 2021-12-17 VITALS
RESPIRATION RATE: 16 BRPM | HEART RATE: 65 BPM | WEIGHT: 148 LBS | HEIGHT: 63 IN | DIASTOLIC BLOOD PRESSURE: 63 MMHG | SYSTOLIC BLOOD PRESSURE: 126 MMHG | BODY MASS INDEX: 26.22 KG/M2

## 2021-12-17 DIAGNOSIS — R07.9 CHEST PAIN, UNSPECIFIED TYPE: ICD-10-CM

## 2021-12-17 DIAGNOSIS — I20.8 ANGINAL EQUIVALENT (HCC): ICD-10-CM

## 2021-12-17 PROCEDURE — 82565 ASSAY OF CREATININE: CPT

## 2021-12-17 PROCEDURE — 75574 CT ANGIO HRT W/3D IMAGE: CPT | Performed by: INTERNAL MEDICINE

## 2021-12-17 RX ORDER — NITROGLYCERIN 0.4 MG/1
0.4 TABLET SUBLINGUAL ONCE
Status: COMPLETED | OUTPATIENT
Start: 2021-12-17 | End: 2021-12-17

## 2021-12-17 RX ORDER — DILTIAZEM HYDROCHLORIDE 5 MG/ML
5 INJECTION INTRAVENOUS SEE ADMIN INSTRUCTIONS
Status: DISCONTINUED | OUTPATIENT
Start: 2021-12-17 | End: 2021-12-19

## 2021-12-17 RX ORDER — METOPROLOL TARTRATE 5 MG/5ML
INJECTION INTRAVENOUS
Status: COMPLETED
Start: 2021-12-17 | End: 2021-12-17

## 2021-12-17 RX ORDER — METOPROLOL TARTRATE 5 MG/5ML
5 INJECTION INTRAVENOUS SEE ADMIN INSTRUCTIONS
Status: DISCONTINUED | OUTPATIENT
Start: 2021-12-17 | End: 2021-12-19

## 2021-12-17 RX ADMIN — Medication 50 MG: at 09:48:00

## 2021-12-17 RX ADMIN — NITROGLYCERIN 0.4 MG: 0.4 TABLET SUBLINGUAL at 10:41:00

## 2021-12-17 RX ADMIN — METOPROLOL TARTRATE 5 MG: 5 INJECTION INTRAVENOUS at 10:40:00

## 2021-12-17 NOTE — IMAGING NOTE
TO RAD HOLDING AT 0935      HX TAKEN : chest pain for past few months    PT CONSENTED AT  0945    BASELINE VITAL SIGNS   HR 66  /63    CTA ORDERED BY DR. BAIG WAS PT GIVEN CTA  PREMEDS NO, IF YES  50 MG PO METOPROLOL X2 DOSES    METOPROLOL PO GIVEN

## 2021-12-17 NOTE — TELEPHONE ENCOUNTER
Per pt she has passed stone, would like to know if 12/21 appt is necessary. Pt did not complete CT.   Please advise

## 2021-12-17 NOTE — TELEPHONE ENCOUNTER
Please advise    Future Appointments   Date Time Provider Zachariah Landeros   12/21/2021 11:30 AM Diane Monroe MD [facility]   1/11/2022 11:10 AM [facility]

## 2021-12-17 NOTE — TELEPHONE ENCOUNTER
She does not need CT scan but should follow-up for an office visit as scheduled and bring that stone with her that she passed.

## 2021-12-22 ENCOUNTER — NURSE TRIAGE (OUTPATIENT)
Dept: FAMILY MEDICINE CLINIC | Facility: CLINIC | Age: 71
End: 2021-12-22

## 2021-12-22 NOTE — TELEPHONE ENCOUNTER
Action Requested: Summary for Provider     []  Critical Lab, Recommendations Needed  [] Need Additional Advice  []   FYI    []   Need Orders  [] Need Medications Sent to Pharmacy  []  Other     SUMMARY: Per protocol advised : Office visit  Within 2 weels

## 2021-12-31 ENCOUNTER — TELEPHONE (OUTPATIENT)
Dept: INTERNAL MEDICINE CLINIC | Facility: CLINIC | Age: 71
End: 2021-12-31

## 2021-12-31 ENCOUNTER — HOSPITAL ENCOUNTER (OUTPATIENT)
Dept: GENERAL RADIOLOGY | Age: 71
Discharge: HOME | End: 2021-12-31
Attending: INTERNAL MEDICINE
Payer: MEDICARE

## 2021-12-31 ENCOUNTER — OFFICE VISIT (OUTPATIENT)
Dept: INTERNAL MEDICINE CLINIC | Facility: CLINIC | Age: 71
End: 2021-12-31
Payer: MEDICARE

## 2021-12-31 VITALS
SYSTOLIC BLOOD PRESSURE: 108 MMHG | DIASTOLIC BLOOD PRESSURE: 60 MMHG | BODY MASS INDEX: 26.4 KG/M2 | RESPIRATION RATE: 18 BRPM | HEART RATE: 73 BPM | WEIGHT: 149 LBS | HEIGHT: 63 IN

## 2021-12-31 DIAGNOSIS — M25.511 ACUTE PAIN OF RIGHT SHOULDER: Primary | ICD-10-CM

## 2021-12-31 DIAGNOSIS — M25.511 ACUTE PAIN OF RIGHT SHOULDER: ICD-10-CM

## 2021-12-31 PROCEDURE — 73030 X-RAY EXAM OF SHOULDER: CPT | Performed by: INTERNAL MEDICINE

## 2021-12-31 PROCEDURE — 3074F SYST BP LT 130 MM HG: CPT | Performed by: INTERNAL MEDICINE

## 2021-12-31 PROCEDURE — 3008F BODY MASS INDEX DOCD: CPT | Performed by: INTERNAL MEDICINE

## 2021-12-31 PROCEDURE — 99213 OFFICE O/P EST LOW 20 MIN: CPT | Performed by: INTERNAL MEDICINE

## 2021-12-31 PROCEDURE — 3078F DIAST BP <80 MM HG: CPT | Performed by: INTERNAL MEDICINE

## 2021-12-31 NOTE — TELEPHONE ENCOUNTER
Hi Managed care    I had ordered mri liver for this patient back in 12/9/21 and the approval still pending;  pls ffup the preauthorization so I can have pt do the test. Thanks .

## 2021-12-31 NOTE — PROGRESS NOTES
Subjective:     Patient ID: Speedy Glass is a 80year old female. Shoulder Pain   The pain is present in the right shoulder. This is a new problem. The current episode started more than 1 month ago. There has been no history of extremity trauma.  Epi Reported on 12/31/2021) 1 kit 0   • Glucose Blood (TRUE METRIX BLOOD GLUCOSE TEST) In Vitro Strip 1 each by In Vitro route daily.  (Patient not taking: Reported on 12/31/2021) 100 strip 0   • Blood Glucose Monitoring Suppl (ACCU-CHEK GLADIS PLUS) w/Device Do • Heart Disease Mother 68        CAD   • Hypertension Mother         mycardial infarction   • Musculo-skelatal Disorder Mother         osteoporosis   • Heart Disorder Mother    • Breast Cancer Sister 48        chemo; doing fine   • Cancer Sister Visit:  Requested Prescriptions      No prescriptions requested or ordered in this encounter       Imaging & Referrals:  None

## 2022-01-02 ENCOUNTER — TELEPHONE (OUTPATIENT)
Dept: INTERNAL MEDICINE CLINIC | Facility: CLINIC | Age: 72
End: 2022-01-02

## 2022-01-02 DIAGNOSIS — M25.511 ACUTE PAIN OF RIGHT SHOULDER: Primary | ICD-10-CM

## 2022-01-08 ENCOUNTER — TELEPHONE (OUTPATIENT)
Dept: INTERNAL MEDICINE CLINIC | Facility: CLINIC | Age: 72
End: 2022-01-08

## 2022-01-08 ENCOUNTER — LAB ENCOUNTER (OUTPATIENT)
Dept: LAB | Age: 72
End: 2022-01-08
Attending: INTERNAL MEDICINE
Payer: MEDICARE

## 2022-01-08 DIAGNOSIS — E11.69 HYPERLIPIDEMIA ASSOCIATED WITH TYPE 2 DIABETES MELLITUS: ICD-10-CM

## 2022-01-08 DIAGNOSIS — E11.69 HYPERLIPIDEMIA ASSOCIATED WITH TYPE 2 DIABETES MELLITUS (HCC): ICD-10-CM

## 2022-01-08 DIAGNOSIS — E11.9 TYPE 2 DIABETES MELLITUS WITHOUT COMPLICATION, WITHOUT LONG-TERM CURRENT USE OF INSULIN (HCC): Primary | ICD-10-CM

## 2022-01-08 DIAGNOSIS — E78.5 HYPERLIPIDEMIA ASSOCIATED WITH TYPE 2 DIABETES MELLITUS: ICD-10-CM

## 2022-01-08 DIAGNOSIS — E78.5 HYPERLIPIDEMIA ASSOCIATED WITH TYPE 2 DIABETES MELLITUS (HCC): ICD-10-CM

## 2022-01-08 DIAGNOSIS — E11.9 TYPE 2 DIABETES MELLITUS WITHOUT COMPLICATION, WITHOUT LONG-TERM CURRENT USE OF INSULIN (HCC): ICD-10-CM

## 2022-01-08 LAB
ALBUMIN SERPL-MCNC: 4.2 G/DL (ref 3.4–5)
ALBUMIN/GLOB SERPL: 1.4 {RATIO} (ref 1–2)
ALP LIVER SERPL-CCNC: 70 U/L
ALT SERPL-CCNC: 35 U/L
ANION GAP SERPL CALC-SCNC: 5 MMOL/L (ref 0–18)
AST SERPL-CCNC: 19 U/L (ref 15–37)
BILIRUB SERPL-MCNC: 0.5 MG/DL (ref 0.1–2)
BUN BLD-MCNC: 16 MG/DL (ref 7–18)
BUN/CREAT SERPL: 25.8 (ref 10–20)
CALCIUM BLD-MCNC: 9.5 MG/DL (ref 8.5–10.1)
CHLORIDE SERPL-SCNC: 105 MMOL/L (ref 98–112)
CHOLEST SERPL-MCNC: 146 MG/DL (ref ?–200)
CO2 SERPL-SCNC: 30 MMOL/L (ref 21–32)
CREAT BLD-MCNC: 0.62 MG/DL
CREAT UR-SCNC: 16 MG/DL
EST. AVERAGE GLUCOSE BLD GHB EST-MCNC: 134 MG/DL (ref 68–126)
FASTING PATIENT LIPID ANSWER: YES
FASTING STATUS PATIENT QL REPORTED: YES
GLOBULIN PLAS-MCNC: 3 G/DL (ref 2.8–4.4)
GLUCOSE BLD-MCNC: 92 MG/DL (ref 70–99)
HBA1C MFR BLD: 6.3 % (ref ?–5.7)
HDLC SERPL-MCNC: 58 MG/DL (ref 40–59)
LDLC SERPL CALC-MCNC: 62 MG/DL (ref ?–100)
MICROALBUMIN UR-MCNC: 0.67 MG/DL
MICROALBUMIN/CREAT 24H UR-RTO: 41.9 UG/MG (ref ?–30)
NONHDLC SERPL-MCNC: 88 MG/DL (ref ?–130)
OSMOLALITY SERPL CALC.SUM OF ELEC: 291 MOSM/KG (ref 275–295)
POTASSIUM SERPL-SCNC: 4.2 MMOL/L (ref 3.5–5.1)
PROT SERPL-MCNC: 7.2 G/DL (ref 6.4–8.2)
SODIUM SERPL-SCNC: 140 MMOL/L (ref 136–145)
TRIGL SERPL-MCNC: 152 MG/DL (ref 30–149)
VLDLC SERPL CALC-MCNC: 23 MG/DL (ref 0–30)

## 2022-01-08 PROCEDURE — 3061F NEG MICROALBUMINURIA REV: CPT | Performed by: INTERNAL MEDICINE

## 2022-01-08 PROCEDURE — 3044F HG A1C LEVEL LT 7.0%: CPT | Performed by: INTERNAL MEDICINE

## 2022-01-08 PROCEDURE — 80061 LIPID PANEL: CPT

## 2022-01-08 PROCEDURE — 82570 ASSAY OF URINE CREATININE: CPT

## 2022-01-08 PROCEDURE — 3060F POS MICROALBUMINURIA REV: CPT | Performed by: INTERNAL MEDICINE

## 2022-01-08 PROCEDURE — 36415 COLL VENOUS BLD VENIPUNCTURE: CPT

## 2022-01-08 PROCEDURE — 82043 UR ALBUMIN QUANTITATIVE: CPT

## 2022-01-08 PROCEDURE — 80053 COMPREHEN METABOLIC PANEL: CPT

## 2022-01-08 PROCEDURE — 83036 HEMOGLOBIN GLYCOSYLATED A1C: CPT

## 2022-01-11 ENCOUNTER — OFFICE VISIT (OUTPATIENT)
Dept: OTOLARYNGOLOGY | Facility: CLINIC | Age: 72
End: 2022-01-11
Payer: MEDICARE

## 2022-01-11 VITALS — BODY MASS INDEX: 26.4 KG/M2 | WEIGHT: 149 LBS | HEIGHT: 63 IN

## 2022-01-11 DIAGNOSIS — R42 VERTIGO: ICD-10-CM

## 2022-01-11 DIAGNOSIS — R05.9 COUGH: Primary | ICD-10-CM

## 2022-01-11 DIAGNOSIS — J34.3 NASAL TURBINATE HYPERTROPHY: ICD-10-CM

## 2022-01-11 PROCEDURE — 99213 OFFICE O/P EST LOW 20 MIN: CPT | Performed by: SPECIALIST

## 2022-01-11 PROCEDURE — 3008F BODY MASS INDEX DOCD: CPT | Performed by: SPECIALIST

## 2022-01-11 NOTE — PROGRESS NOTES
Yaniv Deutsch is a 80year old female. Patient presents with: Follow - Up: pt is here for follow up with vertigo, per pt vertigo has went away    HPI:   Patient has been taking the Singulair. Her cough is improved and her vertigo has resolved.   She ha [facility]   • Ductal carcinoma in situ of right breast 2010   • Endometrial thickening on ultra sound 8/11/2014   • Glaucoma, right eye    • Lumbar arthropathy 6/12/2018   • Osteoarthritis    • Other and unspecified hyperlipidemia    • Positive PP grossly intact. ASSESSMENT AND PLAN:   1. Cough  Improved but not completely resolved with the Singulair. This does make cough variant asthma a possibility. 2. Vertigo  Resolved. Await audiogram results.     3. Nasal turbinate hypertrophy  Improved

## 2022-01-11 NOTE — PATIENT INSTRUCTIONS
Continue the Singulair for the cough. Schedule the audiogram whenever you can to just rule out any inner ear difficulties. Call if the vertigo returns.

## 2022-01-18 ENCOUNTER — HOSPITAL ENCOUNTER (OUTPATIENT)
Dept: MRI IMAGING | Facility: HOSPITAL | Age: 72
Discharge: HOME | End: 2022-01-18
Attending: INTERNAL MEDICINE
Payer: MEDICARE

## 2022-01-18 DIAGNOSIS — K76.9 HEPATIC LESION: ICD-10-CM

## 2022-01-18 PROCEDURE — 74182 MRI ABDOMEN W/CONTRAST: CPT | Performed by: INTERNAL MEDICINE

## 2022-01-18 PROCEDURE — A9581 GADOXETATE DISODIUM INJ: HCPCS | Performed by: INTERNAL MEDICINE

## 2022-01-25 ENCOUNTER — TELEPHONE (OUTPATIENT)
Dept: INTERNAL MEDICINE CLINIC | Facility: CLINIC | Age: 72
End: 2022-01-25

## 2022-01-25 DIAGNOSIS — K86.2 PANCREATIC CYST: Primary | ICD-10-CM

## 2022-02-01 ENCOUNTER — OFFICE VISIT (OUTPATIENT)
Dept: ORTHOPEDICS CLINIC | Facility: CLINIC | Age: 72
End: 2022-02-01
Payer: MEDICARE

## 2022-02-01 VITALS — SYSTOLIC BLOOD PRESSURE: 142 MMHG | DIASTOLIC BLOOD PRESSURE: 76 MMHG | HEART RATE: 72 BPM

## 2022-02-01 DIAGNOSIS — M75.41 SUBACROMIAL IMPINGEMENT OF RIGHT SHOULDER: Primary | ICD-10-CM

## 2022-02-01 PROCEDURE — 20610 DRAIN/INJ JOINT/BURSA W/O US: CPT | Performed by: ORTHOPAEDIC SURGERY

## 2022-02-01 PROCEDURE — 3078F DIAST BP <80 MM HG: CPT | Performed by: ORTHOPAEDIC SURGERY

## 2022-02-01 PROCEDURE — 99204 OFFICE O/P NEW MOD 45 MIN: CPT | Performed by: ORTHOPAEDIC SURGERY

## 2022-02-01 PROCEDURE — 3077F SYST BP >= 140 MM HG: CPT | Performed by: ORTHOPAEDIC SURGERY

## 2022-02-01 RX ORDER — TRIAMCINOLONE ACETONIDE 40 MG/ML
40 INJECTION, SUSPENSION INTRA-ARTICULAR; INTRAMUSCULAR ONCE
Status: COMPLETED | OUTPATIENT
Start: 2022-02-01 | End: 2022-02-01

## 2022-02-01 NOTE — PROGRESS NOTES
Per verbal order from Dr. José Williamson, draw up 4ml of 1% lidocaine and 1ml of Kenalog 40 for cortisone injection to the right shoulder. Patient provided education handout for cortisone injection. Patient left office without obtaining post injection vitals.

## 2022-02-01 NOTE — PROCEDURES
Patient identified right shoulder as correct procedure site, this was verified with consent and 2 patient identifiers. A timeout was performed. Posterior inferior acromion skin was prepped with Betadine and alcohol. Injection site was anesthetized with ethyl chloride spray. 22-gauge needle was introduced into the subacromial space which was injected with 40 mg of Kenalog and 4 mL of 1% lidocaine. The patient tolerated the procedure well and a soft dressing was applied.

## 2022-02-09 ENCOUNTER — TELEPHONE (OUTPATIENT)
Dept: PHYSICAL THERAPY | Facility: HOSPITAL | Age: 72
End: 2022-02-09

## 2022-02-25 ENCOUNTER — TELEPHONE (OUTPATIENT)
Dept: PHYSICAL THERAPY | Facility: HOSPITAL | Age: 72
End: 2022-02-25

## 2022-02-28 RX ORDER — HYDROCHLOROTHIAZIDE 12.5 MG/1
12.5 TABLET ORAL DAILY
Qty: 90 TABLET | Refills: 1 | Status: SHIPPED | OUTPATIENT
Start: 2022-02-28 | End: 2022-08-31

## 2022-02-28 NOTE — TELEPHONE ENCOUNTER
Refill passed per [employer] protocol. Requested Prescriptions   Pending Prescriptions Disp Refills    HYDROCHLOROTHIAZIDE 12.5 MG Oral Tab [Pharmacy Med Name: HYDROCHLOROTHIAZIDE 12.5MG TABLETS] 90 tablet 1     Sig: TAKE 1 TABLET BY MOUTH DAILY        Hypertensive Medications Protocol Passed - 2022  2:47 PM        Passed - CMP or BMP in past 12 months        Passed - Appointment in past 6 or next 3 months        Passed - GFR Non- > 50     Lab Results   Component Value Date    GFRNAA 92 2022                      Recent Outpatient Visits              3 weeks ago Leg pain, bilateral    Vascular - [address], [place] Najjar, Samer F, MD    Office Visit    3 weeks ago Subacromial impingement of right shoulder    [employer] for Taya Barry MD    Office Visit    1 month ago Cough    [employer], Ivone Huerta MD    Office Visit    1 month ago Acute pain of right shoulder    Jase Sands MD    Office Visit    2 months ago Drea Robbins, Saul Snyder MD    Office Visit           Future Appointments         Provider Department Appt Notes    In 3 days YOLANDA Peguero in [facility]? ??  [employer]  c/p $20    In 1 week Hailey Peguero & [address] in [employer]  c/p $20    In 1 week Hailey Peguero & [address] in [employer]  c/p $20    In 2 weeks Cherelle Escalante, [facility], [facility] 3 week f/u *informed of policy and $01 copay    In 2 weeks Hailey Peguero & [address] in [employer]  c/p $20    In 2 weeks Hailey Peguero & [address] in [employer]  c/p $20    In 3 weeks Hailey Peguero & [address] in [employer]  c/p $20    In 3 weeks Omid Peguero  Services in [facility] Makayla Brice  c/p $20    In 2 months Melvin Radford MD Gastroenterology - [address], [address] pt, cysts in liver

## 2022-03-01 ENCOUNTER — APPOINTMENT (OUTPATIENT)
Dept: PHYSICAL THERAPY | Age: 72
End: 2022-03-01
Attending: ORTHOPAEDIC SURGERY
Payer: MEDICARE

## 2022-03-03 ENCOUNTER — OFFICE VISIT (OUTPATIENT)
Dept: PHYSICAL THERAPY | Age: 72
End: 2022-03-03
Attending: ORTHOPAEDIC SURGERY
Payer: MEDICARE

## 2022-03-03 DIAGNOSIS — M75.41 SUBACROMIAL IMPINGEMENT OF RIGHT SHOULDER: ICD-10-CM

## 2022-03-03 PROCEDURE — 97110 THERAPEUTIC EXERCISES: CPT

## 2022-03-03 PROCEDURE — 97161 PT EVAL LOW COMPLEX 20 MIN: CPT

## 2022-03-03 NOTE — PROGRESS NOTES
P.T. EVALUATION:   Referring Physician: Dr. Arvind Clark  Diagnosis: Subacromial impingement of right shoulder (M75.41)     Date of Onset: January 2022 Date of Service: 3/3/2022     PATIENT SUMMARY   Phil Colorado is a 80year old y/o female who presents to therapy today with complaints of right shoulder subacromial impingement  Pt describes pain level 2-3/10 currently; 7-8/10 initially  History of current condition: Patient reports pain began approximately two months ago. She reports after repeated right arm use at her job she began to have pain. Pt reports she received a cortisone injection which has helped a lot. Current functional limitations include upper body dressing, reaching overhead, lifting  Beatriz describes prior level of function independent. Pt goals include pain relief and return to prior level of function  Past medical history was reviewed with Beatriz. 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 She states she may be returning to home nursing in the near future. She enjoys bowling and recently started. She is right handed. ASSESSMENT:   Patient presents to PT clinic due to right shoulder subacromial impingement. Patient demos normal R shoulder ROM, decreased RUE strength, good posture, and pain. These impairments are functionally limiting pt's ability to reach, lift, and perform upper body dressing activities. Teagan Fountain would benefit from skilled Physical Therapy to address the above impairments to relieve pain and return to prior level of function.      Precautions:  None     OBJECTIVE:   Observation: pt ambulates into clinic independently    Sensation: normal BUE sensation     AROM:   Shoulder ROM (standing):  Flx: R 153 deg, L 155 deg  Abd: B 160 deg  ER/HBH: B WNL  IR/HBB: B T10    Strength/MMT:   Shoulder flx: R 4/5, L 5/5  Shoulder abd: R 5/5, L 5/5  Shoulder ER: R 5/5, L 5/5  Shoulder IR: R 5/5, L 5/5  Biceps: R 4/5, L 5/5    Special tests:  (-) painful arc    Posture: good sitting and standing posture    Gait: patient ambulates into clinic independently    [address] and Response:  Patient education provided on PT eval findings, treatment plan, goals, HEP  Patient received there ex, HEP, ice x 10 minutes to R shoulder at end of session in supine  Charges: PT Eval, TE 1   Total Timed Treatment: 30 min     Total Treatment Time: 40 min      PT Eval Low Complexity     PLAN OF CARE:    Goals:    1- Pt will be I with maintenance and progression of HEP  2- Pt will demo increase in RUE strength to 5/5 to ease ability for pt to lift objects  3- Pt will report abolishment of pain with reaching and upper body dressing    Frequency / Duration: Patient will be seen for 1x/week or a total of 4-6 visits over a 90 day period. Treatment will include: Modalities prn, manual therapy, therapeutic exercises, therapeutic activity, and instructions on a home program.     Education or treatment limitation: None  Rehab Potential:good    Patient was advised of these findings, precautions, and treatment options and has agreed to actively participate in planning and for this course of care. Thank you for your referral. Please co-sign or sign and return this letter via fax as soon as possible to [phone]. If you have any questions, please contact me at Dept: [phone]    Sincerely,  Electronically signed by therapist: Madyson Leone PT, DPT    [de-identified] certification required: Yes  I certify the need for these services furnished under this plan of treatment and while under my care.     X___________________________________________________ Date____________________    Certification From: 7/9/9475  To:6/1/2022

## 2022-03-08 ENCOUNTER — APPOINTMENT (OUTPATIENT)
Dept: PHYSICAL THERAPY | Age: 72
End: 2022-03-08
Attending: ORTHOPAEDIC SURGERY
Payer: MEDICARE

## 2022-03-10 ENCOUNTER — OFFICE VISIT (OUTPATIENT)
Dept: PHYSICAL THERAPY | Age: 72
End: 2022-03-10
Attending: ORTHOPAEDIC SURGERY
Payer: MEDICARE

## 2022-03-10 DIAGNOSIS — M75.41 SUBACROMIAL IMPINGEMENT OF RIGHT SHOULDER: ICD-10-CM

## 2022-03-10 PROCEDURE — 97110 THERAPEUTIC EXERCISES: CPT

## 2022-03-10 NOTE — PROGRESS NOTES
Diagnosis:  Subacromial impingement of right shoulder (M75.41)    Next MD visit: none scheduled  Fall Risk: standard         Precautions: n/a          Medication Changes since last visit?: No    Subjective: Pt reports 3/10 shoulder pain today. 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 Objective:     Date: 3/10/2022  Visit #: 2/6 [facility]) exp. 6/1/2022   HEP      Therapeutic Exercise   - supine B shoulder flexion with wand 2 x 10  - supine R/L shoulder chest press 2# 2 x 10 ea  - sidelying R shoulder abduction 2# 2 x 10 ea  - sidelying R shoulder ER 1# 2 x 10  - supine R shoulder flexion 1# 2 x 10  - supine R shoulder D2 flexion with RTB 1 x 10  - sidelying R shoulder ER with 1# 2 x 10  - sidelying R shoulder abduction to 90 deg 2# 2 x 10  - prone R rows with 2# 10x  - seated R bicep curl 3# 2 x 12  - standing B shoulder extension with wand 2 x 10  - standing B shoulder IR/HBB with wand 2 x 10  - standing B serratus punches with GSC 1 x 15  - standing B shoulder ER with RTB 10x  - standing B shoulder horizontal abd/add with YTB 1 x 10  - standing R shoulder ER with RTB with 2 x 10  - standing R shoulder IR with GTB 2 x 10  - standing B shoulder flexion AROM 10x   Manual Therapy   -    Therapeutic Activity   -    Modalities   -                Assessment: Session focused on global rotator cuff strengthening exercises.        Plan: continue PT      Charges: Ex 2       Total Timed Treatment: 36  min  Total Treatment Time: 36 min

## 2022-03-14 ENCOUNTER — OFFICE VISIT (OUTPATIENT)
Dept: SURGERY | Facility: CLINIC | Age: 72
End: 2022-03-14
Payer: MEDICARE

## 2022-03-14 DIAGNOSIS — N20.1 RIGHT URETERAL STONE: Primary | ICD-10-CM

## 2022-03-14 PROCEDURE — 99213 OFFICE O/P EST LOW 20 MIN: CPT | Performed by: UROLOGY

## 2022-03-15 ENCOUNTER — APPOINTMENT (OUTPATIENT)
Dept: PHYSICAL THERAPY | Age: 72
End: 2022-03-15
Attending: ORTHOPAEDIC SURGERY
Payer: MEDICARE

## 2022-03-16 ENCOUNTER — TELEPHONE (OUTPATIENT)
Dept: PHYSICAL THERAPY | Facility: HOSPITAL | Age: 72
End: 2022-03-16

## 2022-03-17 ENCOUNTER — APPOINTMENT (OUTPATIENT)
Dept: PHYSICAL THERAPY | Age: 72
End: 2022-03-17
Attending: ORTHOPAEDIC SURGERY
Payer: MEDICARE

## 2022-03-22 ENCOUNTER — APPOINTMENT (OUTPATIENT)
Dept: PHYSICAL THERAPY | Age: 72
End: 2022-03-22
Attending: ORTHOPAEDIC SURGERY
Payer: MEDICARE

## 2022-03-24 ENCOUNTER — OFFICE VISIT (OUTPATIENT)
Dept: PHYSICAL THERAPY | Age: 72
End: 2022-03-24
Attending: ORTHOPAEDIC SURGERY
Payer: MEDICARE

## 2022-03-24 DIAGNOSIS — M75.41 SUBACROMIAL IMPINGEMENT OF RIGHT SHOULDER: ICD-10-CM

## 2022-03-24 PROCEDURE — 97110 THERAPEUTIC EXERCISES: CPT

## 2022-03-24 NOTE — PROGRESS NOTES
Diagnosis:  Subacromial impingement of right shoulder (M75.41)    Next MD visit: none scheduled  Fall Risk: standard         Precautions: n/a          Medication Changes since last visit?: No    Subjective: Pt reports 4/10 right shoulder pain with movements and no shoulder pain at rest.       Objective:     Date: 3/24/2022  Visit #: 3/6 [facility]) exp. 6/1/2022 Date: 3/10/2022  Visit #: 2/6 [facility]) exp.  6/1/2022   HEP   - updated HEP - see pt instructions section    Therapeutic Exercise   - supine B shoulder flexion with wand 2 x 10  - supine B shoulder chest press 2# 2 x 10 ea  - sidelying R shoulder abduction 2# 2 x 10 ea  - sidelying R shoulder ER 1# 2 x 10  - supine R shoulder flexion 1# 2 x 10  - sidelying R shoulder ER with 1# 2 x 10  - sidelying R shoulder abduction to 90 deg 2# 2 x 10  - prone R rows with 2# 2 x 10  - seated R bicep curl 3# 2 x 12  - seated cervical extension with towel 2 x 10  - standing B shoulder extension with wand 2 x 10  - standing B shoulder IR/HBB with wand 2 x 10  - standing B serratus punches with GSC 1 x 15  - standing B shoulder ER with [address] 2 x 10  - standing B shoulder horizontal abd/add with YTB 1 x 10  - standing R shoulder ER with RTB with 2 x 10  - standing R shoulder ER walkouts with YTB 1 x 5   - standing R shoulder IR with GTB 2 x 10   - supine B shoulder flexion with wand 2 x 10  - supine R/L shoulder chest press 2# 2 x 10 ea  - sidelying R shoulder abduction 2# 2 x 10 ea  - sidelying R shoulder ER 1# 2 x 10  - supine R shoulder flexion 1# 2 x 10  - supine R shoulder D2 flexion with RTB 1 x 10  - sidelying R shoulder ER with 1# 2 x 10  - sidelying R shoulder abduction to 90 deg 2# 2 x 10  - prone R rows with 2# 10x  - seated R bicep curl 3# 2 x 12  - standing B shoulder extension with wand 2 x 10  - standing B shoulder IR/HBB with wand 2 x 10  - standing B serratus punches with GSC 1 x 15  - standing B shoulder ER with RTB 10x  - standing B shoulder horizontal abd/add with YTB 1 x 10  - standing R shoulder ER with RTB with 2 x 10  - standing R shoulder IR with GTB 2 x 10  - standing B shoulder flexion AROM 10x   Manual Therapy    -    Therapeutic Activity    -    Modalities    -               Assessment: Continued with shoulder and scapular stabilization interventions. Pt reporting no pain post interventions.       Plan: pt to continue with home program at this time    Charges: Ex 3       Total Timed Treatment: 40  min  Total Treatment Time: 40 min

## 2022-03-30 RX ORDER — AMLODIPINE BESYLATE 2.5 MG/1
2.5 TABLET ORAL DAILY
Qty: 90 TABLET | Refills: 1 | Status: SHIPPED | OUTPATIENT
Start: 2022-03-30 | End: 2022-08-19

## 2022-03-30 NOTE — TELEPHONE ENCOUNTER
Refill passed per [employer] protocol.     Requested Prescriptions   Pending Prescriptions Disp Refills    AMLODIPINE 2.5 MG Oral Tab [Pharmacy Med Name: AMLODIPINE BESYLATE 2.5MG TABLETS] 90 tablet 1     Sig: TAKE 1 TABLET(2.5 MG) BY MOUTH DAILY        Hypertensive Medications Protocol Passed - 3/30/2022  1:40 PM        Passed - CMP or BMP in past 12 months        Passed - Appointment in past 6 or next 3 months        Passed - GFR Non- > 50     Lab Results   Component Value Date    GFRNAA 92 01/08/2022                       Recent Outpatient Visits              6 days ago Subacromial impingement of right shoulder    [place]  [facility] in [facility], [place], PT    Office Visit    2 weeks ago Right ureteral stone    [facility], Patrice Brown MD    Office Visit    2 weeks ago Subacromial impingement of right shoulder    [place]  [facility] in [facility], [address] PT    Office Visit    3 weeks ago Subacromial impingement of right shoulder    [place]  [facility] in [facility], [place], PT    Office Visit    1 month ago Leg pain, bilateral    Vascular - [address] Polk MD    Office Visit            Future Appointments         Provider Department Appt Notes    In 2 weeks Ria Erazo MD [employer] [place], [facility], [address], [address]    In 1 month Karolina Nieto MD Gastroenterology - [address], [place] [address] pt, cysts in liver

## 2022-04-19 ENCOUNTER — OFFICE VISIT (OUTPATIENT)
Dept: INTERNAL MEDICINE CLINIC | Facility: CLINIC | Age: 72
End: 2022-04-19
Payer: MEDICARE

## 2022-04-19 VITALS
HEIGHT: 63 IN | RESPIRATION RATE: 16 BRPM | BODY MASS INDEX: 26.93 KG/M2 | DIASTOLIC BLOOD PRESSURE: 70 MMHG | HEART RATE: 92 BPM | SYSTOLIC BLOOD PRESSURE: 112 MMHG | TEMPERATURE: 98 F | OXYGEN SATURATION: 98 % | WEIGHT: 152 LBS

## 2022-04-19 DIAGNOSIS — E11.59 HYPERTENSION ASSOCIATED WITH TYPE 2 DIABETES MELLITUS (HCC): ICD-10-CM

## 2022-04-19 DIAGNOSIS — I70.0 ATHEROSCLEROSIS OF AORTA (HCC): ICD-10-CM

## 2022-04-19 DIAGNOSIS — J30.2 SEASONAL ALLERGIC RHINITIS, UNSPECIFIED TRIGGER: ICD-10-CM

## 2022-04-19 DIAGNOSIS — Z01.00 DIABETIC EYE EXAM (HCC): ICD-10-CM

## 2022-04-19 DIAGNOSIS — K86.2 PANCREATIC CYST: ICD-10-CM

## 2022-04-19 DIAGNOSIS — E11.69 HYPERLIPIDEMIA ASSOCIATED WITH TYPE 2 DIABETES MELLITUS (HCC): ICD-10-CM

## 2022-04-19 DIAGNOSIS — Z12.11 COLON CANCER SCREENING: ICD-10-CM

## 2022-04-19 DIAGNOSIS — I15.2 HYPERTENSION ASSOCIATED WITH TYPE 2 DIABETES MELLITUS (HCC): ICD-10-CM

## 2022-04-19 DIAGNOSIS — E11.9 DIABETIC EYE EXAM (HCC): ICD-10-CM

## 2022-04-19 DIAGNOSIS — E11.9 TYPE 2 DIABETES MELLITUS WITHOUT COMPLICATION, WITHOUT LONG-TERM CURRENT USE OF INSULIN (HCC): ICD-10-CM

## 2022-04-19 DIAGNOSIS — E78.5 HYPERLIPIDEMIA ASSOCIATED WITH TYPE 2 DIABETES MELLITUS (HCC): ICD-10-CM

## 2022-04-19 DIAGNOSIS — M85.89 OSTEOPENIA OF MULTIPLE SITES: ICD-10-CM

## 2022-04-19 DIAGNOSIS — Z12.31 ENCOUNTER FOR SCREENING MAMMOGRAM FOR BREAST CANCER: ICD-10-CM

## 2022-04-19 DIAGNOSIS — E53.8 VITAMIN B12 DEFICIENCY: ICD-10-CM

## 2022-04-19 DIAGNOSIS — Z00.00 MEDICARE ANNUAL WELLNESS VISIT, SUBSEQUENT: Primary | ICD-10-CM

## 2022-04-19 DIAGNOSIS — C50.911 HORMONE RECEPTOR POSITIVE BREAST CANCER, RIGHT (HCC): ICD-10-CM

## 2022-04-19 DIAGNOSIS — M85.80 OSTEOPENIA, UNSPECIFIED LOCATION: ICD-10-CM

## 2022-04-19 PROCEDURE — 3008F BODY MASS INDEX DOCD: CPT | Performed by: INTERNAL MEDICINE

## 2022-04-19 PROCEDURE — 99397 PER PM REEVAL EST PAT 65+ YR: CPT | Performed by: INTERNAL MEDICINE

## 2022-04-19 PROCEDURE — G0439 PPPS, SUBSEQ VISIT: HCPCS | Performed by: INTERNAL MEDICINE

## 2022-04-19 PROCEDURE — 3074F SYST BP LT 130 MM HG: CPT | Performed by: INTERNAL MEDICINE

## 2022-04-19 PROCEDURE — 3078F DIAST BP <80 MM HG: CPT | Performed by: INTERNAL MEDICINE

## 2022-04-19 PROCEDURE — 96160 PT-FOCUSED HLTH RISK ASSMT: CPT | Performed by: INTERNAL MEDICINE

## 2022-04-20 PROBLEM — R19.7 DIARRHEA: Status: RESOLVED | Noted: 2021-05-23 | Resolved: 2022-04-20

## 2022-05-09 RX ORDER — ATORVASTATIN CALCIUM 40 MG/1
TABLET, FILM COATED ORAL
Qty: 90 TABLET | Refills: 1 | Status: SHIPPED | OUTPATIENT
Start: 2022-05-09

## 2022-05-09 RX ORDER — LOSARTAN POTASSIUM 100 MG/1
TABLET ORAL
Qty: 90 TABLET | Refills: 1 | Status: SHIPPED | OUTPATIENT
Start: 2022-05-09

## 2022-05-20 DIAGNOSIS — M75.41 SUBACROMIAL IMPINGEMENT OF RIGHT SHOULDER: Primary | ICD-10-CM

## 2022-05-20 RX ORDER — DIPHENHYDRAMINE HCL 25 MG
TABLET ORAL
Refills: 0 | Status: CANCELLED | OUTPATIENT
Start: 2022-05-20

## 2022-05-20 RX ORDER — DIPHENHYDRAMINE HCL 25 MG
TABLET ORAL
Qty: 1 KIT | Refills: 0 | Status: SHIPPED | OUTPATIENT
Start: 2022-05-20

## 2022-05-20 NOTE — TELEPHONE ENCOUNTER
Refill passed per [facility], [facility] protocol.   Requested Prescriptions   Pending Prescriptions Disp Refills    TRUE METRIX AIR GLUCOSE METER w/Device Does not apply Kit [Pharmacy Med Name: TRUE METRIX AIR KIT]  0     Sig: USE DAILY TO TEST BLOOD GLUCOSE AS DIRECTED        Diabetic Supplies Protocol Passed - 5/20/2022  4:22 PM        Passed - Appointment in past 12 or next 3 months            Recent Outpatient Visits              1 month ago Medicare annual wellness visit, subsequent    [facility], [facility], [address], Harpal Reich MD    Office Visit    1 month ago Subacromial impingement of right shoulder    [place]  [facility] in [place] [address] PT    Office Visit    2 months ago Right ureteral stone    [facility] [address] [place] [address] Road, Ector Ly MD    Office Visit    2 months ago Subacromial impingement of right shoulder    [place]  [facility] in [address] PT    Office Visit    2 months ago Subacromial impingement of right shoulder    [place]  [facility] in [address] PT    Office Visit

## 2022-05-20 NOTE — TELEPHONE ENCOUNTER
Refill passed per [facility] protocol.   Requested Prescriptions   Pending Prescriptions Disp Refills    Blood Glucose Monitoring Suppl (TRUE METRIX AIR GLUCOSE METER) w/Device Does not apply Kit [Pharmacy Med Name: TRUE METRIX AIR KIT] 1 kit 0        Diabetic Supplies Protocol Passed - 5/20/2022  4:22 PM        Passed - Appointment in past 12 or next 3 months            Recent Outpatient Visits              1 month ago Medicare annual wellness visit, subsequent    [facility], [address], Narciso Ann MD    Office Visit    1 month ago Subacromial impingement of right shoulder    [place]  [facility] in [facility], [address] PT    Office Visit    2 months ago Right ureteral stone    [facility], [address], Teressa Rojo MD    Office Visit    2 months ago Subacromial impingement of right shoulder    [place]  [facility] in [facility], [address] PT    Office Visit    2 months ago Subacromial impingement of right shoulder    [place]  [facility]    Office Visit

## 2022-06-24 NOTE — TELEPHONE ENCOUNTER
Patient is calling to request Meloxicam for her shoulder pain and she is also asking for a referral for Ortho, Dr. Nicolette Lowry. Per patient she used Meloxicam before and it really helped with rotater cuff pain.      See pended orders

## 2022-06-26 RX ORDER — MELOXICAM 7.5 MG/1
7.5 TABLET ORAL
Qty: 60 TABLET | Refills: 0 | Status: SHIPPED | OUTPATIENT
Start: 2022-06-26

## 2022-06-27 ENCOUNTER — TELEPHONE (OUTPATIENT)
Dept: INTERNAL MEDICINE CLINIC | Facility: CLINIC | Age: 72
End: 2022-06-27

## 2022-06-27 DIAGNOSIS — M25.511 ACUTE PAIN OF RIGHT SHOULDER: Primary | ICD-10-CM

## 2022-06-27 NOTE — TELEPHONE ENCOUNTER
Patient is requesting an order for xray of right shoulder. Per patient she is doing the exercises but it is not working. Please advise.

## 2022-06-29 ENCOUNTER — PATIENT MESSAGE (OUTPATIENT)
Dept: OTHER | Age: 72
End: 2022-06-29

## 2022-06-30 NOTE — TELEPHONE ENCOUNTER
Noted    From: [employer]  Sent: 6/29/2022  4:07 PM CDT  To: Em Rn Triage  Subject: medication    Thank you!

## 2022-07-09 ENCOUNTER — HOSPITAL ENCOUNTER (OUTPATIENT)
Dept: BONE DENSITY | Age: 72
Discharge: HOME | End: 2022-07-09
Attending: INTERNAL MEDICINE
Payer: MEDICARE

## 2022-07-09 ENCOUNTER — HOSPITAL ENCOUNTER (OUTPATIENT)
Dept: GENERAL RADIOLOGY | Age: 72
Discharge: HOME | End: 2022-07-09
Attending: INTERNAL MEDICINE
Payer: MEDICARE

## 2022-07-09 ENCOUNTER — PATIENT MESSAGE (OUTPATIENT)
Dept: INTERNAL MEDICINE CLINIC | Facility: CLINIC | Age: 72
End: 2022-07-09

## 2022-07-09 DIAGNOSIS — M25.511 ACUTE PAIN OF RIGHT SHOULDER: ICD-10-CM

## 2022-07-09 DIAGNOSIS — M85.89 OSTEOPENIA OF MULTIPLE SITES: ICD-10-CM

## 2022-07-09 PROCEDURE — 77080 DXA BONE DENSITY AXIAL: CPT | Performed by: INTERNAL MEDICINE

## 2022-07-09 PROCEDURE — 73030 X-RAY EXAM OF SHOULDER: CPT | Performed by: INTERNAL MEDICINE

## 2022-07-09 NOTE — TELEPHONE ENCOUNTER
From: [employer]  To: Jesus Gallegos MD  Sent: 7/9/2022 11:19 AM CDT  Subject: Question regarding X-Ray Shoulder    Is this serious? What treatment or medications do I need?

## 2022-08-15 ENCOUNTER — OFFICE VISIT (OUTPATIENT)
Dept: ORTHOPEDICS CLINIC | Facility: CLINIC | Age: 72
End: 2022-08-15
Payer: MEDICARE

## 2022-08-15 VITALS — DIASTOLIC BLOOD PRESSURE: 69 MMHG | HEART RATE: 75 BPM | SYSTOLIC BLOOD PRESSURE: 123 MMHG

## 2022-08-15 DIAGNOSIS — M75.81 TENDINITIS OF RIGHT ROTATOR CUFF: ICD-10-CM

## 2022-08-15 DIAGNOSIS — M75.41 SUBACROMIAL IMPINGEMENT OF RIGHT SHOULDER: Primary | ICD-10-CM

## 2022-08-15 RX ORDER — TRIAMCINOLONE ACETONIDE 40 MG/ML
40 INJECTION, SUSPENSION INTRA-ARTICULAR; INTRAMUSCULAR ONCE
Status: COMPLETED | OUTPATIENT
Start: 2022-08-15 | End: 2022-08-15

## 2022-08-15 RX ADMIN — TRIAMCINOLONE ACETONIDE 40 MG: 40 INJECTION, SUSPENSION INTRA-ARTICULAR; INTRAMUSCULAR at 12:02:00

## 2022-08-15 NOTE — PROGRESS NOTES
Per verbal order from Dr. Jessica Pruett, draw up 5ml of 0.5% Marcaine and 1ml of Kenalog 40 for cortisone injection to right shoulder [facility]  Patient provided education handout for cortisone injection. Patient left office prior to obtaining post injection vitals.

## 2022-08-19 RX ORDER — AMLODIPINE BESYLATE 2.5 MG/1
2.5 TABLET ORAL DAILY
Qty: 90 TABLET | Refills: 1 | Status: SHIPPED | OUTPATIENT
Start: 2022-08-19 | End: 2022-08-19

## 2022-08-19 RX ORDER — AMLODIPINE BESYLATE 10 MG/1
10 TABLET ORAL DAILY
Qty: 15 TABLET | Refills: 0 | Status: CANCELLED | OUTPATIENT
Start: 2022-08-19

## 2022-08-19 NOTE — TELEPHONE ENCOUNTER
Per patient, she is no longer taking 2.5mg, she is now taking 10mg of her amLODIPine , so the one that was sent is not enough. Please advise.

## 2022-08-19 NOTE — TELEPHONE ENCOUNTER
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 She is doing amlodipine 10 mg daily and would like to know if she can have a prescription for 10 mg until her appointment with you on 8/31/22.      Medication pended for your review and approval.      Future Appointments   Date Time Provider Zachariah Leti   8/31/2022 11:30 AM MD MELODY Bartholomew

## 2022-08-20 NOTE — TELEPHONE ENCOUNTER
Patient read MyChart 8/19/22    From   Jony Sandhu RN To   Co, Sagar Ruiz and Delivered   8/19/2022 10:50 PM   Last Read in MyChart   8/19/2022 10:52 PM by Julio Cesar Rosario

## 2022-08-20 NOTE — TELEPHONE ENCOUNTER
I just approved refill for amlodipine 2.5mg tabs so I dont think insurance will approve the 10mg if she had picked up already th 2.5mg tab refill.

## 2022-08-31 ENCOUNTER — OFFICE VISIT (OUTPATIENT)
Dept: INTERNAL MEDICINE CLINIC | Facility: CLINIC | Age: 72
End: 2022-08-31
Payer: MEDICARE

## 2022-08-31 VITALS
WEIGHT: 148.19 LBS | OXYGEN SATURATION: 97 % | BODY MASS INDEX: 26.26 KG/M2 | HEART RATE: 84 BPM | HEIGHT: 63 IN | TEMPERATURE: 99 F | SYSTOLIC BLOOD PRESSURE: 121 MMHG | DIASTOLIC BLOOD PRESSURE: 63 MMHG

## 2022-08-31 DIAGNOSIS — E11.59 HYPERTENSION ASSOCIATED WITH TYPE 2 DIABETES MELLITUS (HCC): Primary | ICD-10-CM

## 2022-08-31 DIAGNOSIS — I15.2 HYPERTENSION ASSOCIATED WITH TYPE 2 DIABETES MELLITUS (HCC): Primary | ICD-10-CM

## 2022-08-31 PROCEDURE — 3078F DIAST BP <80 MM HG: CPT | Performed by: INTERNAL MEDICINE

## 2022-08-31 PROCEDURE — 3074F SYST BP LT 130 MM HG: CPT | Performed by: INTERNAL MEDICINE

## 2022-08-31 PROCEDURE — 3008F BODY MASS INDEX DOCD: CPT | Performed by: INTERNAL MEDICINE

## 2022-08-31 PROCEDURE — 1126F AMNT PAIN NOTED NONE PRSNT: CPT | Performed by: INTERNAL MEDICINE

## 2022-08-31 PROCEDURE — 99214 OFFICE O/P EST MOD 30 MIN: CPT | Performed by: INTERNAL MEDICINE

## 2022-08-31 RX ORDER — AMLODIPINE BESYLATE 2.5 MG/1
2.5 TABLET ORAL DAILY
COMMUNITY
End: 2022-08-31

## 2022-08-31 RX ORDER — AMLODIPINE BESYLATE 2.5 MG/1
2.5 TABLET ORAL DAILY
Qty: 90 TABLET | Refills: 1 | Status: SHIPPED | OUTPATIENT
Start: 2022-08-31

## 2022-08-31 RX ORDER — HYDROCHLOROTHIAZIDE 12.5 MG/1
12.5 TABLET ORAL
Qty: 180 TABLET | Refills: 0 | Status: SHIPPED | OUTPATIENT
Start: 2022-08-31

## 2022-09-02 ENCOUNTER — MED REC SCAN ONLY (OUTPATIENT)
Dept: INTERNAL MEDICINE CLINIC | Facility: CLINIC | Age: 72
End: 2022-09-02

## 2022-10-11 ENCOUNTER — TELEPHONE (OUTPATIENT)
Dept: ORTHOPEDICS CLINIC | Facility: CLINIC | Age: 72
End: 2022-10-11

## 2022-10-11 DIAGNOSIS — M75.81 TENDINITIS OF RIGHT ROTATOR CUFF: Primary | ICD-10-CM

## 2022-10-11 DIAGNOSIS — M75.41 SUBACROMIAL IMPINGEMENT OF RIGHT SHOULDER: ICD-10-CM

## 2022-10-11 NOTE — TELEPHONE ENCOUNTER
Patient states right shoulder is hurting again. Last visit note with you states will order MRI if pain persists patient is requesting MRI order. Please advise.

## 2022-10-12 NOTE — TELEPHONE ENCOUNTER
Called pt and informed her per Dr Vicky Borrego orders and gave her phone # to CS. Advised her once she schedules MRI to CB and schedule f/u appt to discuss results.

## 2022-10-15 ENCOUNTER — LAB ENCOUNTER (OUTPATIENT)
Dept: LAB | Age: 72
End: 2022-10-15
Attending: INTERNAL MEDICINE
Payer: MEDICARE

## 2022-10-15 DIAGNOSIS — E78.5 HYPERLIPIDEMIA ASSOCIATED WITH TYPE 2 DIABETES MELLITUS (HCC): ICD-10-CM

## 2022-10-15 DIAGNOSIS — E11.69 HYPERLIPIDEMIA ASSOCIATED WITH TYPE 2 DIABETES MELLITUS (HCC): ICD-10-CM

## 2022-10-15 DIAGNOSIS — E11.9 TYPE 2 DIABETES MELLITUS WITHOUT COMPLICATION, WITHOUT LONG-TERM CURRENT USE OF INSULIN (HCC): ICD-10-CM

## 2022-10-15 LAB
ALBUMIN SERPL-MCNC: 4.1 G/DL (ref 3.4–5)
ALBUMIN/GLOB SERPL: 1.4 {RATIO} (ref 1–2)
ALP LIVER SERPL-CCNC: 71 U/L
ALT SERPL-CCNC: 26 U/L
ANION GAP SERPL CALC-SCNC: 4 MMOL/L (ref 0–18)
AST SERPL-CCNC: 20 U/L (ref 15–37)
BILIRUB SERPL-MCNC: 0.6 MG/DL (ref 0.1–2)
BUN BLD-MCNC: 13 MG/DL (ref 7–18)
BUN/CREAT SERPL: 21.7 (ref 10–20)
CALCIUM BLD-MCNC: 9.1 MG/DL (ref 8.5–10.1)
CHLORIDE SERPL-SCNC: 106 MMOL/L (ref 98–112)
CHOLEST SERPL-MCNC: 113 MG/DL (ref ?–200)
CO2 SERPL-SCNC: 30 MMOL/L (ref 21–32)
CREAT BLD-MCNC: 0.6 MG/DL
CREAT UR-SCNC: <13 MG/DL
EST. AVERAGE GLUCOSE BLD GHB EST-MCNC: 140 MG/DL (ref 68–126)
FASTING PATIENT LIPID ANSWER: YES
FASTING STATUS PATIENT QL REPORTED: YES
GFR SERPLBLD BASED ON 1.73 SQ M-ARVRAT: 96 ML/MIN/1.73M2 (ref 60–?)
GLOBULIN PLAS-MCNC: 2.9 G/DL (ref 2.8–4.4)
GLUCOSE BLD-MCNC: 96 MG/DL (ref 70–99)
HBA1C MFR BLD: 6.5 % (ref ?–5.7)
HDLC SERPL-MCNC: 53 MG/DL (ref 40–59)
LDLC SERPL CALC-MCNC: 41 MG/DL (ref ?–100)
MICROALBUMIN UR-MCNC: <0.5 MG/DL
NONHDLC SERPL-MCNC: 60 MG/DL (ref ?–130)
OSMOLALITY SERPL CALC.SUM OF ELEC: 290 MOSM/KG (ref 275–295)
POTASSIUM SERPL-SCNC: 3.7 MMOL/L (ref 3.5–5.1)
PROT SERPL-MCNC: 7 G/DL (ref 6.4–8.2)
SODIUM SERPL-SCNC: 140 MMOL/L (ref 136–145)
TRIGL SERPL-MCNC: 100 MG/DL (ref 30–149)
VLDLC SERPL CALC-MCNC: 14 MG/DL (ref 0–30)

## 2022-10-15 PROCEDURE — 83036 HEMOGLOBIN GLYCOSYLATED A1C: CPT

## 2022-10-15 PROCEDURE — 36415 COLL VENOUS BLD VENIPUNCTURE: CPT

## 2022-10-15 PROCEDURE — 82570 ASSAY OF URINE CREATININE: CPT

## 2022-10-15 PROCEDURE — 3044F HG A1C LEVEL LT 7.0%: CPT | Performed by: INTERNAL MEDICINE

## 2022-10-15 PROCEDURE — 80061 LIPID PANEL: CPT

## 2022-10-15 PROCEDURE — 3061F NEG MICROALBUMINURIA REV: CPT | Performed by: INTERNAL MEDICINE

## 2022-10-15 PROCEDURE — 82043 UR ALBUMIN QUANTITATIVE: CPT

## 2022-10-15 PROCEDURE — 80053 COMPREHEN METABOLIC PANEL: CPT

## 2022-10-19 ENCOUNTER — HOSPITAL ENCOUNTER (OUTPATIENT)
Dept: MRI IMAGING | Age: 72
Discharge: HOME | End: 2022-10-19
Attending: ORTHOPAEDIC SURGERY
Payer: MEDICARE

## 2022-10-19 DIAGNOSIS — M75.81 TENDINITIS OF RIGHT ROTATOR CUFF: ICD-10-CM

## 2022-10-19 DIAGNOSIS — M75.41 SUBACROMIAL IMPINGEMENT OF RIGHT SHOULDER: ICD-10-CM

## 2022-10-19 PROCEDURE — 73221 MRI JOINT UPR EXTREM W/O DYE: CPT | Performed by: ORTHOPAEDIC SURGERY

## 2022-10-21 ENCOUNTER — TELEPHONE (OUTPATIENT)
Dept: INTERNAL MEDICINE CLINIC | Facility: CLINIC | Age: 72
End: 2022-10-21

## 2022-10-21 NOTE — TELEPHONE ENCOUNTER
Patient called and is asking for a refill on losartan 50 mg BID. She has losartan 100 mg and is splitting it in half and doesn't always cut it equally     8/31/22: Hypertension associated with type 2 diabetes mellitus ([facility]  (primary encounter diagnosis)  Plan: she is currently taking losartan 50mg bid and hydrochlorothiazide 12.5mg bid which had controlled her bp well for the past week. I told her will continue same bp med regimen. She had asked to refill her amlodipine 2.5mg to be taken daily prn only if bp is elevated inspite of taking her above bp meds.

## 2022-10-22 RX ORDER — LOSARTAN POTASSIUM 50 MG/1
50 TABLET ORAL 2 TIMES DAILY
Qty: 180 TABLET | Refills: 1 | Status: SHIPPED | OUTPATIENT
Start: 2022-10-22

## 2022-11-04 ENCOUNTER — NURSE TRIAGE (OUTPATIENT)
Dept: INTERNAL MEDICINE CLINIC | Facility: CLINIC | Age: 72
End: 2022-11-04

## 2022-11-05 NOTE — TELEPHONE ENCOUNTER
Her osteoporosis as seen in her dexa scan was on her hip and not her spine; she needs to see me for her back pain since not from osteoporosis.    She already got cortisone shot from ortho for her shoulder pain 2 mos ago, would need to ffup with ortho for the shoulder pain

## 2022-11-07 NOTE — TELEPHONE ENCOUNTER
Spoke with patient,  verified. Notified her of Dr Alysa Fairchild instructions.   Scheduled follow up appointment:  Future Appointments   Date Time Provider Zachariah Riverai   2022  9:30 AM MD MELODY Cameron

## 2022-11-11 DIAGNOSIS — M75.41 SUBACROMIAL IMPINGEMENT OF RIGHT SHOULDER: ICD-10-CM

## 2022-11-11 RX ORDER — MELOXICAM 7.5 MG/1
7.5 TABLET ORAL
Qty: 90 TABLET | Refills: 1 | Status: SHIPPED | OUTPATIENT
Start: 2022-11-11

## 2022-11-11 NOTE — TELEPHONE ENCOUNTER
Refill passed per [place] [employer] [place], [facility] protocol. Requested Prescriptions   Pending Prescriptions Disp Refills    Meloxicam 7.5 MG Oral Tab 60 tablet 0     Sig: Take 1 tablet (7.5 mg total) by mouth daily as needed for Pain.        Non-Narcotic Pain Medication Protocol Passed - 11/11/2022  3:58 PM        Passed - In person appointment or virtual visit in the past 6 mos or appointment in next 3 mos     Recent Outpatient Visits              2 months ago Hypertension associated with type 2 diabetes mellitus [facility])    [facility], Cony Duckworth MD    Office Visit    2 months ago Subacromial impingement of right shoulder    [facility] Pepe Barraza MD    Office Visit    6 months ago Yaima [place] annual wellness visit, subsequent    [facility], Cony Duckworth MD    Office Visit    7 months ago Subacromial impingement of right shoulder    [place]  [facility] in [address] PT    Office Visit    8 months ago Right ureteral stone    [facility], [address], GdyniauLis MD    Office Visit          Future Appointments         Provider Department Appt Notes    In 6 days Toan Multani MD [facility], [facility], [address], [address] [facility]     In 3 weeks [facility], [address] [place] [address], [address], [address] MRI f/u                  Recent Outpatient Visits              2 months ago Hypertension associated with type 2 diabetes mellitus [facility])    [facility], [facility], Cony Duckworth MD    Office Visit    2 months ago Subacromial impingement of right shoulder    [facility] Pepe Barraza MD    Office Visit    6 months ago Yaima [place] annual wellness visit, subsequent    [place] Cony Rae MD    Office Visit    7 months ago Subacromial impingement of right shoulder    [place]  [facility] in [place], PT    Office Visit    8 months ago Right ureteral stone    [facility], [place], Ector Scale, [place]    Office Visit          Future Appointments         Provider Department Appt Notes    In 6 days Mirian Dodson MD [address] [place], [address], [place]SE [place]     In 3 weeks Farzaneh Diaz MD [facility], [place], [place] MRI f/u

## 2022-11-11 NOTE — TELEPHONE ENCOUNTER
Patient is out of medication.  Please advise Pt interrupted group at the end of group by yelling into group that he needed to make a phone call and wasn't group supposed to end at 9 pm. It would be beneficial to have staff monitor this behavior.Pt did not attend group.

## 2022-11-15 ENCOUNTER — MED REC SCAN ONLY (OUTPATIENT)
Dept: INTERNAL MEDICINE CLINIC | Facility: CLINIC | Age: 72
End: 2022-11-15

## 2022-11-16 RX ORDER — ATORVASTATIN CALCIUM 40 MG/1
40 TABLET, FILM COATED ORAL NIGHTLY
Qty: 90 TABLET | Refills: 1 | Status: SHIPPED | OUTPATIENT
Start: 2022-11-16

## 2022-11-16 NOTE — TELEPHONE ENCOUNTER
Refill passed per [address] [place] protocol. Requested Prescriptions   Pending Prescriptions Disp Refills    atorvastatin 40 MG Oral Tab 90 tablet 1     Sig: Take 1 tablet (40 mg total) by mouth nightly.        Cholesterol Medication Protocol Passed - 11/16/2022 12:36 PM        Passed - ALT in past 12 months        Passed - LDL in past 12 months        Passed - Last ALT < 80     Lab Results   Component Value Date    ALT 26 10/15/2022             Passed - Last LDL < 130     Lab Results   Component Value Date    LDL 41 10/15/2022             Passed - In person appointment or virtual visit in the past 12 mos or appointment in next 3 mos     Recent Outpatient Visits              2 months ago Hypertension associated with type 2 diabetes mellitus [facility])    [address], Breann De La Cruz MD    Office Visit    3 months ago Subacromial impingement of right shoulder    [facility] [place]y Thomas MD    Office Visit    7 months ago Yaima [place] annual wellness visit, subsequent    [address], Breann De La Cruz MD    Office Visit    7 months ago Subacromial impingement of right shoulder    [place]  [facility] in [facility] [address] PT    Office Visit    8 months ago Right ureteral stone    [facility], [address], Grecia Rodriguez MD    Office Visit          Future Appointments         Provider Department Appt Notes    Tomorrow Brandan Prabhakar MD [address] [place] [address], Saul [facility]  (policy informed)    In 2 weeks Amelia Daly MD [facility], [address], [place] MRI f/u

## 2022-11-17 ENCOUNTER — LAB ENCOUNTER (OUTPATIENT)
Dept: LAB | Age: 72
End: 2022-11-17
Attending: INTERNAL MEDICINE
Payer: MEDICARE

## 2022-11-17 ENCOUNTER — OFFICE VISIT (OUTPATIENT)
Dept: INTERNAL MEDICINE CLINIC | Facility: CLINIC | Age: 72
End: 2022-11-17
Payer: MEDICARE

## 2022-11-17 VITALS
OXYGEN SATURATION: 96 % | WEIGHT: 151.63 LBS | HEIGHT: 63 IN | BODY MASS INDEX: 26.87 KG/M2 | HEART RATE: 98 BPM | DIASTOLIC BLOOD PRESSURE: 68 MMHG | SYSTOLIC BLOOD PRESSURE: 117 MMHG | TEMPERATURE: 99 F

## 2022-11-17 DIAGNOSIS — M81.0 AGE-RELATED OSTEOPOROSIS WITHOUT CURRENT PATHOLOGICAL FRACTURE: Primary | ICD-10-CM

## 2022-11-17 DIAGNOSIS — M81.0 AGE-RELATED OSTEOPOROSIS WITHOUT CURRENT PATHOLOGICAL FRACTURE: ICD-10-CM

## 2022-11-17 LAB — VIT D+METAB SERPL-MCNC: 23.8 NG/ML (ref 30–100)

## 2022-11-17 PROCEDURE — G0008 ADMIN INFLUENZA VIRUS VAC: HCPCS | Performed by: INTERNAL MEDICINE

## 2022-11-17 PROCEDURE — 99214 OFFICE O/P EST MOD 30 MIN: CPT | Performed by: INTERNAL MEDICINE

## 2022-11-17 PROCEDURE — 1125F AMNT PAIN NOTED PAIN PRSNT: CPT | Performed by: INTERNAL MEDICINE

## 2022-11-17 PROCEDURE — 3008F BODY MASS INDEX DOCD: CPT | Performed by: INTERNAL MEDICINE

## 2022-11-17 PROCEDURE — 36415 COLL VENOUS BLD VENIPUNCTURE: CPT

## 2022-11-17 PROCEDURE — 90662 IIV NO PRSV INCREASED AG IM: CPT | Performed by: INTERNAL MEDICINE

## 2022-11-17 PROCEDURE — 3074F SYST BP LT 130 MM HG: CPT | Performed by: INTERNAL MEDICINE

## 2022-11-17 PROCEDURE — 82306 VITAMIN D 25 HYDROXY: CPT

## 2022-11-17 PROCEDURE — 3078F DIAST BP <80 MM HG: CPT | Performed by: INTERNAL MEDICINE

## 2022-11-18 RX ORDER — HYDROCHLOROTHIAZIDE 12.5 MG/1
12.5 TABLET ORAL
Qty: 180 TABLET | Refills: 0 | Status: SHIPPED | OUTPATIENT
Start: 2022-11-18

## 2022-11-18 NOTE — TELEPHONE ENCOUNTER
Refill passed per [address] Elizabeth Martinez protocol. Requested Prescriptions   Pending Prescriptions Disp Refills    hydroCHLOROthiazide 12.5 MG Oral Tab 180 tablet 0     Sig: Take 1 tablet (12.5 mg total) by mouth BID (Diuretic).        Hypertensive Medications Protocol Passed - 2022 11:18 AM        Passed - In person appointment in the past 12 or next 3 months     Recent Outpatient Visits              Yesterday Age-related osteoporosis without current pathological fracture    [address] Jase Olmos MD    Office Visit    2 months ago Hypertension associated with type 2 diabetes mellitus [facility])    [address], Jase Silva MD    Office Visit    3 months ago Subacromial impingement of right shoulder    [facility] [place] [facility] for Ayakarisa Martinez MD    Office Visit    7 months ago [employer] visit, subsequent    [address], Jase Silva MD    Office Visit    7 months ago Subacromial impingement of right shoulder    [address] in [facility] [address] PT    Office Visit          Future Appointments         Provider Department Appt Notes    In 2 weeks Mitra Gaming MD [facility] [place] [facility], [address], [place] MRI f/u               Passed - Last BP reading less than 140/90     BP Readings from Last 1 Encounters:  22 : 117/68              Passed - CMP or BMP in past 6 months     Recent Results (from the past 4392 hour(s))   COMP METABOLIC PANEL (14)    Collection Time: 10/15/22  9:29 AM   Result Value Ref Range    Glucose 96 70 - 99 mg/dL    Sodium 140 136 - 145 mmol/L    Potassium 3.7 3.5 - 5.1 mmol/L    Chloride 106 98 - 112 mmol/L    CO2 30.0 21.0 - 32.0 mmol/L    Anion Gap 4 0 - 18 mmol/L    BUN 13 7 - 18 mg/dL    Creatinine 0.60 0.55 - 1.02 mg/dL    BUN/CREA Ratio 21.7 (H) 10.0 - 20.0    Calcium, Total 9.1 8.5 - 10.1 mg/dL Calculated Osmolality 290 275 - 295 mOsm/kg    eGFR-Cr 96 >=60 mL/min/1.73m2    ALT 26 13 - 56 U/L    AST 20 15 - 37 U/L    Alkaline Phosphatase 71 55 - 142 U/L    Bilirubin, Total 0.6 0.1 - 2.0 mg/dL    Total Protein 7.0 6.4 - 8.2 g/dL    Albumin 4.1 3.4 - 5.0 g/dL    Globulin  2.9 2.8 - 4.4 g/dL    A/G Ratio 1.4 1.0 - 2.0    Patient Fasting for CMP? Yes      *Note: Due to a large number of results and/or encounters for the requested time period, some results have not been displayed. A complete set of results can be found in Results Review.                Passed - In person appointment or virtual visit in the past 6 months     Recent Outpatient Visits              Yesterday Age-related osteoporosis without current pathological fracture    [address] Row[address] Olmos MD    Office Visit    2 months ago Hypertension associated with type 2 diabetes mellitus [facility])    [employer], Faustino Nielson, Rowdy Swartz MD    Office Visit    3 months ago Subacromial impingement of right shoulder    [facility] [facility] for Makeda Pradhan MD    Office Visit    7 months ago Yaima Stevenson annual wellness visit, subsequent    [employer], Rowdy Duckworth MD    Office Visit    7 months ago Subacromial impingement of right shoulder    [address] in [place], [address] PT    Office Visit          Future Appointments         Provider Department Appt Notes    In 2 weeks Carloz Alonzo MD [facility] [place], [place], [place] MRI f/u               Passed - [facility] or GFRNAA > 50     GFR Evaluation  EGFRCR: 96 , resulted on 10/15/2022             Recent Outpatient Visits              Yesterday Age-related osteoporosis without current pathological fracture    [address] Row[address] Olmos MD    Office Visit    2 months ago Hypertension associated with type 2 diabetes mellitus [facility])    [place] [employer] [place], [facility], [address], Saul Syed MD    Office Visit    3 months ago Subacromial impingement of right shoulder    [facility] for Makedaloly Pradhan MD    Office Visit    7 months ago Yaima Stevenson annual wellness visit, subsequent    [facility], [facility], [address], Rowdy Swartz MD    Office Visit    7 months ago Subacromial impingement of right shoulder    [facility] [employer] in [place], [place] [address] PT    Office Visit          Future Appointments         Provider Department Appt Notes    In 2 weeks Carloz Alonzo MD [facility], [address], [place] MRI f/u

## 2022-12-06 ENCOUNTER — OFFICE VISIT (OUTPATIENT)
Dept: ORTHOPEDICS CLINIC | Facility: CLINIC | Age: 72
End: 2022-12-06
Payer: MEDICARE

## 2022-12-06 VITALS — DIASTOLIC BLOOD PRESSURE: 71 MMHG | HEART RATE: 81 BPM | SYSTOLIC BLOOD PRESSURE: 123 MMHG

## 2022-12-06 DIAGNOSIS — M75.41 SUBACROMIAL IMPINGEMENT OF RIGHT SHOULDER: ICD-10-CM

## 2022-12-06 DIAGNOSIS — M75.21 BICEPS TENDINITIS OF RIGHT SHOULDER: ICD-10-CM

## 2022-12-06 DIAGNOSIS — S43.431D SUPERIOR GLENOID LABRUM LESION OF RIGHT SHOULDER, SUBSEQUENT ENCOUNTER: ICD-10-CM

## 2022-12-06 DIAGNOSIS — M75.111 NONTRAUMATIC INCOMPLETE TEAR OF RIGHT ROTATOR CUFF: Primary | ICD-10-CM

## 2022-12-06 DIAGNOSIS — M75.81 TENDINITIS OF RIGHT ROTATOR CUFF: ICD-10-CM

## 2022-12-06 PROCEDURE — 20610 DRAIN/INJ JOINT/BURSA W/O US: CPT | Performed by: ORTHOPAEDIC SURGERY

## 2022-12-06 PROCEDURE — 1125F AMNT PAIN NOTED PAIN PRSNT: CPT | Performed by: ORTHOPAEDIC SURGERY

## 2022-12-06 PROCEDURE — 3078F DIAST BP <80 MM HG: CPT | Performed by: ORTHOPAEDIC SURGERY

## 2022-12-06 PROCEDURE — 99213 OFFICE O/P EST LOW 20 MIN: CPT | Performed by: ORTHOPAEDIC SURGERY

## 2022-12-06 PROCEDURE — 3074F SYST BP LT 130 MM HG: CPT | Performed by: ORTHOPAEDIC SURGERY

## 2022-12-06 RX ORDER — TRIAMCINOLONE ACETONIDE 40 MG/ML
40 INJECTION, SUSPENSION INTRA-ARTICULAR; INTRAMUSCULAR ONCE
Status: COMPLETED | OUTPATIENT
Start: 2022-12-06 | End: 2022-12-06

## 2022-12-06 RX ADMIN — TRIAMCINOLONE ACETONIDE 40 MG: 40 INJECTION, SUSPENSION INTRA-ARTICULAR; INTRAMUSCULAR at 10:31:00

## 2022-12-06 NOTE — PROGRESS NOTES
Per verbal order from Dr. Aguilar Dense 1mL kenalog 40mg and 5mL of marcaine was drawn up and injected into the patients right shoulder by provider. Cortisone Handout provided to patient.

## 2023-02-12 ENCOUNTER — TELEPHONE (OUTPATIENT)
Dept: INTERNAL MEDICINE CLINIC | Facility: CLINIC | Age: 73
End: 2023-02-12

## 2023-02-12 NOTE — TELEPHONE ENCOUNTER
pls call and remind pt that her gastro had ordered mrcp with and withhout contrast to be done on Jan 2023 as aff for pancreatic cyst (this was in progress note for Dr Catie Deutsch last year)

## 2023-02-23 ENCOUNTER — NURSE TRIAGE (OUTPATIENT)
Dept: INTERNAL MEDICINE CLINIC | Facility: CLINIC | Age: 73
End: 2023-02-23

## 2023-02-23 NOTE — TELEPHONE ENCOUNTER
Spoke with patient, (  Name and  verified ) informed of ,s  instructions below    Patient declines appointment but nay go to [facility]    Provided locations/ hours for [facility]    Patient verbalizes understanding and agrees with plan.

## 2023-03-23 ENCOUNTER — NURSE TRIAGE (OUTPATIENT)
Dept: INTERNAL MEDICINE CLINIC | Facility: CLINIC | Age: 73
End: 2023-03-23

## 2023-03-24 ENCOUNTER — OFFICE VISIT (OUTPATIENT)
Dept: INTERNAL MEDICINE CLINIC | Facility: CLINIC | Age: 73
End: 2023-03-24

## 2023-03-24 VITALS
DIASTOLIC BLOOD PRESSURE: 67 MMHG | SYSTOLIC BLOOD PRESSURE: 108 MMHG | HEIGHT: 63 IN | TEMPERATURE: 98 F | WEIGHT: 155.13 LBS | BODY MASS INDEX: 27.49 KG/M2 | HEART RATE: 89 BPM | OXYGEN SATURATION: 95 %

## 2023-03-24 DIAGNOSIS — I15.2 HYPERTENSION ASSOCIATED WITH TYPE 2 DIABETES MELLITUS (HCC): Primary | ICD-10-CM

## 2023-03-24 DIAGNOSIS — N63.13 MASS OF LOWER OUTER QUADRANT OF RIGHT BREAST: ICD-10-CM

## 2023-03-24 DIAGNOSIS — K86.2 PANCREATIC CYST: ICD-10-CM

## 2023-03-24 DIAGNOSIS — I70.0 ATHEROSCLEROSIS OF AORTA (HCC): ICD-10-CM

## 2023-03-24 DIAGNOSIS — E11.9 TYPE 2 DIABETES MELLITUS WITHOUT COMPLICATION, WITHOUT LONG-TERM CURRENT USE OF INSULIN (HCC): ICD-10-CM

## 2023-03-24 DIAGNOSIS — E11.59 HYPERTENSION ASSOCIATED WITH TYPE 2 DIABETES MELLITUS (HCC): Primary | ICD-10-CM

## 2023-03-24 DIAGNOSIS — C50.911 HORMONE RECEPTOR POSITIVE BREAST CANCER, RIGHT (HCC): ICD-10-CM

## 2023-03-24 LAB
CARTRIDGE LOT#: ABNORMAL NUMERIC
HEMOGLOBIN A1C: 6.5 % (ref 4.3–5.6)

## 2023-03-24 PROCEDURE — 1125F AMNT PAIN NOTED PAIN PRSNT: CPT | Performed by: INTERNAL MEDICINE

## 2023-03-24 PROCEDURE — 3008F BODY MASS INDEX DOCD: CPT | Performed by: INTERNAL MEDICINE

## 2023-03-24 PROCEDURE — 83036 HEMOGLOBIN GLYCOSYLATED A1C: CPT | Performed by: INTERNAL MEDICINE

## 2023-03-24 PROCEDURE — 99214 OFFICE O/P EST MOD 30 MIN: CPT | Performed by: INTERNAL MEDICINE

## 2023-03-24 PROCEDURE — 3078F DIAST BP <80 MM HG: CPT | Performed by: INTERNAL MEDICINE

## 2023-03-24 PROCEDURE — 3074F SYST BP LT 130 MM HG: CPT | Performed by: INTERNAL MEDICINE

## 2023-03-24 PROCEDURE — 3044F HG A1C LEVEL LT 7.0%: CPT | Performed by: INTERNAL MEDICINE

## 2023-03-24 RX ORDER — AMLODIPINE BESYLATE 2.5 MG/1
2.5 TABLET ORAL 2 TIMES DAILY
Qty: 180 TABLET | Refills: 1 | Status: SHIPPED | OUTPATIENT
Start: 2023-03-24

## 2023-04-04 ENCOUNTER — TELEPHONE (OUTPATIENT)
Dept: INTERNAL MEDICINE CLINIC | Facility: CLINIC | Age: 73
End: 2023-04-04

## 2023-04-04 NOTE — TELEPHONE ENCOUNTER
Left message for pt to call back to inform that MRI MRCP has been approved and is valid until 5/4/2023.  Pt may contact central scheduling at [phone] to schedule exam.

## 2023-05-01 ENCOUNTER — HOSPITAL ENCOUNTER (OUTPATIENT)
Dept: MRI IMAGING | Age: 73
Discharge: HOME | End: 2023-05-01
Attending: INTERNAL MEDICINE
Payer: MEDICARE

## 2023-05-01 DIAGNOSIS — K86.2 PANCREATIC CYST: ICD-10-CM

## 2023-05-01 PROCEDURE — 74183 MRI ABD W/O CNTR FLWD CNTR: CPT | Performed by: INTERNAL MEDICINE

## 2023-05-01 PROCEDURE — A9575 INJ GADOTERATE MEGLUMI 0.1ML: HCPCS | Performed by: INTERNAL MEDICINE

## 2023-05-01 RX ORDER — GADOTERATE MEGLUMINE 376.9 MG/ML
15 INJECTION INTRAVENOUS
Status: COMPLETED | OUTPATIENT
Start: 2023-05-01 | End: 2023-05-01

## 2023-05-01 RX ADMIN — GADOTERATE MEGLUMINE 15 ML: 376.9 INJECTION INTRAVENOUS at 08:11:00

## 2023-05-02 ENCOUNTER — HOSPITAL ENCOUNTER (OUTPATIENT)
Dept: MAMMOGRAPHY | Facility: HOSPITAL | Age: 73
Discharge: HOME | End: 2023-05-02
Attending: INTERNAL MEDICINE
Payer: MEDICARE

## 2023-05-02 ENCOUNTER — HOSPITAL ENCOUNTER (OUTPATIENT)
Dept: ULTRASOUND IMAGING | Facility: HOSPITAL | Age: 73
Discharge: HOME | End: 2023-05-02
Attending: INTERNAL MEDICINE
Payer: MEDICARE

## 2023-05-02 DIAGNOSIS — N63.13 MASS OF LOWER OUTER QUADRANT OF RIGHT BREAST: ICD-10-CM

## 2023-05-02 PROCEDURE — 77066 DX MAMMO INCL CAD BI: CPT | Performed by: INTERNAL MEDICINE

## 2023-05-02 PROCEDURE — 76642 ULTRASOUND BREAST LIMITED: CPT | Performed by: INTERNAL MEDICINE

## 2023-05-02 PROCEDURE — 77062 BREAST TOMOSYNTHESIS BI: CPT | Performed by: INTERNAL MEDICINE

## 2023-05-03 ENCOUNTER — TELEPHONE (OUTPATIENT)
Dept: INTERNAL MEDICINE CLINIC | Facility: CLINIC | Age: 73
End: 2023-05-03

## 2023-05-03 DIAGNOSIS — K86.2 PANCREATIC CYST: Primary | ICD-10-CM

## 2023-05-23 ENCOUNTER — OFFICE VISIT (OUTPATIENT)
Dept: ORTHOPEDICS CLINIC | Facility: CLINIC | Age: 73
End: 2023-05-23

## 2023-05-23 VITALS — DIASTOLIC BLOOD PRESSURE: 76 MMHG | SYSTOLIC BLOOD PRESSURE: 115 MMHG | HEART RATE: 84 BPM

## 2023-05-23 DIAGNOSIS — M75.21 BICEPS TENDINITIS OF RIGHT SHOULDER: ICD-10-CM

## 2023-05-23 DIAGNOSIS — S43.431D SUPERIOR GLENOID LABRUM LESION OF RIGHT SHOULDER, SUBSEQUENT ENCOUNTER: ICD-10-CM

## 2023-05-23 DIAGNOSIS — M75.41 SUBACROMIAL IMPINGEMENT OF RIGHT SHOULDER: ICD-10-CM

## 2023-05-23 DIAGNOSIS — M75.81 TENDINITIS OF RIGHT ROTATOR CUFF: Primary | ICD-10-CM

## 2023-05-23 DIAGNOSIS — M75.111 NONTRAUMATIC INCOMPLETE TEAR OF RIGHT ROTATOR CUFF: ICD-10-CM

## 2023-05-23 PROCEDURE — 1159F MED LIST DOCD IN RCRD: CPT | Performed by: ORTHOPAEDIC SURGERY

## 2023-05-23 PROCEDURE — 20610 DRAIN/INJ JOINT/BURSA W/O US: CPT | Performed by: ORTHOPAEDIC SURGERY

## 2023-05-23 PROCEDURE — 1125F AMNT PAIN NOTED PAIN PRSNT: CPT | Performed by: ORTHOPAEDIC SURGERY

## 2023-05-23 PROCEDURE — 99213 OFFICE O/P EST LOW 20 MIN: CPT | Performed by: ORTHOPAEDIC SURGERY

## 2023-05-23 PROCEDURE — 3078F DIAST BP <80 MM HG: CPT | Performed by: ORTHOPAEDIC SURGERY

## 2023-05-23 PROCEDURE — 1160F RVW MEDS BY RX/DR IN RCRD: CPT | Performed by: ORTHOPAEDIC SURGERY

## 2023-05-23 PROCEDURE — 3074F SYST BP LT 130 MM HG: CPT | Performed by: ORTHOPAEDIC SURGERY

## 2023-05-23 RX ORDER — TRIAMCINOLONE ACETONIDE 40 MG/ML
40 INJECTION, SUSPENSION INTRA-ARTICULAR; INTRAMUSCULAR ONCE
Status: COMPLETED | OUTPATIENT
Start: 2023-05-23 | End: 2023-05-23

## 2023-05-23 RX ADMIN — TRIAMCINOLONE ACETONIDE 40 MG: 40 INJECTION, SUSPENSION INTRA-ARTICULAR; INTRAMUSCULAR at 11:29:00

## 2023-05-23 NOTE — PROGRESS NOTES
Per verbal order from AUGUSTINA Hamilton, draw up 5ml of 0.5% Marcaine and 1ml of Kenalog 40 for cortisone injection to right shoulder [address]  Patient provided education handout for cortisone injection.

## 2023-06-17 NOTE — TELEPHONE ENCOUNTER
Patient is requesting medication refill below- patient stated that pharm has requested twice so far and no refill was sent. . Please high priority this-patient is out of medicine    losartan 50 MG Oral Tab    atorvastatin 40 MG Oral Tab

## 2023-06-19 RX ORDER — LOSARTAN POTASSIUM 50 MG/1
50 TABLET ORAL 2 TIMES DAILY
Qty: 180 TABLET | Refills: 1 | Status: SHIPPED | OUTPATIENT
Start: 2023-06-19

## 2023-06-19 RX ORDER — ATORVASTATIN CALCIUM 40 MG/1
40 TABLET, FILM COATED ORAL NIGHTLY
Qty: 90 TABLET | Refills: 3 | Status: SHIPPED | OUTPATIENT
Start: 2023-06-19

## 2023-06-19 NOTE — TELEPHONE ENCOUNTER
Refill passed per [place] [employer] protocol. Requested Prescriptions   Pending Prescriptions Disp Refills    losartan 50 MG Oral Tab 180 tablet 1     Sig: Take 1 tablet (50 mg total) by mouth 2 (two) times daily. Hypertensive Medications Protocol Failed - 6/17/2023 11:09 AM        Failed - CMP or BMP in past 6 months     No results found for this or any previous visit (from the past 4392 hour(s)).             Passed - In person appointment in the past 12 or next 3 months     Recent Outpatient Visits              3 weeks ago Tendinitis of right rotator cuff    Donna Najera MD    Office Visit    2 months ago Hypertension associated with type 2 diabetes mellitus [facility])    Suzanne Najera MD    Office Visit    6 months ago Nontraumatic incomplete tear of right rotator cuff    [address], [address], Donna Laws MD    Office Visit    7 months ago Age-related osteoporosis without current pathological fracture    Suzanne Najera MD    Office Visit    9 months ago Hypertension associated with type 2 diabetes mellitus [facility])    Suzanne Najera MD    Office Visit          Future Appointments         Provider Department Appt Notes    In 3 weeks MD Jose Carter Addison MAPKIERRA               Passed - Last BP reading less than 140/90     BP Readings from Last 1 Encounters:  05/23/23 : 115/76              Passed - In person appointment or virtual visit in the past 6 months     Recent Outpatient Visits              3 weeks ago Tendinitis of right rotator cuff    [address], [address], Donna Laws MD    Office Visit    2 months ago Hypertension associated with type 2 diabetes mellitus [facility])    [address] [place], [place] [address]Sundar MD    Office Visit    6 months ago Nontraumatic incomplete tear of right rotator cuff    [address] [place], [place] [address]Naif MD    Office Visit    7 months ago Age-related osteoporosis without current pathological fracture    [address], [address], Sundar Álvarez MD    Office Visit    9 months ago Hypertension associated with type 2 diabetes mellitus [facility])    [address], [address], Sundar Álvarez MD    Office Visit          Future Appointments         Provider Department Appt Notes    In 3 weeks Graham Alicea MD [address] [place] [address], [place] [facility]               Passed - [facility] or GFRNAA > 50     GFR Evaluation  EGFRCR: 96 , resulted on 10/15/2022            atorvastatin 40 MG Oral Tab 90 tablet 1     Sig: Take 1 tablet (40 mg total) by mouth nightly.        Cholesterol Medication Protocol Passed - 6/17/2023 11:09 AM        Passed - ALT in past 12 months        Passed - LDL in past 12 months        Passed - Last ALT < 80     Lab Results   Component Value Date    ALT 26 10/15/2022             Passed - Last LDL < 130     Lab Results   Component Value Date    LDL 41 10/15/2022             Passed - In person appointment or virtual visit in the past 12 mos or appointment in next 3 mos     Recent Outpatient Visits              3 weeks ago Tendinitis of right rotator cuff    [address], Rell Montanez MD    Office Visit    2 months ago Hypertension associated with type 2 diabetes mellitus [facility])    [address] [place], [place] [address]Sundar MD    Office Visit    6 months ago Nontraumatic incomplete tear of right rotator cuff    [address] [place], [place] [address]Naif MD    Office Visit    7 months ago Age-related osteoporosis without current pathological fracture    Magan Lara MD    Office Visit    9 months ago Hypertension associated with type 2 diabetes mellitus [facility])    Magan Lara MD    Office Visit          Future Appointments         Provider Department Appt Notes    In 3 weeks MD Amalia Beckett Addison [facility]                  Future Appointments         Provider Department Appt Notes    In 3 weeks MD Amalia Beckett, Saul [facility]          Recent Outpatient Visits              3 weeks ago Tendinitis of right rotator cuff    [address], [address], Lazaro Fishman MD    Office Visit    2 months ago Hypertension associated with type 2 diabetes mellitus [facility])    Magan Lara MD    Office Visit    6 months ago Nontraumatic incomplete tear of right rotator cuff    [address], [address], Lazaro Fishman MD    Office Visit    7 months ago Age-related osteoporosis without current pathological fracture    Magan Lara MD    Office Visit    9 months ago Hypertension associated with type 2 diabetes mellitus [facility])    Magan Lara MD    Office Visit

## 2023-06-19 NOTE — TELEPHONE ENCOUNTER
Please review; protocol failed. Requested Prescriptions   Pending Prescriptions Disp Refills    losartan 50 MG Oral Tab 180 tablet 1     Sig: Take 1 tablet (50 mg total) by mouth 2 (two) times daily. Hypertensive Medications Protocol Failed - 6/17/2023 11:09 AM        Failed - CMP or BMP in past 6 months     No results found for this or any previous visit (from the past 4392 hour(s)).             Passed - In person appointment in the past 12 or next 3 months     Recent Outpatient Visits              3 weeks ago Tendinitis of right rotator cuff    Krystal Villaseñor MD    Office Visit    2 months ago Hypertension associated with type 2 diabetes mellitus [facility])    Dolores Villaseñor MD    Office Visit    6 months ago Nontraumatic incomplete tear of right rotator cuff    Gab[facility]ad[facility] Frost, [address], Krystal Thomas MD    Office Visit    7 months ago Age-related osteoporosis without current pathological fracture    Dolores Villaseñor MD    Office Visit    9 months ago Hypertension associated with type 2 diabetes mellitus [facility])    Dolores Villaseñor MD    Office Visit          Future Appointments         Provider Department Appt Notes    In 3 weeks MD Aleshia Archuleta, Saul [facility]               Passed - Last BP reading less than 140/90     BP Readings from Last 1 Encounters:  05/23/23 : 115/76              Passed - In person appointment or virtual visit in the past 6 months     Recent Outpatient Visits              3 weeks ago Tendinitis of right rotator cuff    Gabbynathan Margot, [address], Krystal Thomas MD    Office Visit    2 months ago Hypertension associated with type 2 diabetes mellitus ([facility] [address] [facility], Magan Tavares MD    Office Visit    6 months ago Nontraumatic incomplete tear of right rotator cuff    [address] [facility], Lazaro Fishman MD    Office Visit    7 months ago Age-related osteoporosis without current pathological fracture    [address], [address], Magan Tavares MD    Office Visit    9 months ago Hypertension associated with type 2 diabetes mellitus [facility])    [address], [address], Magan Tavares MD    Office Visit          Future Appointments         Provider Department Appt Notes    In 3 weeks Lowell Hannon MD [address] [facility], [place] [facility]               Passed - [facility] or GFRNAA > 50     GFR Evaluation  EGFRCR: 96 , resulted on 10/15/2022           Signed Prescriptions Disp Refills    atorvastatin 40 MG Oral Tab 90 tablet 3     Sig: Take 1 tablet (40 mg total) by mouth nightly.        Cholesterol Medication Protocol Passed - 6/17/2023 11:09 AM        Passed - ALT in past 12 months        Passed - LDL in past 12 months        Passed - Last ALT < 80     Lab Results   Component Value Date    ALT 26 10/15/2022             Passed - Last LDL < 130     Lab Results   Component Value Date    LDL 41 10/15/2022             Passed - In person appointment or virtual visit in the past 12 mos or appointment in next 3 mos     Recent Outpatient Visits              3 weeks ago Tendinitis of right rotator cuff    [address], Christine Lipscomb MD    Office Visit    2 months ago Hypertension associated with type 2 diabetes mellitus [facility])    [address], Magan Tavares MD    Office Visit    6 months ago Nontraumatic incomplete tear of right rotator cuff    [address], Lazaro Fishman MD    Office Visit    7 months ago Age-related osteoporosis without current pathological fracture    [address], Dianne Mustafa MD    Office Visit    9 months ago Hypertension associated with type 2 diabetes mellitus [facility])    [address], Dianne Mustafa MD    Office Visit          Future Appointments         Provider Department Appt Notes    In 3 weeks Alysa Chávez MD [address] [facility], [place] [facility]                  Future Appointments         Provider Department Appt Notes    In 3 weeks Alysa Chávez MD [address] [facility], [place] [facility]          Recent Outpatient Visits              3 weeks ago Tendinitis of right rotator cuff    [address], [address], Didi Cortes MD    Office Visit    2 months ago Hypertension associated with type 2 diabetes mellitus [facility])    [address], Dianne Mustafa MD    Office Visit    6 months ago Nontraumatic incomplete tear of right rotator cuff    [address], [address], Didi Cortes MD    Office Visit    7 months ago Age-related osteoporosis without current pathological fracture    [address], Dianne Mustafa MD    Office Visit    9 months ago Hypertension associated with type 2 diabetes mellitus [facility])    [address], Dianne Mustafa MD    Office Visit

## 2023-07-20 NOTE — TELEPHONE ENCOUNTER
Current Outpatient Medications:       hydroCHLOROthiazide 12.5 MG Oral Tab, Take 1 tablet (12.5 mg total) by mouth BID (Diuretic). , Disp: 180 tablet, Rfl: 0

## 2023-07-21 RX ORDER — HYDROCHLOROTHIAZIDE 12.5 MG/1
12.5 TABLET ORAL
Qty: 180 TABLET | Refills: 1 | Status: SHIPPED | OUTPATIENT
Start: 2023-07-21

## 2023-07-21 NOTE — TELEPHONE ENCOUNTER
Please review. Protocol failed / Has no protocol. Requested Prescriptions   Pending Prescriptions Disp Refills    hydroCHLOROthiazide 12.5 MG Oral Tab 180 tablet 3     Sig: Take 1 tablet (12.5 mg total) by mouth BID (Diuretic). Hypertensive Medications Protocol Failed - 7/20/2023  2:57 PM        Failed - CMP or BMP in past 6 months     No results found for this or any previous visit (from the past 4392 hour(s)).             Passed - In person appointment in the past 12 or next 3 months     Recent Outpatient Visits              1 month ago Tendinitis of right rotator cuff    Lazaro Silverman MD    Office Visit    3 months ago Hypertension associated with type 2 diabetes mellitus [facility])    Magan Silverman MD    Office Visit    7 months ago Nontraumatic incomplete tear of right rotator cuff    Finn Chauhan, [address], Lazaro Fishman MD    Office Visit    8 months ago Age-related osteoporosis without current pathological fracture    [address], Magan Tavares MD    Office Visit    10 months ago Hypertension associated with type 2 diabetes mellitus [facility])    [address] Bull, Magan Tavares MD    Office Visit                      Passed - Last BP reading less than 140/90     BP Readings from Last 1 Encounters:  05/23/23 : 115/76              Passed - In person appointment or virtual visit in the past 6 months     Recent Outpatient Visits              1 month ago Tendinitis of right rotator cuff    Finn Chauhan, [address], Lazaro Fishman MD    Office Visit    3 months ago Hypertension associated with type 2 diabetes mellitus [facility])    Magan Silverman MD    Office Visit    7 months ago Nontraumatic incomplete tear of right rotator cuff    Placido Correa MD    Office Visit    8 months ago Age-related osteoporosis without current pathological fracture    [address] Cheema, Breann De La Cruz MD    Office Visit    10 months ago Hypertension associated with type 2 diabetes mellitus [facility])    Breann Correa MD    Office Visit                      Passed - EGFRCR or GFRNAA > 50     GFR Evaluation  EGFRCR: 96 , resulted on 10/15/2022                Recent Outpatient Visits              1 month ago Tendinitis of right rotator cuff    Ciro Godinez, [address], Placido Gant MD    Office Visit    3 months ago Hypertension associated with type 2 diabetes mellitus [facility])    Breann Correa MD    Office Visit    7 months ago Nontraumatic incomplete tear of right rotator cuff    Ciro Godinez, [address], Placido Gant MD    Office Visit    8 months ago Age-related osteoporosis without current pathological fracture    [address] Cheema, Breann De La Cruz MD    Office Visit    10 months ago Hypertension associated with type 2 diabetes mellitus [facility])    Breann Correa MD    Office Visit

## 2023-08-31 ENCOUNTER — NURSE TRIAGE (OUTPATIENT)
Dept: INTERNAL MEDICINE CLINIC | Facility: CLINIC | Age: 73
End: 2023-08-31

## 2023-09-05 ENCOUNTER — OFFICE VISIT (OUTPATIENT)
Dept: INTERNAL MEDICINE CLINIC | Facility: CLINIC | Age: 73
End: 2023-09-05

## 2023-09-05 VITALS
BODY MASS INDEX: 27.82 KG/M2 | TEMPERATURE: 98 F | WEIGHT: 157 LBS | SYSTOLIC BLOOD PRESSURE: 118 MMHG | OXYGEN SATURATION: 95 % | HEART RATE: 78 BPM | DIASTOLIC BLOOD PRESSURE: 66 MMHG | HEIGHT: 63 IN

## 2023-09-05 DIAGNOSIS — Z92.89 HISTORY OF POSITIVE PPD: ICD-10-CM

## 2023-09-05 DIAGNOSIS — M81.0 AGE-RELATED OSTEOPOROSIS WITHOUT CURRENT PATHOLOGICAL FRACTURE: ICD-10-CM

## 2023-09-05 DIAGNOSIS — E53.8 VITAMIN B12 DEFICIENCY: ICD-10-CM

## 2023-09-05 DIAGNOSIS — I70.0 ATHEROSCLEROSIS OF AORTA (HCC): ICD-10-CM

## 2023-09-05 DIAGNOSIS — J30.2 SEASONAL ALLERGIC RHINITIS, UNSPECIFIED TRIGGER: ICD-10-CM

## 2023-09-05 DIAGNOSIS — Z01.00 DIABETIC EYE EXAM (HCC): ICD-10-CM

## 2023-09-05 DIAGNOSIS — Z86.000 HISTORY OF DUCTAL CARCINOMA IN SITU (DCIS) OF BREAST: ICD-10-CM

## 2023-09-05 DIAGNOSIS — S16.1XXA STRAIN OF NECK MUSCLE, INITIAL ENCOUNTER: ICD-10-CM

## 2023-09-05 DIAGNOSIS — K86.2 PANCREATIC CYST: ICD-10-CM

## 2023-09-05 DIAGNOSIS — E11.9 TYPE 2 DIABETES MELLITUS WITHOUT COMPLICATION, WITHOUT LONG-TERM CURRENT USE OF INSULIN (HCC): ICD-10-CM

## 2023-09-05 DIAGNOSIS — H40.9 GLAUCOMA, UNSPECIFIED GLAUCOMA TYPE, UNSPECIFIED LATERALITY: ICD-10-CM

## 2023-09-05 DIAGNOSIS — E11.69 HYPERLIPIDEMIA ASSOCIATED WITH TYPE 2 DIABETES MELLITUS: ICD-10-CM

## 2023-09-05 DIAGNOSIS — E55.9 VITAMIN D DEFICIENCY: ICD-10-CM

## 2023-09-05 DIAGNOSIS — Z12.11 COLON CANCER SCREENING: ICD-10-CM

## 2023-09-05 DIAGNOSIS — E11.59 HYPERTENSION ASSOCIATED WITH TYPE 2 DIABETES MELLITUS: ICD-10-CM

## 2023-09-05 DIAGNOSIS — I15.2 HYPERTENSION ASSOCIATED WITH TYPE 2 DIABETES MELLITUS: ICD-10-CM

## 2023-09-05 DIAGNOSIS — E78.5 HYPERLIPIDEMIA ASSOCIATED WITH TYPE 2 DIABETES MELLITUS: ICD-10-CM

## 2023-09-05 DIAGNOSIS — Z00.00 MEDICARE ANNUAL WELLNESS VISIT, SUBSEQUENT: Primary | ICD-10-CM

## 2023-09-05 DIAGNOSIS — E11.9 DIABETIC EYE EXAM (HCC): ICD-10-CM

## 2023-09-05 LAB
CARTRIDGE LOT#: ABNORMAL NUMERIC
HEMOGLOBIN A1C: 6.6 % (ref 4.3–5.6)

## 2023-09-11 PROBLEM — C50.911 HORMONE RECEPTOR POSITIVE BREAST CANCER, RIGHT (HCC): Status: RESOLVED | Noted: 2018-05-30 | Resolved: 2023-09-11

## 2023-09-22 ENCOUNTER — TELEPHONE (OUTPATIENT)
Dept: INTERNAL MEDICINE CLINIC | Facility: CLINIC | Age: 73
End: 2023-09-22

## 2023-09-22 DIAGNOSIS — M25.511 RIGHT SHOULDER PAIN, UNSPECIFIED CHRONICITY: Primary | ICD-10-CM

## 2023-09-25 NOTE — TELEPHONE ENCOUNTER
Patient is requesting referral.     Name of specialist and specialty department : Marcus Bravo  Reason for visit with the specialist: shoulder pain  Address of the specialist office:[facility] [place] [address], [address] [address]  Appointment date: N/A         CSS informed patient the turnaround time for referral is 5-7 business days. Patient was informed to check their [email or web address] account for referral status.

## 2023-09-25 NOTE — TELEPHONE ENCOUNTER
Patient saw Dr. Paige Carvalho on 5/23/23 for right shoulder pain and received a cortisone injection and orders for physical therapy. Referral pending your review and approval.          Plan: Patient is not interested in surgical management. She desired another cortisone injection and physical therapy. Aspiration of the right shoulder was negative and she tolerated the injection bupivacaine 0.5% 5 cc Kenalog 1 cc without issue. Most the medicine went to the glenohumeral joint but somewhat to the subacromial space. She will do the home exercise program she learned in therapy. She can have injections about every 4 to 5 months. She was advised to watch her glucose levels as she is diabetic. If they go over 300, she will call Dr. Gutierrez Late.  For now we will see her as needed.

## 2023-10-06 ENCOUNTER — TELEPHONE (OUTPATIENT)
Dept: ORTHOPEDICS CLINIC | Facility: CLINIC | Age: 73
End: 2023-10-06

## 2023-10-06 NOTE — TELEPHONE ENCOUNTER
If she does not mind waiting in 1 to 2 hours, maybe we can put her in next Tuesday for cortisone shot to the shoulder.

## 2023-10-06 NOTE — TELEPHONE ENCOUNTER
S/w patient- she is okay with waiting on Tuesday.  Accepted 9am appointment with Dr Kale Treviño on 10/10/23

## 2023-10-06 NOTE — TELEPHONE ENCOUNTER
Patient stating she can't no longer elevate her right arm she would like to know if she can be schedule for a sooner date  No openings available

## 2023-10-06 NOTE — TELEPHONE ENCOUNTER
S/w patient- Patient last seen on 5/23/23 for right shoulder injection. States that injection helped but over the last few months, pain has gotten worse. States that pain has been increasing the last few days. States that it is difficult to lift up arm. She was wondering if she could be seen sooner for cortisone injection. She has appointment on 11/7/23. I advised that if pain continues to get worse, she should go to . She states that she has used ice and meloxicam intermittently for pain.     Please advise

## 2023-10-09 ENCOUNTER — HOSPITAL ENCOUNTER (OUTPATIENT)
Dept: GENERAL RADIOLOGY | Age: 73
Discharge: HOME | End: 2023-10-09
Attending: INTERNAL MEDICINE
Payer: MEDICARE

## 2023-10-09 ENCOUNTER — LAB ENCOUNTER (OUTPATIENT)
Dept: LAB | Age: 73
End: 2023-10-09
Attending: INTERNAL MEDICINE
Payer: MEDICARE

## 2023-10-09 DIAGNOSIS — E11.9 TYPE 2 DIABETES MELLITUS WITHOUT COMPLICATION, WITHOUT LONG-TERM CURRENT USE OF INSULIN (HCC): ICD-10-CM

## 2023-10-09 DIAGNOSIS — Z00.00 MEDICARE ANNUAL WELLNESS VISIT, SUBSEQUENT: ICD-10-CM

## 2023-10-09 DIAGNOSIS — Z92.89 HISTORY OF POSITIVE PPD: ICD-10-CM

## 2023-10-09 DIAGNOSIS — E11.69 HYPERLIPIDEMIA ASSOCIATED WITH TYPE 2 DIABETES MELLITUS: ICD-10-CM

## 2023-10-09 DIAGNOSIS — E78.5 HYPERLIPIDEMIA ASSOCIATED WITH TYPE 2 DIABETES MELLITUS: ICD-10-CM

## 2023-10-09 DIAGNOSIS — E55.9 VITAMIN D DEFICIENCY: ICD-10-CM

## 2023-10-09 LAB
ALBUMIN SERPL-MCNC: 4.1 G/DL (ref 3.4–5)
ALBUMIN/GLOB SERPL: 1.2 {RATIO} (ref 1–2)
ALP LIVER SERPL-CCNC: 77 U/L
ALT SERPL-CCNC: 33 U/L
ANION GAP SERPL CALC-SCNC: 3 MMOL/L (ref 0–18)
AST SERPL-CCNC: 14 U/L (ref 15–37)
BASOPHILS # BLD AUTO: 0.04 X10(3) UL (ref 0–0.2)
BASOPHILS NFR BLD AUTO: 0.5 %
BILIRUB SERPL-MCNC: 0.5 MG/DL (ref 0.1–2)
BUN BLD-MCNC: 18 MG/DL (ref 7–18)
CALCIUM BLD-MCNC: 9.3 MG/DL (ref 8.5–10.1)
CHLORIDE SERPL-SCNC: 106 MMOL/L (ref 98–112)
CHOLEST SERPL-MCNC: 123 MG/DL (ref ?–200)
CO2 SERPL-SCNC: 29 MMOL/L (ref 21–32)
CREAT BLD-MCNC: 0.7 MG/DL
CREAT UR-SCNC: 157 MG/DL
EGFRCR SERPLBLD CKD-EPI 2021: 92 ML/MIN/1.73M2 (ref 60–?)
EOSINOPHIL # BLD AUTO: 0.12 X10(3) UL (ref 0–0.7)
EOSINOPHIL NFR BLD AUTO: 1.4 %
ERYTHROCYTE [DISTWIDTH] IN BLOOD BY AUTOMATED COUNT: 14.9 %
FASTING PATIENT LIPID ANSWER: YES
FASTING STATUS PATIENT QL REPORTED: YES
GLOBULIN PLAS-MCNC: 3.4 G/DL (ref 2.8–4.4)
GLUCOSE BLD-MCNC: 99 MG/DL (ref 70–99)
HCT VFR BLD AUTO: 36.7 %
HDLC SERPL-MCNC: 59 MG/DL (ref 40–59)
HGB BLD-MCNC: 12.3 G/DL
IMM GRANULOCYTES # BLD AUTO: 0.03 X10(3) UL (ref 0–1)
IMM GRANULOCYTES NFR BLD: 0.4 %
LDLC SERPL CALC-MCNC: 39 MG/DL (ref ?–100)
LYMPHOCYTES # BLD AUTO: 3.27 X10(3) UL (ref 1–4)
LYMPHOCYTES NFR BLD AUTO: 38.2 %
MCH RBC QN AUTO: 30 PG (ref 26–34)
MCHC RBC AUTO-ENTMCNC: 33.5 G/DL (ref 31–37)
MCV RBC AUTO: 89.5 FL
MICROALBUMIN UR-MCNC: 3.05 MG/DL
MICROALBUMIN/CREAT 24H UR-RTO: 19.4 UG/MG (ref ?–30)
MONOCYTES # BLD AUTO: 0.6 X10(3) UL (ref 0.1–1)
MONOCYTES NFR BLD AUTO: 7 %
NEUTROPHILS # BLD AUTO: 4.51 X10 (3) UL (ref 1.5–7.7)
NEUTROPHILS # BLD AUTO: 4.51 X10(3) UL (ref 1.5–7.7)
NEUTROPHILS NFR BLD AUTO: 52.5 %
NONHDLC SERPL-MCNC: 64 MG/DL (ref ?–130)
OSMOLALITY SERPL CALC.SUM OF ELEC: 288 MOSM/KG (ref 275–295)
PLATELET # BLD AUTO: 358 10(3)UL (ref 150–450)
POTASSIUM SERPL-SCNC: 3.9 MMOL/L (ref 3.5–5.1)
PROT SERPL-MCNC: 7.5 G/DL (ref 6.4–8.2)
RBC # BLD AUTO: 4.1 X10(6)UL
SODIUM SERPL-SCNC: 138 MMOL/L (ref 136–145)
TRIGL SERPL-MCNC: 149 MG/DL (ref 30–149)
VIT D+METAB SERPL-MCNC: 29.1 NG/ML (ref 30–100)
VLDLC SERPL CALC-MCNC: 20 MG/DL (ref 0–30)
WBC # BLD AUTO: 8.6 X10(3) UL (ref 4–11)

## 2023-10-09 PROCEDURE — 80061 LIPID PANEL: CPT

## 2023-10-09 PROCEDURE — 82570 ASSAY OF URINE CREATININE: CPT

## 2023-10-09 PROCEDURE — 80053 COMPREHEN METABOLIC PANEL: CPT

## 2023-10-09 PROCEDURE — 85025 COMPLETE CBC W/AUTO DIFF WBC: CPT

## 2023-10-09 PROCEDURE — 36415 COLL VENOUS BLD VENIPUNCTURE: CPT

## 2023-10-09 PROCEDURE — 71046 X-RAY EXAM CHEST 2 VIEWS: CPT | Performed by: INTERNAL MEDICINE

## 2023-10-09 PROCEDURE — 82043 UR ALBUMIN QUANTITATIVE: CPT

## 2023-10-09 PROCEDURE — 82306 VITAMIN D 25 HYDROXY: CPT

## 2023-10-10 ENCOUNTER — OFFICE VISIT (OUTPATIENT)
Dept: ORTHOPEDICS CLINIC | Facility: CLINIC | Age: 73
End: 2023-10-10

## 2023-10-10 VITALS — SYSTOLIC BLOOD PRESSURE: 139 MMHG | DIASTOLIC BLOOD PRESSURE: 73 MMHG | HEART RATE: 85 BPM

## 2023-10-10 DIAGNOSIS — M75.111 NONTRAUMATIC INCOMPLETE TEAR OF RIGHT ROTATOR CUFF: ICD-10-CM

## 2023-10-10 DIAGNOSIS — S43.431D SUPERIOR GLENOID LABRUM LESION OF RIGHT SHOULDER, SUBSEQUENT ENCOUNTER: ICD-10-CM

## 2023-10-10 DIAGNOSIS — M75.41 SUBACROMIAL IMPINGEMENT OF RIGHT SHOULDER: ICD-10-CM

## 2023-10-10 DIAGNOSIS — M75.21 BICEPS TENDINITIS OF RIGHT SHOULDER: ICD-10-CM

## 2023-10-10 DIAGNOSIS — M75.81 TENDINITIS OF RIGHT ROTATOR CUFF: Primary | ICD-10-CM

## 2023-10-10 PROCEDURE — 99213 OFFICE O/P EST LOW 20 MIN: CPT | Performed by: ORTHOPAEDIC SURGERY

## 2023-10-10 PROCEDURE — 1160F RVW MEDS BY RX/DR IN RCRD: CPT | Performed by: ORTHOPAEDIC SURGERY

## 2023-10-10 PROCEDURE — 1159F MED LIST DOCD IN RCRD: CPT | Performed by: ORTHOPAEDIC SURGERY

## 2023-10-10 PROCEDURE — 20610 DRAIN/INJ JOINT/BURSA W/O US: CPT | Performed by: ORTHOPAEDIC SURGERY

## 2023-10-10 PROCEDURE — 3078F DIAST BP <80 MM HG: CPT | Performed by: ORTHOPAEDIC SURGERY

## 2023-10-10 PROCEDURE — 3075F SYST BP GE 130 - 139MM HG: CPT | Performed by: ORTHOPAEDIC SURGERY

## 2023-10-10 PROCEDURE — 1125F AMNT PAIN NOTED PAIN PRSNT: CPT | Performed by: ORTHOPAEDIC SURGERY

## 2023-10-10 RX ORDER — TRIAMCINOLONE ACETONIDE 40 MG/ML
40 INJECTION, SUSPENSION INTRA-ARTICULAR; INTRAMUSCULAR ONCE
Status: COMPLETED | OUTPATIENT
Start: 2023-10-10 | End: 2023-10-10

## 2023-10-10 NOTE — PROCEDURES
Per verbal order from Dr. Shera Litten, PA, draw up 5ml of 0.5% Marcaine and 1ml of Kenalog 40 for cortisone injection to right shoulder Jad Pryor CMA  Patient provided education handout for cortisone injection.

## 2023-10-10 NOTE — PROGRESS NOTES
NURSING INTAKE COMMENTS: Patient presents with: Follow - Up: Right shoulder pain, 7/10 pain scale       HPI: This 68year old female presents today for her first visit since 2022. We gave her cortisone injection in the pain relief lasted until just a month or 2 ago. No new trauma was reported. She describes significant shoulder pain with active motion but passively I could bring her up. She does not have adhesive capsulitis. She denies cervical radiculopathy complaints as well. 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 She does not lift patients and declines to see patients that require physical lifting. She was independently. She is prediabetic. Past Medical History:   Diagnosis Date    Allergic rhinitis 3/15/2011    Cataracts, bilateral     Diabetes ([address]     Diabetes mellitus, type II ([facility] [address] [facility] 2012    Ductal carcinoma in situ of right breast 2010    Endometrial thickening on ultra sound 8/11/2014    Glaucoma, right eye     Lumbar arthropathy 6/12/2018    Osteoarthritis     Other and unspecified hyperlipidemia     Positive PPD 2005    S/P INH prophylaxis for 6 months     Unspecified essential hypertension     Vitamin B12 deficiency anemia 1/3/2008    Vitamin D deficiency 10/31/2011     Past Surgical History:   Procedure Laterality Date    BIOPSY OF BREAST, NEEDLE CORE Bilateral 2011    BREAST BIOPSY Right 2001    [facility]    COLONOSCOPY  2008    COLONOSCOPY  06/2021    COLONOSCOPY N/A 06/25/2021    Procedure: COLONOSCOPY,;  Surgeon: Tess Garcia MD;  Location: [address] [place] [address]    HYSTERECTOMY  2016    [address] [place] [address] HYSTERECTOMY  09/26/2016    w/ BSO, benign    LUMPECTOMY RIGHT  08/06/2010    NEEDLE BIOPSY LEFT Left 08/12/2011    RADIATION RIGHT  2011    breast     Current Outpatient Medications   Medication Sig Dispense Refill    hydroCHLOROthiazide 12.5 MG Oral Tab Take 1 tablet (12.5 mg total) by mouth BID (Diuretic).  180 tablet 1    losartan 50 MG Oral Tab Take 1 tablet (50 mg total) by mouth 2 (two) times daily. 180 tablet 1    atorvastatin 40 MG Oral Tab Take 1 tablet (40 mg total) by mouth nightly. 90 tablet 3    amLODIPine 2.5 MG Oral Tab Take 1 tablet (2.5 mg total) by mouth in the morning and 1 tablet (2.5 mg total) before bedtime. 180 tablet 1    Meloxicam 7.5 MG Oral Tab Take 1 tablet (7.5 mg total) by mouth daily as needed for Pain. 90 tablet 1    Blood Glucose Monitoring Suppl (TRUE METRIX AIR GLUCOSE METER) w/Device Does not apply Kit Use to test Blood sugar daily 1 kit 0    ibuprofen 200 MG Oral Tab Take 0.5 tablets (100 mg total) by mouth as needed for Pain.      montelukast 10 MG Oral Tab Take 1 tablet (10 mg total) by mouth nightly. 30 tablet 3    PANTOPRAZOLE SODIUM 40 MG Oral Tab EC TAKE 1 TABLET(40 MG) BY MOUTH EVERY MORNING BEFORE BREAKFAST 90 tablet 0    Glucose Blood (TRUE METRIX BLOOD GLUCOSE TEST) In Vitro Strip 1 each by In Vitro route daily. 100 strip 0    Blood Glucose Monitoring Suppl (ACCU-CHEK GLADIS PLUS) w/Device Does not apply Kit Use to check glucose daily 1 kit 0    Glucose Blood (ACCU-CHEK GLADIS PLUS) In Vitro Strip 1 strip by Finger stick route daily. 100 strip 1    Accu-Chek Softclix Lancets Does not apply Misc 1 lancet by Finger stick route daily. 100 each 1    latanoprost (XALATAN) 0.005 % Ophthalmic Solution   3    Calcium Carbonate-Vitamin D 600-200 MG-UNIT Oral Cap Take  by mouth. 1 po q12hrs. aspirin 81 MG Oral Tab EC Take  by mouth. take 1 tablet (81MG)  by ORAL route  every day      Multiple Vitamin (MULTI-DAY) Oral Tab Take  by mouth.          Latex                     Family History   Problem Relation Age of Onset    Stroke Father 80    Hypertension Father 80        myocardial infarction    Heart Disease Father 80        CAD    Heart Disorder Father     Heart Disease Mother 68        CAD    Hypertension Mother         mycardial infarction    Musculo-skelatal Disorder Mother         osteoporosis    Heart Disorder Mother Breast Cancer Sister 48        chemo; doing fine    Cancer Sister         breast cancer    Breast Cancer Maternal Aunt         post    Breast Cancer Self 61    DCIS Self 61     No family Hx of DVT/PE    Social History    Occupational History      Occupation: Home Health nurse      Occupation: CNA        Employer: [employer]    Tobacco Use      Smoking status: Never      Smokeless tobacco: Never    Vaping Use      Vaping Use: Never used    Substance and Sexual Activity      Alcohol use: No        Alcohol/week: 0.0 standard drinks of alcohol      Drug use: No      Sexual activity: Not on file       Review of Systems:  GENERAL: feels generally well, no significant weight loss or weight gain  SKIN: no ulcerated or worrisome skin lesions  EYES:denies blurred vision or double vision  HEENT: denies new nasal congestion, sinus pain or ST  LUNGS: denies shortness of breath  CARDIOVASCULAR: denies chest pain  GI: no hematemesis, no worsening heartburn, no diarrhea  : no dysuria, no blood in urine, no difficulty urinating, no incontinence  MUSCULOSKELETAL: no other musculoskeletal complaints other than in HPI  NEURO: no numbness or tingling, no weakness or balance disorder  PSYCHE: no depression or anxiety  HEMATOLOGIC: no hx of blood dyscrasia, no Hx DVT/PE  ENDOCRINE: no thyroid or diabetes issues  ALL/ASTHMA: no new hx of severe allergy or asthma    Physical Examination:    /73 (BP Location: Left arm, Patient Position: Sitting, Cuff Size: adult)   Pulse 85   Constitutional: appears well hydrated, alert and responsive, no acute distress noted  Extremities: The right shoulder no asymmetric warmth or deformity. No deltoid atrophy, biceps defect, or scapular winging. Musculoskeletal: Active motion went to 90 degrees but passively I brought her to 150 degrees. Internal rotation of the thumb to the upper lumbar spine. The rotator cuff strengths were normal but impingement sign is markedly positive. Biceps sign mildly positive. No acromioclavicular tenderness with negative crossover. Neurological: Normal motor and sensory right upper extremity without signs of cervical radiculopathy. Imaging: None taken today. MRI right shoulder from October 2022 had been reviewed with her a year ago. XR CHEST PA + LAT CHEST (CPT=71046)    Result Date: 10/9/2023  PROCEDURE: XR CHEST PA + LAT CHEST (CPT=71046)  COMPARISON: [facility], XR CHEST PA + LAT CHEST (CPT=71046), 9/07/2021, 11:40  INDICATIONS: Follow up for history of positive PPD. No current chest complaints. history of hypertension. TECHNIQUE:   Two views. FINDINGS: CARDIAC/VASC: No cardiac silhouette abnormality or cardiomegaly. Unremarkable pulmonary vasculature. MEDIAST/DIMITRIOS: Tortuous atherosclerotic aorta LUNGS/PLEURA: No significant pulmonary parenchymal abnormalities. No effusion or pleural thickening. BONES: Mild scoliosis with mild degenerative disc disease and spondylosis. OTHER: Bilateral shoulder arthropathy         CONCLUSION:  1. No acute cardiopulmonary disease 2.  No significant change    Dictated by (CST): Ria Guzman MD on 10/09/2023 at 11:48 AM     Finalized by (CST): Ria Guzman MD on 10/09/2023 at 11:49 AM             Lab Results   Component Value Date    WBC 8.6 10/09/2023    HGB 12.3 10/09/2023    .0 10/09/2023      Lab Results   Component Value Date    GLU 99 10/09/2023    BUN 18 10/09/2023    CREATSERUM 0.70 10/09/2023    GFRNAA 92 01/08/2022    GFRAA 106 01/08/2022        Assessment and Plan:  Diagnoses and all orders for this visit:    Tendinitis of right rotator cuff  -     Arthrocentesis aspiration and injection major Right joint bursa w/o US  -     triamcinolone acetonide (Kenalog-40) 40 MG/ML injection 40 mg  -     PHYSICAL THERAPY - INTERNAL    Subacromial impingement of right shoulder  -     Arthrocentesis aspiration and injection major Right joint bursa w/o US  -     triamcinolone acetonide (Kenalog-40) 40 MG/ML injection 40 mg  -     PHYSICAL THERAPY - INTERNAL    Nontraumatic incomplete tear of right rotator cuff  -     Arthrocentesis aspiration and injection major Right joint bursa w/o US  -     triamcinolone acetonide (Kenalog-40) 40 MG/ML injection 40 mg  -     PHYSICAL THERAPY - INTERNAL    Biceps tendinitis of right shoulder  -     Arthrocentesis aspiration and injection major Right joint bursa w/o US  -     triamcinolone acetonide (Kenalog-40) 40 MG/ML injection 40 mg  -     PHYSICAL THERAPY - INTERNAL    Superior glenoid labrum lesion of right shoulder, subsequent encounter  -     Arthrocentesis aspiration and injection major Right joint bursa w/o US  -     triamcinolone acetonide (Kenalog-40) 40 MG/ML injection 40 mg  -     PHYSICAL THERAPY - INTERNAL        Assessment: Return of symptoms to the right shoulder since October 2022. Plan: We talked about cortisone and therapy versus surgery. She wants to try another injection and therapy first.  Aspiration of the right shoulder yielded 10 cc normal joint fluid. It was shown to her and discarded. The right shoulder was injected with bupivacaine 0.5% 5 cc and Kenalog 1 cc without issue. She was told to watch her glucose levels. If they elevate over 300, she will call her PCP. She will do the home exercise program that she learns in therapy. If she is not improved in a month, she should call the office to have a new MRI ordered for the right shoulder without contrast.  She would then follow in office results. If she is improved, I will see her as needed. Follow Up: No follow-ups on file.     Ritchie Gottron, MD

## 2023-10-15 RX ORDER — AMLODIPINE BESYLATE 2.5 MG/1
2.5 TABLET ORAL 2 TIMES DAILY
Qty: 180 TABLET | Refills: 3 | Status: SHIPPED | OUTPATIENT
Start: 2023-10-15

## 2023-10-15 NOTE — TELEPHONE ENCOUNTER
Refill passed per [facility] protocol.   Requested Prescriptions   Pending Prescriptions Disp Refills    amLODIPine 2.5 MG Oral Tab 180 tablet 1     Sig: Take 1 tablet (2.5 mg total) by mouth in the morning and 1 tablet (2.5 mg total) before bedtime.       Hypertensive Medications Protocol Passed - 10/13/2023  4:35 PM        Passed - In person appointment in the past 12 or next 3 months     Recent Outpatient Visits              5 days ago Tendinitis of right rotator cuff    [facility], Tim Davenport MD    Office Visit    1 month ago Medicare annual wellness visit, subsequent    [facility], Minh Contreras MD    Office Visit    4 months ago Tendinitis of right rotator cuff    [facility], Tim Davenport MD    Office Visit    6 months ago Hypertension associated with type 2 diabetes mellitus (HCC)    [facility], Minh Contreras MD    Office Visit    10 months ago Nontraumatic incomplete tear of right rotator cuff    [facility], Tim Davenport MD    Office Visit                      Passed - Last BP reading less than 140/90     BP Readings from Last 1 Encounters:  10/10/23 : 139/73              Passed - CMP or BMP in past 6 months     Recent Results (from the past 4392 hour(s))   Comp Metabolic Panel (14)    Collection Time: 10/09/23 10:29 AM   Result Value Ref Range    Glucose 99 70 - 99 mg/dL    Sodium 138 136 - 145 mmol/L    Potassium 3.9 3.5 - 5.1 mmol/L    Chloride 106 98 - 112 mmol/L    CO2 29.0 21.0 - 32.0 mmol/L    Anion Gap 3 0 - 18 mmol/L    BUN 18 7 - 18 mg/dL    Creatinine 0.70 0.55 - 1.02 mg/dL    Calcium, Total 9.3 8.5 - 10.1 mg/dL    Calculated Osmolality 288 275 - 295 mOsm/kg    eGFR-Cr 92 >=60 mL/min/1.73m2    AST 14 (L) 15 - 37 U/L    ALT 33 13 - 56 U/L    Alkaline Phosphatase  77 55 - 142 U/L    Bilirubin, Total 0.5 0.1 - 2.0 mg/dL    Total Protein 7.5 6.4 - 8.2 g/dL    Albumin 4.1 3.4 - 5.0 g/dL    Globulin  3.4 2.8 - 4.4 g/dL    A/G Ratio 1.2 1.0 - 2.0    Patient Fasting for CMP? Yes      *Note: Due to a large number of results and/or encounters for the requested time period, some results have not been displayed. A complete set of results can be found in Results Review.               Passed - In person appointment or virtual visit in the past 6 months     Recent Outpatient Visits              5 days ago [facility] rot[facility]    [facility]    1 month ago Medicare annual wellness visit, subsequent    [facility], [place]Minh Núñez MD    Office Visit    4 months ago Tendinitis of right rotator cuff    [facility]    [facility]    6 months ago Hypertension associated with type 2 diabetes mellitus (HCC)    [facility]    [facility]    10 months ago Nontraumatic incomplete tear of right rotator cuff    [facility]                      Passed - EGFRCR or GFRNAA > 50     GFR Evaluation  EGFRCR: 92 , resulted on 10/9/2023

## 2023-11-04 ENCOUNTER — HOSPITAL ENCOUNTER (OUTPATIENT)
Age: 73
Discharge: HOME | End: 2023-11-04
Payer: MEDICARE

## 2023-11-04 VITALS
SYSTOLIC BLOOD PRESSURE: 120 MMHG | TEMPERATURE: 98 F | BODY MASS INDEX: 26.58 KG/M2 | HEIGHT: 63 IN | DIASTOLIC BLOOD PRESSURE: 60 MMHG | WEIGHT: 150 LBS | RESPIRATION RATE: 20 BRPM | HEART RATE: 80 BPM | OXYGEN SATURATION: 97 %

## 2023-11-04 DIAGNOSIS — U07.1 COVID-19: Primary | ICD-10-CM

## 2023-11-04 LAB — SARS-COV-2 RNA RESP QL NAA+PROBE: DETECTED

## 2023-11-04 PROCEDURE — 99213 OFFICE O/P EST LOW 20 MIN: CPT

## 2023-11-04 PROCEDURE — 99203 OFFICE O/P NEW LOW 30 MIN: CPT

## 2023-11-04 RX ORDER — BENZONATATE 100 MG/1
100 CAPSULE ORAL 3 TIMES DAILY PRN
Qty: 30 CAPSULE | Refills: 0 | Status: SHIPPED | OUTPATIENT
Start: 2023-11-04 | End: 2023-12-04

## 2023-11-04 NOTE — DISCHARGE INSTRUCTIONS
Tessalon as needed for cough up 3 times daily   Alternate Tylenol/Motrin every 3 hours as needed for pain or fever > 100.4 degrees   Drink plenty of fluids  Get plenty of rest    Wash hands often  Disinfect your environment  Do not share utensils or drinks    You may benefit from taking a decongestant (e.g. Sudafed)  You may benefit from taking a daily allergy medication (e.g. Zyrtec)  You may benefit from using a humidifier    Stay home and isolate from others in your home for 5 days (starting when your symptoms began)  Isolation can end after 5 days if you are fever-free (<100.4) for 24 hours and your symptoms are improving  Wear a mask any time you are around others for 10 days    Symptoms may take a few weeks to resolve  Follow up with your primary care provider

## 2023-11-06 ENCOUNTER — NURSE TRIAGE (OUTPATIENT)
Dept: INTERNAL MEDICINE CLINIC | Facility: CLINIC | Age: 73
End: 2023-11-06

## 2023-11-06 NOTE — TELEPHONE ENCOUNTER
Action Requested: Summary for Provider     []  Critical Lab, Recommendations Needed  [] Need Additional Advice  []   FYI    []   Need Orders  [] Need Medications Sent to Pharmacy  []  Other     SUMMARY: Per protocol disposition advised: home care. Advised patient to call back if rash does not clear up or if symptoms worsen. Advised patient she can also return to immediate care with any concerns, verbalizes understanding. Reason for call: Rash  Onset: 11/01/23    Patient reports feeling better regarding Covid infection, was seen at immediate care Saturday. She is still coughing, but treatment is helping. CDC isolation guidelines reviewed and sent via [address] Advised patient to go to emergency room with difficulty breathing or chest pain and to call the office with any questions, verbalizes understanding. Patient noticed some redness around neck about 4-inch-wide area. Just redness, no blisters or raised bumps.   Noticed it first 11/01/23, 5 days ago   Redness around neck is fading in the front  No fever  Treatment: cortisone 10 with some relief    Reason for Disposition   Mild localized rash    Protocols used: Rash or Redness - [facility]

## 2023-11-13 ENCOUNTER — NURSE TRIAGE (OUTPATIENT)
Dept: INTERNAL MEDICINE CLINIC | Facility: CLINIC | Age: 73
End: 2023-11-13

## 2023-11-13 DIAGNOSIS — U07.1 COVID-19 VIRUS INFECTION: Primary | ICD-10-CM

## 2023-11-13 RX ORDER — FLUTICASONE PROPIONATE 110 UG/1
2 AEROSOL, METERED RESPIRATORY (INHALATION) 2 TIMES DAILY
Qty: 1 EACH | Refills: 0 | Status: SHIPPED | OUTPATIENT
Start: 2023-11-13 | End: 2024-11-07

## 2023-11-13 NOTE — TELEPHONE ENCOUNTER
Would recommend to get cxr today;  no abx needed for covid unless cxr shows having pneumonia.    [id number] Ivon Nuno for steroid fluticasone inhaler which I already sent to her pharmacy;

## 2023-11-13 NOTE — TELEPHONE ENCOUNTER
Spoke to patient and relayed Dr. Amilcar Grimes message below. Patient verbalized understanding. She has an appointment scheduled for tomorrow.      Future Appointments   Date Time Provider Zachariah Landeros   11/14/2023 10:00 AM ADO XR RM1 ADO DIAG GUADALUPEG AMAURY Hughes

## 2023-11-13 NOTE — TELEPHONE ENCOUNTER
Condition update:  Patient called (identified name and ),   Diagnosed with Covid 2023. Given benzonatate. Did not get Paxlovid. Reports still having cough, throat is dry. Had some yellow phlegm. She is asking for a fluticasone inhaler. Asking if she should get antibiotic? She is a home health nurse. Dr Arlene Sheriff, please advise? She does not want anything with Tylenol in it over concern for kidneys. 2023  MDM:  DDx includes COVID versus viral URI versus bronchitis versus other. Patient is overall very well-appearing with stable vitals and tolerating oral intake. No hypoxia or signs of respiratory distress. COVID test positive. Given patient's past medical history including diabetes and HTN, she does qualify for antiviral Paxlovid treatment for COVID-19. Discussed risk versus benefits and medication side effects and interactions with patient. Patient declines treatment with Paxlovid at this time. Discussed supportive care including rest, increased fluid intake, and OTC Tylenol/Motrin as needed for pain or fever. Rx Tessalon for cough as needed. Discussed quarantine guidelines and infection control. Instructed patient to go to nearest ER with any worsening or concerning symptoms. Follow-up with PCP. Work note provided.

## 2023-11-14 ENCOUNTER — HOSPITAL ENCOUNTER (OUTPATIENT)
Dept: GENERAL RADIOLOGY | Age: 73
Discharge: HOME | End: 2023-11-14
Attending: INTERNAL MEDICINE
Payer: MEDICARE

## 2023-11-14 DIAGNOSIS — U07.1 COVID-19 VIRUS INFECTION: ICD-10-CM

## 2023-11-14 PROCEDURE — 71046 X-RAY EXAM CHEST 2 VIEWS: CPT | Performed by: INTERNAL MEDICINE

## 2023-11-14 NOTE — TELEPHONE ENCOUNTER
Pharmacy requesting a 90 day supply per pt insurance      fluticasone propionate (FLOVENT HFA) 110 MCG/ACT Inhalation Aerosol, Inhale 2 puffs into the lungs 2 (two) times daily. , Disp: 1 each, Rfl: 0

## 2023-11-15 RX ORDER — FLUTICASONE PROPIONATE 110 UG/1
2 AEROSOL, METERED RESPIRATORY (INHALATION) 2 TIMES DAILY
Qty: 3 EACH | Refills: 0 | OUTPATIENT
Start: 2023-11-15 | End: 2024-11-09

## 2023-11-26 RX ORDER — LOSARTAN POTASSIUM 50 MG/1
50 TABLET ORAL 2 TIMES DAILY
Qty: 180 TABLET | Refills: 3 | Status: SHIPPED | OUTPATIENT
Start: 2023-11-26

## 2023-11-26 NOTE — TELEPHONE ENCOUNTER
Refill passed per [facility] protocol.     Requested Prescriptions   Pending Prescriptions Disp Refills    LOSARTAN 50 MG Oral Tab [Pharmacy Med Name: LOSARTAN 50MG TABLETS] 180 tablet 1     Sig: TAKE 1 TABLET(50 MG) BY MOUTH TWICE DAILY       Hypertensive Medications Protocol Passed - 11/25/2023  3:13 PM        Passed - In person appointment in the past 12 or next 3 months     Recent Outpatient Visits              3 weeks ago     [facility], [place], Ninel, MD    Office Visit    1 month ago Tendinitis of right rotator cuff    [facility], Tim Davenport MD    Office Visit    2 months ago Medicare annual wellness visit, subsequent    [facility], Minh Contrears MD    Office Visit    6 months ago Tendinitis of right rotator cuff    [facility], Tim Davenport MD    Office Visit    8 months ago Hypertension associated with type 2 diabetes mellitus (HCC)    [facility], Minh Contreras MD    Office Visit                      Passed - Last BP reading less than 140/90     BP Readings from Last 1 Encounters:   11/04/23 120/60               Passed - CMP or BMP in past 6 months     Recent Results (from the past 4392 hour(s))   Comp Metabolic Panel (14)    Collection Time: 10/09/23 10:29 AM   Result Value Ref Range    Glucose 99 70 - 99 mg/dL    Sodium 138 136 - 145 mmol/L    Potassium 3.9 3.5 - 5.1 mmol/L    Chloride 106 98 - 112 mmol/L    CO2 29.0 21.0 - 32.0 mmol/L    Anion Gap 3 0 - 18 mmol/L    BUN 18 7 - 18 mg/dL    Creatinine 0.70 0.55 - 1.02 mg/dL    Calcium, Total 9.3 8.5 - 10.1 mg/dL    Calculated Osmolality 288 275 - 295 mOsm/kg    eGFR-Cr 92 >=60 mL/min/1.73m2    AST 14 (L) 15 - 37 U/L    ALT 33 13 - 56 U/L    Alkaline Phosphatase 77 55 - 142 U/L    Bilirubin, Total 0.5 0.1 - 2.0 mg/dL    Total  Protein 7.5 6.4 - 8.2 g/dL    Albumin 4.1 3.4 - 5.0 g/dL    Globulin  3.4 2.8 - 4.4 g/dL    A/G Ratio 1.2 1.0 - 2.0    Patient Fasting for CMP? Yes      *Note: Due to a large number of results and/or encounters for the requested time period, some results have not been displayed. A complete set of results can be found in Results Review.               Passed - In person appointment or virtual visit in the past 6 months     Recent Outpatient Visits              3 weeks ago     [facility] [place], Ninel, MD    Office Visit    1 month ago Tendinitis of right rotator cuff    [facility]    [facility]    2 months ago Medicare annual wellness visit, subsequent    [facility]    Office Visit    6 months ago Tendinitis of right rotator cuff    [facility]    [facility] Visit    8 months ago Hypertension associated with type 2 diabetes mellitus (HCC)    [facility]    [facility]                      Passed - EGFRCR or GFRNAA > 50     GFR Evaluation  EGFRCR: 92 , resulted on 10/9/2023

## 2023-11-28 ENCOUNTER — TELEPHONE (OUTPATIENT)
Dept: INTERNAL MEDICINE CLINIC | Facility: CLINIC | Age: 73
End: 2023-11-28

## 2023-11-28 NOTE — TELEPHONE ENCOUNTER
First Call attempt. Left detailed message - okay per DEBORAH. Advised to call back regarding response on [employer] [facility]. Office phone number provided with office hours. Advised check [employer] message .

## 2023-11-28 NOTE — TELEPHONE ENCOUNTER
Spoke with patient, (  Name and  verified ) informed of Dr. Ana Barry  instructions below    Patient does not  want refill of Benzonatate  ( states it does not  work )  but prefers to have refill of Montelukast      Allergies reviewed and pharmacy confirmed    Medication pended for your review / approval

## 2023-11-28 NOTE — TELEPHONE ENCOUNTER
Patient contacts clinic reporting ongoing cough. Seen in IC , CXR completed . Feels symptoms are over all improved but cough remains. Occasional sputum production. Denies fever, body aches or chills, chest pain or shortness of breath. Denies dizziness or lightheadedness. Using flovent as prescribed. Attempted to refill montelukast but was  at pharmacy. Patient inquires on next steps. Dr. Patricia Rios please advise.

## 2023-11-28 NOTE — TELEPHONE ENCOUNTER
Coughing from viral bronchitis like covid may occasional last as much as 6 week or more due to hypersensitivity of the airway. She is already using flovent inhaler which I would continue; we can refill her tessalon perles for the cough . ,

## 2023-11-29 RX ORDER — MONTELUKAST SODIUM 10 MG/1
10 TABLET ORAL NIGHTLY
Qty: 30 TABLET | Refills: 3 | Status: SHIPPED | OUTPATIENT
Start: 2023-11-29

## 2023-11-30 DIAGNOSIS — M75.41 SUBACROMIAL IMPINGEMENT OF RIGHT SHOULDER: ICD-10-CM

## 2023-12-01 RX ORDER — HYDROCHLOROTHIAZIDE 12.5 MG/1
12.5 TABLET ORAL 2 TIMES DAILY
Qty: 180 TABLET | Refills: 3 | Status: SHIPPED | OUTPATIENT
Start: 2023-12-01

## 2023-12-01 RX ORDER — MELOXICAM 7.5 MG/1
7.5 TABLET ORAL
Qty: 90 TABLET | Refills: 1 | Status: SHIPPED | OUTPATIENT
Start: 2023-12-01

## 2023-12-01 NOTE — TELEPHONE ENCOUNTER
Refill passed per [facility], [facility] protocol.   Requested Prescriptions   Pending Prescriptions Disp Refills    MELOXICAM 7.5 MG Oral Tab [Pharmacy Med Name: MELOXICAM 7.5MG TABLETS] 90 tablet 1     Sig: TAKE 1 TABLET(7.5 MG) BY MOUTH DAILY AS NEEDED FOR PAIN       Non-Narcotic Pain Medication Protocol Passed - 11/30/2023  5:53 AM        Passed - In person appointment or virtual visit in the past 6 mos or appointment in next 3 mos     Recent Outpatient Visits              3 weeks ago     [address], [address] [facility], Raúl Caputo MD    Office Visit    1 month ago Tendinitis of right rotator cuff    [address] [facility], Judith Camacho MD    Office Visit    2 months ago [employer] annual wellness visit, subsequent    [address], [facility], Heidy Crews MD    Office Visit    6 months ago Tendinitis of right rotator cuff    [address], [address] [facility],[address], Judith Camacho MD    Office Visit    8 months ago Hypertension associated with type 2 diabetes mellitus [facility])    [address] [facility] Heidy Crews [facility]    Office Visit                        HYDROCHLOROTHIAZIDE 12.5 MG Oral Tab [Pharmacy Med Name: HYDROCHLOROTHIAZIDE 12.5MG TABLETS] 180 tablet 1     Sig: TAKE 1 TABLET(12.5 MG) BY MOUTH TWICE DAILY       Hypertensive Medications Protocol Passed - 11/30/2023  5:53 AM        Passed - In person appointment in the past 12 or next 3 months     Recent Outpatient Visits              3 weeks ago     [address], [address] [facility], Maricruz Mack MD    Office Visit    1 month ago Tendinitis of right rotator cuff    [address] [facility], Judith Camacho MD    Office Visit    2 months ago [employer] annual wellness visit, subsequent    [address], Heidy Crews MD    Office Visit 6 months ago Tendinitis of right rotator cuff    [facility], [address], Lexi Dickson MD    Office Visit    8 months ago Hypertension associated with type 2 diabetes mellitus [facility])    [address], [facility], Saul Jara MD    Office Visit                      Passed - Last BP reading less than 140/90     BP Readings from Last 1 Encounters:   11/04/23 120/60               Passed - CMP or BMP in past 6 months     Recent Results (from the past 4392 hour(s))   Comp Metabolic Panel (14)    Collection Time: 10/09/23 10:29 AM   Result Value Ref Range    Glucose 99 70 - 99 mg/dL    Sodium 138 136 - 145 mmol/L    Potassium 3.9 3.5 - 5.1 mmol/L    Chloride 106 98 - 112 mmol/L    CO2 29.0 21.0 - 32.0 mmol/L    Anion Gap 3 0 - 18 mmol/L    BUN 18 7 - 18 mg/dL    Creatinine 0.70 0.55 - 1.02 mg/dL    Calcium, Total 9.3 8.5 - 10.1 mg/dL    Calculated Osmolality 288 275 - 295 mOsm/kg    eGFR-Cr 92 >=60 mL/min/1.73m2    AST 14 (L) 15 - 37 U/L    ALT 33 13 - 56 U/L    Alkaline Phosphatase 77 55 - 142 U/L    Bilirubin, Total 0.5 0.1 - 2.0 mg/dL    Total Protein 7.5 6.4 - 8.2 g/dL    Albumin 4.1 3.4 - 5.0 g/dL    Globulin  3.4 2.8 - 4.4 g/dL    A/G Ratio 1.2 1.0 - 2.0    Patient Fasting for CMP? Yes      *Note: Due to a large number of results and/or encounters for the requested time period, some results have not been displayed. A complete set of results can be found in Results Review.                Passed - In person appointment or virtual visit in the past 6 months     Recent Outpatient Visits              3 weeks ago     [address], 12 [facility], Robyn Harding MD    Office Visit    1 month ago Tendinitis of right rotator cuff    Lexi Dia MD    Office Visit    2 months ago [employer] annual wellness visit, subsequent    Saul Dia Hari Juarez MD    Office Visit    6 months ago Tendinitis of right rotator cuff    Sera Garibay MD    Office Visit    8 months ago Hypertension associated with type 2 diabetes mellitus [facility])    Jimi Garibay MD    Office Visit                      Passed - [facility] or GFRNAA > 50     GFR Evaluation  EGFRCR: 92 , resulted on 10/9/2023             Recent Outpatient Visits              3 weeks ago     [address], [facility], Oren Warren MD    Office Visit    1 month ago Tendinitis of right rotator cuff    Sera Garibay MD    Office Visit    2 months ago [employer] annual wellness visit, subsequent    Jimi Garibay MD    Office Visit    6 months ago Tendinitis of right rotator cuff    Sera Garibay MD    Office Visit    8 months ago Hypertension associated with type 2 diabetes mellitus [facility])    Jimi Garibay MD    Office Visit

## 2024-01-24 ENCOUNTER — OFFICE VISIT (OUTPATIENT)
Dept: INTERNAL MEDICINE CLINIC | Facility: CLINIC | Age: 74
End: 2024-01-24

## 2024-01-24 VITALS
OXYGEN SATURATION: 97 % | HEIGHT: 63 IN | TEMPERATURE: 99 F | DIASTOLIC BLOOD PRESSURE: 72 MMHG | WEIGHT: 154.81 LBS | HEART RATE: 76 BPM | SYSTOLIC BLOOD PRESSURE: 124 MMHG | BODY MASS INDEX: 27.43 KG/M2

## 2024-01-24 DIAGNOSIS — M75.21 BICEPS TENDINITIS OF RIGHT UPPER EXTREMITY: ICD-10-CM

## 2024-01-24 DIAGNOSIS — M75.82 ROTATOR CUFF TENDONITIS, LEFT: Primary | ICD-10-CM

## 2024-01-24 PROCEDURE — 3074F SYST BP LT 130 MM HG: CPT | Performed by: INTERNAL MEDICINE

## 2024-01-24 PROCEDURE — 3078F DIAST BP <80 MM HG: CPT | Performed by: INTERNAL MEDICINE

## 2024-01-24 PROCEDURE — 1125F AMNT PAIN NOTED PAIN PRSNT: CPT | Performed by: INTERNAL MEDICINE

## 2024-01-24 PROCEDURE — 1159F MED LIST DOCD IN RCRD: CPT | Performed by: INTERNAL MEDICINE

## 2024-01-24 PROCEDURE — 99214 OFFICE O/P EST MOD 30 MIN: CPT | Performed by: INTERNAL MEDICINE

## 2024-01-24 PROCEDURE — 3008F BODY MASS INDEX DOCD: CPT | Performed by: INTERNAL MEDICINE

## 2024-01-24 PROCEDURE — 1160F RVW MEDS BY RX/DR IN RCRD: CPT | Performed by: INTERNAL MEDICINE

## 2024-01-24 RX ORDER — GLIPIZIDE 2.5 MG/1
2.5 TABLET ORAL DAILY
Qty: 30 TABLET | Refills: 0 | Status: SHIPPED | OUTPATIENT
Start: 2024-01-24

## 2024-01-24 RX ORDER — METHYLPREDNISOLONE 4 MG/1
TABLET ORAL
Qty: 1 EACH | Refills: 0 | Status: SHIPPED | OUTPATIENT
Start: 2024-01-24

## 2024-01-24 NOTE — PROGRESS NOTES
Subjective:     Patient ID: Beatriz Rosario is a 73 year old female.    Shoulder Pain   The pain is present in the right shoulder. This is a recurrent problem. The current episode started more than 1 year ago. There has been no history of extremity trauma. The problem occurs intermittently. The problem has been waxing and waning. The pain is moderate. Associated symptoms include a limited range of motion and stiffness. Pertinent negatives include no joint locking or joint swelling. Exacerbated by: rom of the right shoulder. Treatments tried: pt had been getting cortisone shot few months ago. The treatment provided moderate relief. Her past medical history is significant for diabetes and osteoarthritis. There is no history of rheumatoid arthritis.       History/Other:   Review of Systems   Constitutional: Negative.    Musculoskeletal:  Positive for stiffness.        Right shoulder pain     Current Outpatient Medications   Medication Sig Dispense Refill    Meloxicam 7.5 MG Oral Tab Take 1 tablet (7.5 mg total) by mouth daily as needed for Pain. 90 tablet 1    hydroCHLOROthiazide 12.5 MG Oral Tab Take 1 tablet (12.5 mg total) by mouth 2 (two) times daily. 180 tablet 3    montelukast 10 MG Oral Tab Take 1 tablet (10 mg total) by mouth nightly. 30 tablet 3    losartan 50 MG Oral Tab Take 1 tablet (50 mg total) by mouth 2 (two) times daily. 180 tablet 3    fluticasone propionate (FLOVENT HFA) 110 MCG/ACT Inhalation Aerosol Inhale 2 puffs into the lungs 2 (two) times daily. 1 each 0    amLODIPine 2.5 MG Oral Tab Take 1 tablet (2.5 mg total) by mouth in the morning and 1 tablet (2.5 mg total) before bedtime. 180 tablet 3    atorvastatin 40 MG Oral Tab Take 1 tablet (40 mg total) by mouth nightly. 90 tablet 3    ibuprofen 200 MG Oral Tab Take 0.5 tablets (100 mg total) by mouth as needed for Pain.      PANTOPRAZOLE SODIUM 40 MG Oral Tab EC TAKE 1 TABLET(40 MG) BY MOUTH EVERY MORNING BEFORE BREAKFAST 90 tablet 0     latanoprost (XALATAN) 0.005 % Ophthalmic Solution   3    Calcium Carbonate-Vitamin D 600-200 MG-UNIT Oral Cap Take  by mouth. 1 po q12hrs.      aspirin 81 MG Oral Tab EC Take  by mouth. take 1 tablet (81MG)  by ORAL route  every day      Multiple Vitamin (MULTI-DAY) Oral Tab Take  by mouth.      Blood Glucose Monitoring Suppl (TRUE METRIX AIR GLUCOSE METER) w/Device Does not apply Kit Use to test Blood sugar daily 1 kit 0    Glucose Blood (TRUE METRIX BLOOD GLUCOSE TEST) In Vitro Strip 1 each by In Vitro route daily. 100 strip 0    Blood Glucose Monitoring Suppl (ACCU-CHEK GLADIS PLUS) w/Device Does not apply Kit Use to check glucose daily 1 kit 0    Glucose Blood (ACCU-CHEK GLADIS PLUS) In Vitro Strip 1 strip by Finger stick route daily. 100 strip 1    Accu-Chek Softclix Lancets Does not apply Misc 1 lancet by Finger stick route daily. 100 each 1     Allergies:  Allergies   Allergen Reactions    Latex        Past Medical History:   Diagnosis Date    Allergic rhinitis 3/15/2011    Cataracts, bilateral     Diabetes (HCC)     Diabetes mellitus, type II (HCC) 2012    Ductal carcinoma in situ of right breast 2010    Endometrial thickening on ultra sound 8/11/2014    Glaucoma, right eye     Lumbar arthropathy 6/12/2018    Osteoarthritis     Other and unspecified hyperlipidemia     Positive PPD 2005    S/P INH prophylaxis for 6 months     Unspecified essential hypertension     Vitamin B12 deficiency anemia 1/3/2008    Vitamin D deficiency 10/31/2011      Past Surgical History:   Procedure Laterality Date    BIOPSY OF BREAST, NEEDLE CORE Bilateral 2011    BREAST BIOPSY Right 2001    [facility]    COLONOSCOPY  2008    COLONOSCOPY  06/2021    COLONOSCOPY N/A 06/25/2021    Procedure: COLONOSCOPY,;  Surgeon: Dirk Robbins MD;  [facility]    HYSTERECTOMY  2016    LAPAROSCOPY,VAG HYSTERECTOMY  09/26/2016    w/ BSO, benign    LUMPECTOMY RIGHT  08/06/2010    NEEDLE BIOPSY LEFT Left 08/12/2011     RADIATION RIGHT  2011    breast      Family History   Problem Relation Age of Onset    Stroke Father 84    Hypertension Father 82        myocardial infarction    Heart Disease Father 82        CAD    Heart Disorder Father     Heart Disease Mother 77        CAD    Hypertension Mother         mycardial infarction    Musculo-skelatal Disorder Mother         osteoporosis    Heart Disorder Mother     Breast Cancer Sister 50        chemo; doing fine    Cancer Sister         breast cancer    Breast Cancer Maternal Aunt         post    Breast Cancer Self 59    DCIS Self 60      Social History:   Social History     Socioeconomic History    Marital status:     Number of children: 0   Occupational History    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     Employer: [employer]   Tobacco Use    Smoking status: Never     Passive exposure: Never    Smokeless tobacco: Never   Vaping Use    Vaping Use: Never used   Substance and Sexual Activity    Alcohol use: No     Alcohol/week: 0.0 standard drinks of alcohol    Drug use: No   Other Topics Concern    Caffeine Concern Yes     Comment: 2 cups of coffee per day         Objective:   Physical Exam  Constitutional:       General: She is not in acute distress.     Appearance: She is not ill-appearing, toxic-appearing or diaphoretic.   Musculoskeletal:      Right shoulder: Tenderness present. No swelling, deformity, effusion, laceration or bony tenderness. Decreased range of motion.      Left shoulder: Normal.   Neurological:      Mental Status: She is alert.         Assessment & Plan:   (M75.82) Rotator cuff tendonitis, left  (primary encounter diagnosis)  Plan: pt doesn't want nsaids for her concern it can affect renal function specially that she is diabetic.  She just had cortisone shot given 3mos ago by ortho. She had asked if she try short course of oral steroid which is also anti inflammatory instead of getting another cortisone shot. I told her that  we can try medrol dospak but told her to watch her sugars which can go up (pt given glipizide just in case glucose goes up).     (M75.21) Biceps tendinitis of right upper extremity  Plan:see above      Pt had also asked if she can get disabled parking but she has no diagnosis to support such.   No orders of the defined types were placed in this encounter.      Meds This Visit:  Requested Prescriptions      No prescriptions requested or ordered in this encounter       Imaging & Referrals:  None

## 2024-01-25 NOTE — TELEPHONE ENCOUNTER
Patient requesting a refill of:  Blood glucose machine, lancets, needles and pens     [facility]:  [id number] - [place], [place]

## 2024-01-26 RX ORDER — LANCETS 30 GAUGE
EACH MISCELLANEOUS
Qty: 100 EACH | Refills: 11 | Status: SHIPPED | OUTPATIENT
Start: 2024-01-26

## 2024-01-26 RX ORDER — CALCIUM CITRATE/VITAMIN D3 200MG-6.25
1 TABLET ORAL DAILY
Qty: 100 STRIP | Refills: 11 | Status: SHIPPED | OUTPATIENT
Start: 2024-01-26

## 2024-01-26 NOTE — TELEPHONE ENCOUNTER
Refill Passed Per Protocol    Requested Prescriptions   Pending Prescriptions Disp Refills    Glucose Blood (TRUE METRIX BLOOD GLUCOSE TEST) In Vitro Strip 100 strip 11     Si strip by In Vitro route daily.       Diabetic Supplies Protocol Passed - 2024  1:26 PM        Passed - In person appointment or virtual visit in the past 12 mos or appointment in next 3 mos     Recent Outpatient Visits              2 days ago Rotator cuff tendonitis, left    [facility], Minh Contreras MD    Office Visit    2 months ago     [facility] [place], Ninel, MD    Office Visit    3 months ago Tendinitis of right rotator cuff    [facility], Tim Davenport MD    Office Visit    4 months ago Medicare annual wellness visit, subsequent    [facility] Minh Contreras MD    Office Visit    8 months ago Tendinitis of right rotator cuff    [facility], Tim Davenport MD    Office Visit                        Lancets Does not apply Misc 100 each 11     Sig: Test once daily       Diabetic Supplies Protocol Passed - 2024  1:26 PM        Passed - In person appointment or virtual visit in the past 12 mos or appointment in next 3 mos     Recent Outpatient Visits              2 days ago Rotator cuff tendonitis, left    [facility], Minh Contreras MD    Office Visit    2 months ago     [facility] [place], Ninel, MD    Office Visit    3 months ago Tendinitis of right rotator cuff    [facility], Tim Davenport MD    Office Visit    4 months ago Medicare annual wellness visit, subsequent    [facility] Minh Contreras MD    Office Visit    8 months ago  Tendinitis of right rotator cuff    [facility], Tim Davenport MD    Office Visit                             Recent Outpatient Visits              2 days ago Rotator cuff tendonitis, left    [facility], Minh Contreras MD    Office Visit    2 months ago     [facility], [place], Ninel, MD    Office Visit    3 months ago Tendinitis of right rotator cuff    [facility], Tim Davenport MD    Office Visit    4 months ago Medicare annual wellness visit, subsequent    [facility], Minh Contreras MD    Office Visit    8 months ago Tendinitis of right rotator cuff    [facility], Tim Davenport MD    Office Visit

## 2024-02-28 RX ORDER — GLIPIZIDE 2.5 MG/1
2.5 TABLET, EXTENDED RELEASE ORAL DAILY
Qty: 90 TABLET | Refills: 3 | Status: SHIPPED | OUTPATIENT
Start: 2024-02-28

## 2024-02-28 NOTE — TELEPHONE ENCOUNTER
Refill passed per [facility] protocol.  Requested Prescriptions   Pending Prescriptions Disp Refills    GLIPIZIDE ER 2.5 MG Oral Tablet 24 Hr [Pharmacy Med Name: GLIPIZIDE ER 2.5MG TABLETS] 90 tablet 0     Sig: TAKE 1 TABLET BY MOUTH DAILY       Diabetes Medication Protocol Passed - 2/28/2024  5:50 AM        Passed - Last A1C < 7.5 and within past 6 months     Lab Results   Component Value Date    A1C 6.6 (A) 09/05/2023             Passed - In person appointment or virtual visit in the past 6 mos or appointment in next 3 mos     Recent Outpatient Visits              1 month ago Rotator cuff tendonitis, left    [facility], Minh Contreras MD    Office Visit    3 months ago     [facility] [place], Ninel, MD    Office Visit    4 months ago Tendinitis of right rotator cuff    [facility], Tim Davenport MD    Office Visit    5 months ago Medicare annual wellness visit, subsequent    [facility], Minh Contreras MD    Office Visit    9 months ago Tendinitis of right rotator cuff    [facility], Tim Davenport MD    Office Visit                      Passed - Microalbumin procedure in past 12 months or taking ACE/ARB        Passed - EGFRCR or GFRNAA > 50     GFR Evaluation  EGFRCR: 92 , resulted on 10/9/2023          Passed - GFR in the past 12 months           Recent Outpatient Visits              1 month ago Rotator cuff tendonitis, left    [facility], Minh Contreras MD    Office Visit    3 months ago     [facility], [place], Ninel, MD    Office Visit    4 months ago Tendinitis of right rotator cuff    [facility], Tim Davenport MD    Office Visit    5 months ago Medicare  annual wellness visit, subsequent    [facility] [address], Minh Contreras MD    Office Visit    9 months ago Tendinitis of right rotator cuff    [facility], Tim Davenport MD    Office Visit

## 2024-03-29 ENCOUNTER — TELEPHONE (OUTPATIENT)
Dept: INTERNAL MEDICINE CLINIC | Facility: CLINIC | Age: 74
End: 2024-03-29

## 2024-03-29 NOTE — TELEPHONE ENCOUNTER
Unable to reach patient for medication adherence consult. LVM for patient to call me back at [phone].

## 2024-04-01 ENCOUNTER — OFFICE VISIT (OUTPATIENT)
Dept: ORTHOPEDICS CLINIC | Facility: CLINIC | Age: 74
End: 2024-04-01
Payer: MEDICARE

## 2024-04-01 ENCOUNTER — HOSPITAL ENCOUNTER (OUTPATIENT)
Dept: GENERAL RADIOLOGY | Facility: HOSPITAL | Age: 74
Discharge: HOME | End: 2024-04-01
Attending: ORTHOPAEDIC SURGERY
Payer: MEDICARE

## 2024-04-01 VITALS — SYSTOLIC BLOOD PRESSURE: 115 MMHG | DIASTOLIC BLOOD PRESSURE: 64 MMHG | HEART RATE: 68 BPM

## 2024-04-01 DIAGNOSIS — M75.21 BICEPS TENDINITIS OF RIGHT SHOULDER: ICD-10-CM

## 2024-04-01 DIAGNOSIS — M75.81 TENDINITIS OF RIGHT ROTATOR CUFF: Primary | ICD-10-CM

## 2024-04-01 DIAGNOSIS — R52 PAIN: ICD-10-CM

## 2024-04-01 DIAGNOSIS — M75.41 SUBACROMIAL IMPINGEMENT OF RIGHT SHOULDER: ICD-10-CM

## 2024-04-01 DIAGNOSIS — M75.111 NONTRAUMATIC INCOMPLETE TEAR OF RIGHT ROTATOR CUFF: ICD-10-CM

## 2024-04-01 DIAGNOSIS — S43.431D SUPERIOR GLENOID LABRUM LESION OF RIGHT SHOULDER, SUBSEQUENT ENCOUNTER: ICD-10-CM

## 2024-04-01 PROCEDURE — 73030 X-RAY EXAM OF SHOULDER: CPT | Performed by: ORTHOPAEDIC SURGERY

## 2024-04-01 RX ORDER — TRIAMCINOLONE ACETONIDE 40 MG/ML
40 INJECTION, SUSPENSION INTRA-ARTICULAR; INTRAMUSCULAR ONCE
Status: COMPLETED | OUTPATIENT
Start: 2024-04-01 | End: 2024-04-01

## 2024-04-01 RX ADMIN — TRIAMCINOLONE ACETONIDE 40 MG: 40 INJECTION, SUSPENSION INTRA-ARTICULAR; INTRAMUSCULAR at 16:41:00

## 2024-04-01 NOTE — PROGRESS NOTES
NURSING INTAKE COMMENTS:   Chief Complaint   Patient presents with    Shoulder Pain     R shoulder f/u- Pt states cortisone given on 10/10/23 gave 3 months of relief. Rates pain 4/10 -pain is worse trying to sleep ansd with movement  . Pain radiates to bicep        HPI: This 73 year old female presents today with recurrent right shoulder pain and decreased motion.  She concedes that she is not as aggressive as she should be with her home program.  She gets pretty good relief from cortisone.  It keeps wearing off however.  MRI in 2022 showed partial-thickness rotator cuff tear and other problems.  At this time I told her we can do another cortisone injection but I want her to do formal therapy and an MRI.  She agreed to all the above.    Her medical history is the same.  She denies numbness and tingling.    Past Medical History:   Diagnosis Date    Allergic rhinitis 3/15/2011    Cataracts, bilateral     Diabetes (HCC)     Diabetes mellitus, type II (HCC) 2012    Ductal carcinoma in situ of right breast 2010    Endometrial thickening on ultra sound 8/11/2014    Glaucoma, right eye     Lumbar arthropathy 6/12/2018    Osteoarthritis     Other and unspecified hyperlipidemia     Positive PPD 2005    S/P INH prophylaxis for 6 months     Unspecified essential hypertension     Vitamin B12 deficiency anemia 1/3/2008    Vitamin D deficiency 10/31/2011     Past Surgical History:   Procedure Laterality Date    BIOPSY OF BREAST, NEEDLE CORE Bilateral 2011    BREAST BIOPSY Right 2001    [facility]    COLONOSCOPY  2008    COLONOSCOPY  06/2021    COLONOSCOPY N/A 06/25/2021    Procedure: COLONOSCOPY,;  Surgeon: Dirk Robbins MD;  [facility]    HYSTERECTOMY  2016    LAPAROSCOPY,VAG HYSTERECTOMY  09/26/2016    w/ BSO, benign    LUMPECTOMY RIGHT  08/06/2010    NEEDLE BIOPSY LEFT Left 08/12/2011    RADIATION RIGHT  2011    breast     Current Outpatient Medications   Medication Sig Dispense Refill    glipiZIDE  ER 2.5 MG Oral Tablet 24 Hr Take 1 tablet (2.5 mg total) by mouth daily. 90 tablet 3    Glucose Blood (TRUE METRIX BLOOD GLUCOSE TEST) In Vitro Strip 1 strip by In Vitro route daily. 100 strip 11    Lancets Does not apply Misc Test once daily 100 each 11    methylPREDNISolone (MEDROL) 4 MG Oral Tablet Therapy Pack As directed. 1 each 0    Meloxicam 7.5 MG Oral Tab Take 1 tablet (7.5 mg total) by mouth daily as needed for Pain. 90 tablet 1    hydroCHLOROthiazide 12.5 MG Oral Tab Take 1 tablet (12.5 mg total) by mouth 2 (two) times daily. 180 tablet 3    montelukast 10 MG Oral Tab Take 1 tablet (10 mg total) by mouth nightly. 30 tablet 3    losartan 50 MG Oral Tab Take 1 tablet (50 mg total) by mouth 2 (two) times daily. 180 tablet 3    fluticasone propionate (FLOVENT HFA) 110 MCG/ACT Inhalation Aerosol Inhale 2 puffs into the lungs 2 (two) times daily. 1 each 0    amLODIPine 2.5 MG Oral Tab Take 1 tablet (2.5 mg total) by mouth in the morning and 1 tablet (2.5 mg total) before bedtime. 180 tablet 3    atorvastatin 40 MG Oral Tab Take 1 tablet (40 mg total) by mouth nightly. 90 tablet 3    Blood Glucose Monitoring Suppl (TRUE METRIX AIR GLUCOSE METER) w/Device Does not apply Kit Use to test Blood sugar daily 1 kit 0    ibuprofen 200 MG Oral Tab Take 0.5 tablets (100 mg total) by mouth as needed for Pain.      PANTOPRAZOLE SODIUM 40 MG Oral Tab EC TAKE 1 TABLET(40 MG) BY MOUTH EVERY MORNING BEFORE BREAKFAST 90 tablet 0    latanoprost (XALATAN) 0.005 % Ophthalmic Solution   3    Calcium Carbonate-Vitamin D 600-200 MG-UNIT Oral Cap Take  by mouth. 1 po q12hrs.      aspirin 81 MG Oral Tab EC Take  by mouth. take 1 tablet (81MG)  by ORAL route  every day      Multiple Vitamin (MULTI-DAY) Oral Tab Take  by mouth.       Allergies   Allergen Reactions    Latex      Family History   Problem Relation Age of Onset    Stroke Father 84    Hypertension Father 82        myocardial infarction    Heart Disease Father 82        CAD     Heart Disorder Father     Heart Disease Mother 77        CAD    Hypertension Mother         mycardial infarction    Musculo-skelatal Disorder Mother         osteoporosis    Heart Disorder Mother     Breast Cancer Sister 50        chemo; doing fine    Cancer Sister         breast cancer    Breast Cancer Maternal Aunt         post    Breast Cancer Self 59    DCIS Self 59     No family Hx of DVT/PE    Social History     Occupational History    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     Employer: [employer]   Tobacco Use    Smoking status: Never     Passive exposure: Never    Smokeless tobacco: Never   Vaping Use    Vaping Use: Never used   Substance and Sexual Activity    Alcohol use: No     Alcohol/week: 0.0 standard drinks of alcohol    Drug use: No    Sexual activity: Not on file        Review of Systems:  GENERAL: feels generally well, no significant weight loss or weight gain  SKIN: no ulcerated or worrisome skin lesions  EYES:denies blurred vision or double vision  HEENT: denies new nasal congestion, sinus pain or ST  LUNGS: denies shortness of breath  CARDIOVASCULAR: denies chest pain  GI: no hematemesis, no worsening heartburn, no diarrhea  : no dysuria, no blood in urine, no difficulty urinating, no incontinence  MUSCULOSKELETAL: no other musculoskeletal complaints other than in HPI  NEURO: no numbness or tingling, no weakness or balance disorder  PSYCHE: no depression or anxiety  HEMATOLOGIC: no hx of blood dyscrasia, no Hx DVT/PE  ENDOCRINE: no thyroid or diabetes issues  ALL/ASTHMA: no new hx of severe allergy or asthma    Physical Examination:    /64   Pulse 68   Constitutional: appears well hydrated, alert and responsive, no acute distress noted  Extremities: Contours of the right shoulder were symmetrically normal to the left.  Motion was 140 degrees active and passive to 160 degrees.  Internal rotation to bring the thumbs to the low lumbar spine.  She possibly has  some element of adhesive capsulitis this time.  Musculoskeletal: Pain to the rotator cuff tendons seem pretty good but she splinted a little to the supraspinatus testing.  No acromioclavicular tenderness.  Mild deltoid tenderness.  Positive impingement and biceps signs.  Neurological: Normal motor and sensory distally in the right upper extremity and no signs of cervical radiculopathy.    Imaging: X-rays of the right shoulder in the office today show mild degenerative changes but certainly no bone-on-bone glenohumeral issues and no high riding humerus.  Acromioclavicular joint also with mild degenerative changes.      No results found.     Lab Results   Component Value Date    WBC 8.6 10/09/2023    HGB 12.3 10/09/2023    .0 10/09/2023      Lab Results   Component Value Date    GLU 99 10/09/2023    BUN 18 10/09/2023    CREATSERUM 0.70 10/09/2023    GFRNAA 92 01/08/2022    GFRAA 106 01/08/2022        Assessment and Plan:  Diagnoses and all orders for this visit:    Tendinitis of right rotator cuff  -     MRI SHOULDER, RIGHT (CPT=73221); Future  -     PHYSICAL THERAPY - INTERNAL  -     Arthrocentesis aspiration and injection major Right joint bursa w/o US  -     triamcinolone acetonide (Kenalog-40) 40 MG/ML injection 40 mg    Pain  -     XR SHOULDER, COMPLETE (MIN 2 VIEWS), RIGHT (CPT=73030); Future  -     Arthrocentesis aspiration and injection major Right joint bursa w/o US  -     triamcinolone acetonide (Kenalog-40) 40 MG/ML injection 40 mg    Subacromial impingement of right shoulder  -     MRI SHOULDER, RIGHT (CPT=73221); Future  -     PHYSICAL THERAPY - INTERNAL  -     Arthrocentesis aspiration and injection major Right joint bursa w/o US  -     triamcinolone acetonide (Kenalog-40) 40 MG/ML injection 40 mg    Nontraumatic incomplete tear of right rotator cuff  -     MRI SHOULDER, RIGHT (CPT=73221); Future  -     PHYSICAL THERAPY - INTERNAL  -     Arthrocentesis aspiration and injection major Right joint  bursa w/o US  -     triamcinolone acetonide (Kenalog-40) 40 MG/ML injection 40 mg    Biceps tendinitis of right shoulder  -     MRI SHOULDER, RIGHT (CPT=73221); Future  -     PHYSICAL THERAPY - INTERNAL  -     Arthrocentesis aspiration and injection major Right joint bursa w/o US  -     triamcinolone acetonide (Kenalog-40) 40 MG/ML injection 40 mg    Superior glenoid labrum lesion of right shoulder, subsequent encounter  -     MRI SHOULDER, RIGHT (CPT=73221); Future  -     PHYSICAL THERAPY - INTERNAL  -     Arthrocentesis aspiration and injection major Right joint bursa w/o US  -     triamcinolone acetonide (Kenalog-40) 40 MG/ML injection 40 mg        Assessment: Persistent symptoms intermittently.    Plan: I recommended physical therapy formally as well as an aggressive home exercise program to fix what stiffness she has.  Will get an MRI to see where we stand since last MRI was almost 2 years ago.  Today she did decide to proceed with cortisone injection again.  Aspiration was negative this time and we did not obtain fluid like last visit.  I injected bupivacaine 0.5% 5 cc and Kenalog 1 cc without issue.    Follow Up: No follow-ups on file.    Tim Li MD

## 2024-04-01 NOTE — PROGRESS NOTES
Per verbal order from Dr. Li, draw up 5ml of 0.5% Marcaine and 1ml of Kenalog 40 for cortisone injection to right shoulder. Sydney BURGER MA  Patient provided education handout for cortisone injection.

## 2024-04-07 ENCOUNTER — TELEPHONE (OUTPATIENT)
Dept: INTERNAL MEDICINE CLINIC | Facility: CLINIC | Age: 74
End: 2024-04-07

## 2024-04-07 DIAGNOSIS — K86.2 PANCREATIC CYST (HCC): Primary | ICD-10-CM

## 2024-04-09 ENCOUNTER — TELEPHONE (OUTPATIENT)
Dept: INTERNAL MEDICINE CLINIC | Facility: CLINIC | Age: 74
End: 2024-04-09

## 2024-04-09 DIAGNOSIS — M25.511 RIGHT SHOULDER PAIN, UNSPECIFIED CHRONICITY: ICD-10-CM

## 2024-04-09 DIAGNOSIS — M75.41 IMPINGEMENT SYNDROME OF RIGHT SHOULDER: Primary | ICD-10-CM

## 2024-04-09 DIAGNOSIS — M75.21 BICIPITAL TENDINITIS OF RIGHT SHOULDER: ICD-10-CM

## 2024-04-09 NOTE — TELEPHONE ENCOUNTER
Initial Evaluation plan of care 4/8/2024 from [facility] physical placed on Dr. AKIL wilcox for signature and review.

## 2024-04-10 ENCOUNTER — MED REC SCAN ONLY (OUTPATIENT)
Dept: INTERNAL MEDICINE CLINIC | Facility: CLINIC | Age: 74
End: 2024-04-10

## 2024-04-10 NOTE — TELEPHONE ENCOUNTER
Initial Evaluation/Plan of care dated 4/8/24 signed and faxed to [facility] at [phone], confirmation received.

## 2024-04-10 NOTE — TELEPHONE ENCOUNTER
Referral request received from [facility] for physical therapy for right shoulder. Referral Pended with referral information for your review and signature.

## 2024-04-16 NOTE — TELEPHONE ENCOUNTER
2nd attempt, no answer, left message for pt to call back to inform of Dr Morrison's message and advise that authorization has been obtained and valid until 5/10/24. Pt may contact central scheduling at [phone] to schedule exam. iogyn message sent to pt as well to inform of above information.

## 2024-04-23 ENCOUNTER — HOSPITAL ENCOUNTER (OUTPATIENT)
Dept: MRI IMAGING | Facility: HOSPITAL | Age: 74
Discharge: HOME | End: 2024-04-23
Attending: ORTHOPAEDIC SURGERY
Payer: MEDICARE

## 2024-04-23 DIAGNOSIS — M75.41 SUBACROMIAL IMPINGEMENT OF RIGHT SHOULDER: ICD-10-CM

## 2024-04-23 DIAGNOSIS — S43.431D SUPERIOR GLENOID LABRUM LESION OF RIGHT SHOULDER, SUBSEQUENT ENCOUNTER: ICD-10-CM

## 2024-04-23 DIAGNOSIS — M75.81 TENDINITIS OF RIGHT ROTATOR CUFF: ICD-10-CM

## 2024-04-23 DIAGNOSIS — M75.111 NONTRAUMATIC INCOMPLETE TEAR OF RIGHT ROTATOR CUFF: ICD-10-CM

## 2024-04-23 DIAGNOSIS — M75.21 BICEPS TENDINITIS OF RIGHT SHOULDER: ICD-10-CM

## 2024-04-23 PROCEDURE — 73221 MRI JOINT UPR EXTREM W/O DYE: CPT | Performed by: ORTHOPAEDIC SURGERY

## 2024-04-26 ENCOUNTER — TELEPHONE (OUTPATIENT)
Dept: INTERNAL MEDICINE CLINIC | Facility: CLINIC | Age: 74
End: 2024-04-26

## 2024-04-26 DIAGNOSIS — H40.9 GLAUCOMA, UNSPECIFIED GLAUCOMA TYPE, UNSPECIFIED LATERALITY: Primary | ICD-10-CM

## 2024-04-26 NOTE — TELEPHONE ENCOUNTER
Patient is requesting referral.     Name of specialist and specialty department :   Dr. Issac Mock, Ophthalmology    Reason for visit with the specialist:  follow up visit for high pressure in eye - glaucomoa    Address of the specialist office: [facility]  [address] [place] [address]    Appointment date:  4/30/24       CSS informed patient the turnaround time for referral is 5-7 business days.  Patient was informed to check their [employer] account for referral status.

## 2024-04-26 NOTE — TELEPHONE ENCOUNTER
Dr. Morrison,     Patient called requesting referral to Dr. Cali Mock.    Pended referral please review diagnosis and sign off if you agree.    Thank you.  Katherine Manzo  Managed Care

## 2024-05-14 ENCOUNTER — TELEPHONE (OUTPATIENT)
Dept: INTERNAL MEDICINE CLINIC | Facility: CLINIC | Age: 74
End: 2024-05-14

## 2024-05-14 DIAGNOSIS — H92.09 OTALGIA, UNSPECIFIED LATERALITY: Primary | ICD-10-CM

## 2024-05-14 NOTE — TELEPHONE ENCOUNTER
Patient stated sometimes she have pain in her ears , no pain at the time , stated it is on and off

## 2024-05-14 NOTE — TELEPHONE ENCOUNTER
Patient calling to get a referral to ENT    Please call her to discuss and let her know when the referral is available.  Patient also has some other medical questions.

## 2024-05-15 NOTE — TELEPHONE ENCOUNTER
Called patient to inform of ENT referral and provided:  Provider Address Phone   Frederick Ybarra MD [address] [place] [id number] [phone]

## 2024-05-22 ENCOUNTER — TELEPHONE (OUTPATIENT)
Dept: INTERNAL MEDICINE CLINIC | Facility: CLINIC | Age: 74
End: 2024-05-22

## 2024-05-22 NOTE — TELEPHONE ENCOUNTER
Patient requesting refill     [id number] - [place] [place] - [address] AT [facility] [address]       Medication Detail    Medication Quantity Refills Start End   atorvastatin 40 MG Oral Tab 90 tablet 3 6/19/2023 --   Sig:   Take 1 tablet (40 mg total) by mouth nightly.     Route:   Oral     Order #:   [id number]

## 2024-05-22 NOTE — TELEPHONE ENCOUNTER
Patient called to request an Order for an MRI of the abdomen with GI. Patient is scheduled for an MRI 5/23 @ 8:45am.

## 2024-05-22 NOTE — TELEPHONE ENCOUNTER
Spoke to patient, full name and date of birth verified.   Patient has appointment tomorrow for MRI at [place]. Patient received a message through [facility] that she needed to bring a paper copy of the MRI order.   RN stated will double-check with Central Scheduling and call the patient back.     Call to Central Scheduling, spoke to Keli who confirmed they have the order dated 4/7/24 and patient does NOT need to bring order with her. Keli asked RN to remind patient to arrive 30 minutes early (8:15 AM) and have nothing to eat or drink for at least 4 hours prior to the test.     Spoke to patient, informed of the above information from Central Scheduling. Patient verbalized understanding, no further questions at this time.

## 2024-05-23 ENCOUNTER — HOSPITAL ENCOUNTER (OUTPATIENT)
Dept: MRI IMAGING | Age: 74
Discharge: HOME | End: 2024-05-23
Attending: INTERNAL MEDICINE

## 2024-05-23 DIAGNOSIS — K86.2 PANCREATIC CYST (HCC): ICD-10-CM

## 2024-05-23 PROCEDURE — 74181 MRI ABDOMEN W/O CONTRAST: CPT | Performed by: INTERNAL MEDICINE

## 2024-05-23 RX ORDER — ATORVASTATIN CALCIUM 40 MG/1
40 TABLET, FILM COATED ORAL NIGHTLY
Qty: 90 TABLET | Refills: 3 | Status: SHIPPED | OUTPATIENT
Start: 2024-05-23

## 2024-05-23 NOTE — TELEPHONE ENCOUNTER
Patient called to check the status on this refill. Per patient she is completely out of her medication.

## 2024-05-23 NOTE — TELEPHONE ENCOUNTER
Refill passed per [facility] protocol.  Requested Prescriptions   Pending Prescriptions Disp Refills    atorvastatin 40 MG Oral Tab 90 tablet 3     Sig: Take 1 tablet (40 mg total) by mouth nightly.       Cholesterol Medication Protocol Passed - 5/23/2024 10:03 AM        Passed - ALT < 80     Lab Results   Component Value Date    ALT 33 10/09/2023             Passed - ALT resulted within past year        Passed - Lipid panel within past 12 months     Lab Results   Component Value Date    CHOLEST 123 10/09/2023    TRIG 149 10/09/2023    HDL 59 10/09/2023    LDL 39 10/09/2023    VLDL 20 10/09/2023    NONHDLC 64 10/09/2023             Passed - In person appointment or virtual visit in the past 12 mos or appointment in next 3 mos     Recent Outpatient Visits              1 month ago Tendinitis of right rotator cuff    [facility] Tim Li MD    Office Visit    4 months ago Rotator cuff tendonitis, left    [facility] [place] Minh Morrison MD    Office Visit    6 months ago     [facility], [place] Kandel, Ninel, MD    Office Visit    7 months ago Tendinitis of right rotator cuff    [facility], [place] Tim Li MD    Office Visit    8 months ago Medicare annual wellness visit, subsequent    [facility] Minh Morrison MD    Office Visit          Future Appointments         Provider Department Appt Notes    In 2 days Minh Morrison MD [facility] pain on left side of breast/shoulder    In 1 week Tim Li MD [facility] mri follow up    In 2 weeks Minh Morrison MD [facility]                       Future Appointments         Provider Department Appt Notes    In 2  days Minh Morrison MD [facility] pain on left side of breast/shoulder    In 1 week Tim Li MD [facility] mri follow up    In 2 weeks Minh Morrison MD [facility]          Recent Outpatient Visits              1 month ago Tendinitis of right rotator cuff    [facility] [place] [place] Tim Li MD    Office Visit    4 months ago Rotator cuff tendonitis, left    [facility], [place] Minh Morrison MD    Office Visit    6 months ago     [facility], [place] Kandel, Ninel, MD    Office Visit    7 months ago Tendinitis of right rotator cuff    [facility] [place]    Office Visit    8 months ago Medicare annual wellness visit, subsequent    [facility] Minh Morrison MD    Office Visit

## 2024-05-24 ENCOUNTER — TELEPHONE (OUTPATIENT)
Dept: SURGERY | Facility: CLINIC | Age: 74
End: 2024-05-24

## 2024-05-24 NOTE — TELEPHONE ENCOUNTER
----- Message from Sabi SOLIZ sent at 5/24/2024 11:59 AM CDT -----  Regarding: RE: CONSULT  Hi,    Pt is due for annual mammogram. Should contact PCP for orders, then see us when this is completed.    Thanks,  Sabi  ----- Message -----  From: Kenzie Bee  Sent: 5/13/2024   2:50 PM CDT  To: Vandana Lind Rns; #  Subject: CONSULT                                              CONSULT  Referred by  Minh Morrison MD  Reason  Mass of lower outer quadrant of right breast  [facility] ([facility] calling [facility]CP for HMO referral)  [place] preferred

## 2024-05-25 ENCOUNTER — LAB ENCOUNTER (OUTPATIENT)
Dept: LAB | Age: 74
End: 2024-05-25
Attending: INTERNAL MEDICINE

## 2024-05-25 ENCOUNTER — HOSPITAL ENCOUNTER (OUTPATIENT)
Dept: GENERAL RADIOLOGY | Age: 74
Discharge: HOME | End: 2024-05-25
Attending: INTERNAL MEDICINE

## 2024-05-25 ENCOUNTER — EKG ENCOUNTER (OUTPATIENT)
Dept: LAB | Age: 74
End: 2024-05-25
Attending: INTERNAL MEDICINE

## 2024-05-25 ENCOUNTER — OFFICE VISIT (OUTPATIENT)
Dept: INTERNAL MEDICINE CLINIC | Facility: CLINIC | Age: 74
End: 2024-05-25

## 2024-05-25 VITALS
TEMPERATURE: 99 F | OXYGEN SATURATION: 96 % | BODY MASS INDEX: 27.75 KG/M2 | DIASTOLIC BLOOD PRESSURE: 68 MMHG | SYSTOLIC BLOOD PRESSURE: 126 MMHG | HEIGHT: 63 IN | HEART RATE: 80 BPM | WEIGHT: 156.63 LBS

## 2024-05-25 DIAGNOSIS — R07.89 OTHER CHEST PAIN: ICD-10-CM

## 2024-05-25 DIAGNOSIS — E11.69 HYPERLIPIDEMIA ASSOCIATED WITH TYPE 2 DIABETES MELLITUS (HCC): ICD-10-CM

## 2024-05-25 DIAGNOSIS — E11.9 TYPE 2 DIABETES MELLITUS WITHOUT COMPLICATION, WITHOUT LONG-TERM CURRENT USE OF INSULIN (HCC): ICD-10-CM

## 2024-05-25 DIAGNOSIS — I70.0 ATHEROSCLEROSIS OF AORTA (HCC): ICD-10-CM

## 2024-05-25 DIAGNOSIS — N64.4 BREAST PAIN, LEFT: ICD-10-CM

## 2024-05-25 DIAGNOSIS — E11.59 HYPERTENSION ASSOCIATED WITH TYPE 2 DIABETES MELLITUS (HCC): ICD-10-CM

## 2024-05-25 DIAGNOSIS — R07.89 OTHER CHEST PAIN: Primary | ICD-10-CM

## 2024-05-25 DIAGNOSIS — E78.5 HYPERLIPIDEMIA ASSOCIATED WITH TYPE 2 DIABETES MELLITUS (HCC): ICD-10-CM

## 2024-05-25 DIAGNOSIS — I15.2 HYPERTENSION ASSOCIATED WITH TYPE 2 DIABETES MELLITUS (HCC): ICD-10-CM

## 2024-05-25 DIAGNOSIS — K86.2 PANCREATIC CYST (HCC): ICD-10-CM

## 2024-05-25 LAB
ATRIAL RATE: 71 BPM
D DIMER PPP FEU-MCNC: 0.53 UG/ML FEU (ref ?–0.72)
HEMOGLOBIN A1C: 6.2 % (ref 4.3–5.6)
P AXIS: -3 DEGREES
P-R INTERVAL: 94 MS
Q-T INTERVAL: 406 MS
QRS DURATION: 80 MS
QTC CALCULATION (BEZET): 441 MS
R AXIS: 67 DEGREES
T AXIS: 66 DEGREES
TROPONIN I SERPL HS-MCNC: <3 NG/L
VENTRICULAR RATE: 71 BPM

## 2024-05-25 PROCEDURE — 3074F SYST BP LT 130 MM HG: CPT | Performed by: INTERNAL MEDICINE

## 2024-05-25 PROCEDURE — 3044F HG A1C LEVEL LT 7.0%: CPT | Performed by: INTERNAL MEDICINE

## 2024-05-25 PROCEDURE — 84484 ASSAY OF TROPONIN QUANT: CPT

## 2024-05-25 PROCEDURE — 1126F AMNT PAIN NOTED NONE PRSNT: CPT | Performed by: INTERNAL MEDICINE

## 2024-05-25 PROCEDURE — 3008F BODY MASS INDEX DOCD: CPT | Performed by: INTERNAL MEDICINE

## 2024-05-25 PROCEDURE — 83036 HEMOGLOBIN GLYCOSYLATED A1C: CPT | Performed by: INTERNAL MEDICINE

## 2024-05-25 PROCEDURE — 1159F MED LIST DOCD IN RCRD: CPT | Performed by: INTERNAL MEDICINE

## 2024-05-25 PROCEDURE — 99214 OFFICE O/P EST MOD 30 MIN: CPT | Performed by: INTERNAL MEDICINE

## 2024-05-25 PROCEDURE — 1160F RVW MEDS BY RX/DR IN RCRD: CPT | Performed by: INTERNAL MEDICINE

## 2024-05-25 PROCEDURE — 93010 ELECTROCARDIOGRAM REPORT: CPT | Performed by: INTERNAL MEDICINE

## 2024-05-25 PROCEDURE — 71046 X-RAY EXAM CHEST 2 VIEWS: CPT | Performed by: INTERNAL MEDICINE

## 2024-05-25 PROCEDURE — 85379 FIBRIN DEGRADATION QUANT: CPT

## 2024-05-25 PROCEDURE — 3078F DIAST BP <80 MM HG: CPT | Performed by: INTERNAL MEDICINE

## 2024-05-25 PROCEDURE — 36415 COLL VENOUS BLD VENIPUNCTURE: CPT

## 2024-05-25 PROCEDURE — 93005 ELECTROCARDIOGRAM TRACING: CPT

## 2024-05-25 NOTE — PROGRESS NOTES
Subjective:     Patient ID: Beatriz Rosario is a 73 year old female.    Chest Pressure  This is a new problem. The current episode started in the past 7 days. The problem occurs intermittently. The problem has been unchanged. Associated symptoms include chest pain. Pertinent negatives include no coughing, diaphoresis, fever, nausea, numbness, vomiting or weakness. Nothing aggravates the symptoms. She has tried nothing for the symptoms. The treatment provided no relief.       History/Other:   Review of Systems   Constitutional: Negative.  Negative for diaphoresis and fever.   Respiratory:  Negative for cough, shortness of breath and wheezing.    Cardiovascular:  Positive for chest pain. Negative for palpitations and leg swelling.   Gastrointestinal: Negative.  Negative for nausea and vomiting.   Neurological:  Negative for weakness and numbness.     Current Outpatient Medications   Medication Sig Dispense Refill    atorvastatin 40 MG Oral Tab Take 1 tablet (40 mg total) by mouth nightly. 90 tablet 3    Meloxicam 7.5 MG Oral Tab Take 1 tablet (7.5 mg total) by mouth daily as needed for Pain. 90 tablet 1    hydroCHLOROthiazide 12.5 MG Oral Tab Take 1 tablet (12.5 mg total) by mouth 2 (two) times daily. 180 tablet 3    montelukast 10 MG Oral Tab Take 1 tablet (10 mg total) by mouth nightly. 30 tablet 3    losartan 50 MG Oral Tab Take 1 tablet (50 mg total) by mouth 2 (two) times daily. 180 tablet 3    fluticasone propionate (FLOVENT HFA) 110 MCG/ACT Inhalation Aerosol Inhale 2 puffs into the lungs 2 (two) times daily. 1 each 0    amLODIPine 2.5 MG Oral Tab Take 1 tablet (2.5 mg total) by mouth in the morning and 1 tablet (2.5 mg total) before bedtime. 180 tablet 3    PANTOPRAZOLE SODIUM 40 MG Oral Tab EC TAKE 1 TABLET(40 MG) BY MOUTH EVERY MORNING BEFORE BREAKFAST 90 tablet 0    latanoprost (XALATAN) 0.005 % Ophthalmic Solution   3    Calcium Carbonate-Vitamin D 600-200 MG-UNIT Oral Cap Take  by mouth. 1 po q12hrs.       aspirin 81 MG Oral Tab EC Take  by mouth. take 1 tablet (81MG)  by ORAL route  every day      Multiple Vitamin (MULTI-DAY) Oral Tab Take  by mouth.      glipiZIDE ER 2.5 MG Oral Tablet 24 Hr Take 1 tablet (2.5 mg total) by mouth daily. (Patient not taking: Reported on 5/25/2024) 90 tablet 3    Glucose Blood (TRUE METRIX BLOOD GLUCOSE TEST) In Vitro Strip 1 strip by In Vitro route daily. 100 strip 11    Lancets Does not apply Misc Test once daily 100 each 11    methylPREDNISolone (MEDROL) 4 MG Oral Tablet Therapy Pack As directed. (Patient not taking: Reported on 5/25/2024) 1 each 0    Blood Glucose Monitoring Suppl (TRUE METRIX AIR GLUCOSE METER) w/Device Does not apply Kit Use to test Blood sugar daily 1 kit 0    ibuprofen 200 MG Oral Tab Take 0.5 tablets (100 mg total) by mouth as needed for Pain. (Patient not taking: Reported on 5/25/2024)       Allergies:  Allergies   Allergen Reactions    Latex        Past Medical History:    Allergic rhinitis    Cataracts, bilateral    Diabetes (HCC)    Diabetes mellitus, type II (HCC)    Ductal carcinoma in situ of right breast    Endometrial thickening on ultra sound    Glaucoma, right eye    Lumbar arthropathy    Osteoarthritis    Other and unspecified hyperlipidemia    Positive PPD    S/P INH prophylaxis for 6 months     Unspecified essential hypertension    Vitamin B12 deficiency anemia    Vitamin D deficiency      Past Surgical History:   Procedure Laterality Date    Biopsy of breast, needle core Bilateral 2011    Breast [facility] Right [facility]    Colonoscopy  2008    Colonoscopy  06/2021    Colonoscopy N/A 06/25/2021    Procedure: COLONOSCOPY,;  Surgeon: Dirk Robbins MD;  Location: [facility], [facility]    Hysterectomy  2016    Laparoscopy,vag hysterectomy  09/26/2016    w/ BSO, benign    Lumpectomy right  08/06/2010    Needle biopsy left Left 08/12/2011    Radiation right  2011    breast      Family History   Problem Relation Age of Onset    Stroke  Father 84    Hypertension Father 82        myocardial infarction    Heart Disease Father 82        CAD    Heart Disorder Father     Heart Disease Mother 77        CAD    Hypertension Mother         mycardial infarction    Musculo-skelatal Disorder Mother         osteoporosis    Heart Disorder Mother     Breast Cancer Sister 50        chemo; doing fine    Cancer Sister         breast cancer    Breast Cancer Maternal Aunt         post    Breast Cancer Self 59    DCIS Self 60      Social History:   Social History     Socioeconomic History    Marital status:     Number of children: 0   Occupational History    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     Employer: [employer]   Tobacco Use    Smoking status: Never     Passive exposure: Never    Smokeless tobacco: Never   Vaping Use    Vaping status: Never Used   Substance and Sexual Activity    Alcohol use: No     Alcohol/week: 0.0 standard drinks of alcohol    Drug use: No   Other Topics Concern    Caffeine Concern Yes     Comment: 2 cups of coffee per day      Social Determinants of Health      Received from [facility]        Objective:   Physical Exam  Constitutional:       General: She is not in acute distress.     Appearance: She is well-developed. She is not ill-appearing, toxic-appearing or diaphoretic.   HENT:      Head: Normocephalic and atraumatic.      Right Ear: External ear normal.      Left Ear: External ear normal.      Nose: Nose normal.      Mouth/Throat:      Pharynx: No oropharyngeal exudate.   Eyes:      General:         Right eye: No discharge.         Left eye: No discharge.      Conjunctiva/sclera: Conjunctivae normal.      Pupils: Pupils are equal, round, and reactive to light.   Neck:      Vascular: No JVD.   Cardiovascular:      Rate and Rhythm: Normal rate and regular rhythm.      Pulses: Normal pulses.      Heart sounds: Normal heart sounds. No murmur heard.     No friction rub. No gallop.    Pulmonary:      Effort: Pulmonary effort is normal. No respiratory distress.      Breath sounds: Normal breath sounds. No wheezing or rales.   Abdominal:      General: Bowel sounds are normal. There is no distension.      Palpations: Abdomen is soft. There is no mass.      Tenderness: There is no abdominal tenderness. There is no guarding or rebound.   Musculoskeletal:         General: No tenderness. Normal range of motion.      Cervical back: Normal range of motion and neck supple. No rigidity or tenderness.      Right lower leg: No edema.      Left lower leg: No edema.   Lymphadenopathy:      Cervical: No cervical adenopathy.   Skin:     General: Skin is warm and dry.      Findings: No rash.   Neurological:      Mental Status: She is alert and oriented to person, place, and time.         Assessment & Plan:   (R07.89) Other chest pain  (primary encounter diagnosis)  Plan: EKG 12 Lead to be performed at [facility], XR CHEST PA + LAT CHEST         (CPT=71046), D-Dimer [E], Troponin I (High         Sensitivity) [E]        Intermittent chest pains for one week on left upper chest, few seconds, non exertional and no assoc symptoms so atypical chest pain.  No hx of DVT/PE but states brother had  hx of PE.  No recent travel hx nor personal hx of DVT/PE before. She does have hx of breast cancer before; I told her will get d dimer, cxr and EKG today as well as troponin. If abnormal, pt to go to ER.       (E11.9) Type 2 diabetes mellitus without complication, without long-term current use of insulin (HCC)  Plan: POC Glycohemoglobin [87104]        A1c was 6.2 so dm well controlled. Cpm.       (I70.0) Atherosclerosis of aorta (HCC)  Plan: pt already on statin.     (E11.69,  E78.5) Hyperlipidemia associated with type 2 diabetes mellitus (HCC)  Plan: controlled with chol med. Cpm.     (E11.59,  I15.2) Hypertension associated with type 2 diabetes mellitus (HCC)  Plan: bp controlled with current bp meds;  cpm.     (K86.2) Pancreatic cyst (HCC)  Plan: recent MRI/mrcp showed no change in pancreatic cyst. Pt told still need to ffup with GI for recommendation for any further need for ffup in future.     (N64.4) Breast pain, left  Plan: [facility]         (CPT=77066/34639)        Pt had complained of also having left breast pain for several months; d/w pt, will do diagnostic mammogram; she also was told last year to see Dr Lind due to her complaint of feeling lump on right breast but still has not done so yet.  She had diagnostic mammogram last year that showed benign findings but was still told then to see Dr Lind. Pt states she had called already for apptment.        Orders Placed This Encounter   Procedures    POC Glycohemoglobin [87666]    D-Dimer [E]    Troponin I (High Sensitivity) [E]       Meds This Visit:  Requested Prescriptions      No prescriptions requested or ordered in this encounter       Imaging & Referrals:  [facility] (FXF=[id number])

## 2024-05-31 ENCOUNTER — TELEPHONE (OUTPATIENT)
Dept: INTERNAL MEDICINE CLINIC | Facility: CLINIC | Age: 74
End: 2024-05-31

## 2024-05-31 DIAGNOSIS — E78.5 HYPERLIPIDEMIA ASSOCIATED WITH TYPE 2 DIABETES MELLITUS (HCC): ICD-10-CM

## 2024-05-31 DIAGNOSIS — E11.69 HYPERLIPIDEMIA ASSOCIATED WITH TYPE 2 DIABETES MELLITUS (HCC): ICD-10-CM

## 2024-05-31 DIAGNOSIS — I15.2 HYPERTENSION ASSOCIATED WITH TYPE 2 DIABETES MELLITUS (HCC): Primary | ICD-10-CM

## 2024-05-31 DIAGNOSIS — E11.59 HYPERTENSION ASSOCIATED WITH TYPE 2 DIABETES MELLITUS (HCC): Primary | ICD-10-CM

## 2024-05-31 DIAGNOSIS — E11.9 TYPE 2 DIABETES MELLITUS WITHOUT COMPLICATION, WITHOUT LONG-TERM CURRENT USE OF INSULIN (HCC): ICD-10-CM

## 2024-05-31 NOTE — TELEPHONE ENCOUNTER
Patient contacted and made aware of Dr. Morrison placed. Patient verbalized understanding. No further questions or concerns at this time.

## 2024-05-31 NOTE — TELEPHONE ENCOUNTER
, no notes seen about future lab work, please see message below    Left message to call back on patient's voicemail.  Patient had lab work done 5/25/24,

## 2024-05-31 NOTE — TELEPHONE ENCOUNTER
Patient calling to state was told by Dr. Morrison lab orders would be placed during last visit, but no orders were placed.

## 2024-06-03 ENCOUNTER — OFFICE VISIT (OUTPATIENT)
Dept: ORTHOPEDICS CLINIC | Facility: CLINIC | Age: 74
End: 2024-06-03
Payer: MEDICARE

## 2024-06-03 DIAGNOSIS — M75.41 SUBACROMIAL IMPINGEMENT OF RIGHT SHOULDER: ICD-10-CM

## 2024-06-03 DIAGNOSIS — M75.121 NONTRAUMATIC COMPLETE TEAR OF RIGHT ROTATOR CUFF: Primary | ICD-10-CM

## 2024-06-03 DIAGNOSIS — S43.431S GLENOID LABRAL TEAR, RIGHT, SEQUELA: ICD-10-CM

## 2024-06-03 DIAGNOSIS — M75.81 TENDINITIS OF RIGHT ROTATOR CUFF: ICD-10-CM

## 2024-06-03 NOTE — PROGRESS NOTES
NURSING INTAKE COMMENTS:   Chief Complaint   Patient presents with    Shoulder Pain     R shoulder f/u- here for MRI result       HPI: This 73 year old female presents today for follow-up of right shoulder MRI.  Patient was seen in our office at the beginning of April, in which she received a cortisone injection as well as a new prescription for physical therapy.  She reports that the injection is still giving her pain relief and the physical therapy has helped improve her range of motion and strength.    MRI shows progression of a now full-thickness supraspinatus tendon tear, development of supraspinatus and infraspinatus muscle atrophy, and a stable nondisplaced glenoid labral tear.    Past Medical History:    Allergic rhinitis    Cataracts, bilateral    Diabetes (HCC)    Diabetes mellitus, type II (HCC)    Ductal carcinoma in situ of right breast    Endometrial thickening on ultra sound    Glaucoma, right eye    Lumbar arthropathy    Osteoarthritis    Other and unspecified hyperlipidemia    Positive PPD    S/P INH prophylaxis for 6 months     Unspecified essential hypertension    Vitamin B12 deficiency anemia    Vitamin D deficiency     Past Surgical History:   Procedure Laterality Date    Biopsy of breast, needle core Bilateral 2011    Breast biopsy Right 2001    [facility]    Colonoscopy  2008    Colonoscopy  06/2021    Colonoscopy N/A 06/25/2021    Procedure: COLONOSCOPY,;  Surgeon: Dirk Robbins MD;  Location: [facility], [facility]    Hysterectomy  2016    Laparoscopy,vag hysterectomy  09/26/2016    w/ BSO, benign    Lumpectomy right  08/06/2010    Needle biopsy left Left 08/12/2011    Radiation right  2011    breast     Current Outpatient Medications   Medication Sig Dispense Refill    atorvastatin 40 MG Oral Tab Take 1 tablet (40 mg total) by mouth nightly. 90 tablet 3    glipiZIDE ER 2.5 MG Oral Tablet 24 Hr Take 1 tablet (2.5 mg total) by mouth daily. (Patient not taking: Reported on 5/25/2024) 90  tablet 3    Glucose Blood (TRUE METRIX BLOOD GLUCOSE TEST) In Vitro Strip 1 strip by In Vitro route daily. 100 strip 11    Lancets Does not apply Misc Test once daily 100 each 11    methylPREDNISolone (MEDROL) 4 MG Oral Tablet Therapy Pack As directed. (Patient not taking: Reported on 5/25/2024) 1 each 0    Meloxicam 7.5 MG Oral Tab Take 1 tablet (7.5 mg total) by mouth daily as needed for Pain. 90 tablet 1    hydroCHLOROthiazide 12.5 MG Oral Tab Take 1 tablet (12.5 mg total) by mouth 2 (two) times daily. 180 tablet 3    montelukast 10 MG Oral Tab Take 1 tablet (10 mg total) by mouth nightly. 30 tablet 3    losartan 50 MG Oral Tab Take 1 tablet (50 mg total) by mouth 2 (two) times daily. 180 tablet 3    fluticasone propionate (FLOVENT HFA) 110 MCG/ACT Inhalation Aerosol Inhale 2 puffs into the lungs 2 (two) times daily. 1 each 0    amLODIPine 2.5 MG Oral Tab Take 1 tablet (2.5 mg total) by mouth in the morning and 1 tablet (2.5 mg total) before bedtime. 180 tablet 3    Blood Glucose Monitoring Suppl (TRUE METRIX AIR GLUCOSE METER) w/Device Does not apply Kit Use to test Blood sugar daily 1 kit 0    ibuprofen 200 MG Oral Tab Take 0.5 tablets (100 mg total) by mouth as needed for Pain. (Patient not taking: Reported on 5/25/2024)      PANTOPRAZOLE SODIUM 40 MG Oral Tab EC TAKE 1 TABLET(40 MG) BY MOUTH EVERY MORNING BEFORE BREAKFAST 90 tablet 0    latanoprost (XALATAN) 0.005 % Ophthalmic Solution   3    Calcium Carbonate-Vitamin D 600-200 MG-UNIT Oral Cap Take  by mouth. 1 po q12hrs.      aspirin 81 MG Oral Tab EC Take  by mouth. take 1 tablet (81MG)  by ORAL route  every day      Multiple Vitamin (MULTI-DAY) Oral Tab Take  by mouth.       Allergies   Allergen Reactions    Latex      Family History   Problem Relation Age of Onset    Stroke Father 84    Hypertension Father 82        myocardial infarction    Heart Disease Father 82        CAD    Heart Disorder Father     Heart Disease Mother 77        CAD    Hypertension  Mother         mycardial infarction    Musculo-skelatal Disorder Mother         osteoporosis    Heart Disorder Mother     Breast Cancer Sister 50        chemo; doing fine    Cancer Sister         breast cancer    Breast Cancer Maternal Aunt         post    Breast Cancer Self 59    DCIS Self 59     No family Hx of DVT/PE    Social History     Occupational History    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     Employer: [employer]   Tobacco Use    Smoking status: Never     Passive exposure: Never    Smokeless tobacco: Never   Vaping Use    Vaping status: Never Used   Substance and Sexual Activity    Alcohol use: No     Alcohol/week: 0.0 standard drinks of alcohol    Drug use: No    Sexual activity: Not on file        Review of Systems:  GENERAL: feels generally well, no significant weight loss or weight gain  SKIN: no ulcerated or worrisome skin lesions  EYES:denies blurred vision or double vision  HEENT: denies new nasal congestion, sinus pain or ST  LUNGS: denies shortness of breath  CARDIOVASCULAR: denies chest pain  GI: no hematemesis, no worsening heartburn, no diarrhea  : no dysuria, no blood in urine, no difficulty urinating, no incontinence  MUSCULOSKELETAL: no other musculoskeletal complaints other than in HPI  NEURO: no numbness or tingling, no weakness or balance disorder  PSYCHE: no depression or anxiety  HEMATOLOGIC: no hx of blood dyscrasia, no Hx DVT/PE  ENDOCRINE: no thyroid or diabetes issues  ALL/ASTHMA: no new hx of severe allergy or asthma    Physical Examination:    There were no vitals taken for this visit.  Constitutional: appears well hydrated, alert and responsive, no acute distress noted  Extremities: No asymmetric warmth or swelling to right shoulder.  Musculoskeletal: Motion improved, forward flexion 160 degrees.  Internal rotation brings thumb to thoracic lumbar junction.  Externally rotates to 30 degrees.  Positive impingement and biceps sign today.  Negative  AC crossover test and A/C joint tenderness.  Neurological: Normal motor and sensory function.    Imaging: As stated above, MRI shows progressed full-thickness supraspinatus tendon tear, and infraspinatus anterior myotendinous junction tear.  MRI also shows supraspinatus and infraspinatus muscle atrophy, and nondisplaced glenoid labral tear.  MRI shows worsening arthritis.      XR CHEST PA + LAT CHEST (CPT=71046)    Result Date: 5/26/2024  PROCEDURE: XR CHEST PA + LAT CHEST (CPT=71046)  COMPARISON: [facility], XR CHEST PA + LAT CHEST (CPT=71046), 11/14/2023, 10:13 AM.  [facility], XR CHEST PA + LAT CHEST (CPT=71046), 10/09/2023, 10:53 AM.  [facility], XR CHEST PA + LAT CHEST (CPT=71046), 9/07/2021, 11:40 AM.  [facility], X CHEST PA LAT ROUTINE, 3/18/2011, 3:16 PM.  INDICATIONS: Intermittent left-sided chest wall pain ongoing for 2 weeks. History of hypertension and diabetes.  TECHNIQUE:   Two views.   FINDINGS: CARDIAC/VASC: The cardiomediastinal silhouette is not enlarged. There is moderate tortuosity of the thoracic aorta with peripheral atherosclerotic vascular calcification. The pulmonary vascularity is within normal limits. MEDIAST/DIMITRIOS: No visible mass or adenopathy. LUNGS/PLEURA: The lungs are hyperexpanded. There are relative lucencies of the upper lobes bilaterally, and coarsened bronchovascular markings are apparent. No airspace consolidation, pleural effusion, or pneumothorax is evident.  BONES: Mild degenerative changes of the thoracic spine are apparent. There is superior migration of the humeral heads with narrowing of the acromiohumeral intervals, suggesting chronic full-thickness rotator cuff tears.          CONCLUSION:  Stable chest demonstrating hyperexpanded lungs, possibly with underlying COPD, without radiographically evident acute intrathoracic process.    Dictated by (CST): Jacek Olvera MD on 5/26/2024 at 10:26 AM      Finalized by (CST): Jacek Olvera MD on 5/26/2024 at 10:27 AM          MRI MRCP (CPT=74181)    Result Date: 5/23/2024  PROCEDURE: MRI MRCP (CPT=74181)  MRCP   COMPARISON: [facility], MRI MRCP (W+WO) (CPT=74183), 5/01/2023, 7:34 AM.  INDICATIONS: K86.2 Pancreatic cyst (HCC)  TECHNIQUE: A comprehensive examination was performed utilizing a variety of imaging planes and imaging parameters to optimize visualization of suspected pathology.  Images were obtained without contrast.    Magnetic resonance cholangiopancreatography was also performed.  Findings:  Stable 1.3 x 1.1 centimeter thin walled simple cyst in the proximal pancreatic body communicating with the underlying main duct  The other tiny cyst previously described in the pancreatic head is not visible  Normal size pancreas with normal slender duct.  No pancreatic inflammation  Normal gallbladder and biliary tree  Normal liver morphology.  Normal spleen and adrenals.  Unobstructed kidneys with simple cyst in each kidney  Normal size aorta.  No adenopathy or ascites         CONCLUSION: Stable 1.3 x 1.1 centimeter simple appearing side-branch IPMN in the proximal pancreatic body.    Dictated by (CST): Karan Sauer MD on 5/23/2024 at 11:05 AM     Finalized by (CST): Karan Sauer MD on 5/23/2024 at 11:26 AM             Lab Results   Component Value Date    WBC 8.6 10/09/2023    HGB 12.3 10/09/2023    .0 10/09/2023      Lab Results   Component Value Date    GLU 99 10/09/2023    BUN 18 10/09/2023    CREATSERUM 0.70 10/09/2023    GFRNAA 92 01/08/2022    GFRAA 106 01/08/2022        Assessment and Plan:  Diagnoses and all orders for this visit:    Nontraumatic complete tear of right rotator cuff    Tendinitis of right rotator cuff    Subacromial impingement of right shoulder    Glenoid labral tear, right, sequela        Assessment: As above    Plan: I discussed several options with the patient including nonoperative versus operative care.   Discussed with her that nonoperative care includes cortisone injections as needed as well as physical therapy.  Discussed that operative care would include an arthroscopy, with typical recovery time approximately 8 months.  At this time, the patient is not interested in surgery.  She notes that she lives by herself, and it would be too hard to get around as well as do her tasks of daily living by herself.  Patient also notes that she is improved with both pain and range of motion.    I did discuss with the patient that due to the MRI showing muscle atrophy, if she delays her surgical care and decide at a later time that she wants the surgery, it may be too late.    Discussed with the patient that she should continue doing her physical therapy as well as doing her home exercises and self rehab daily.  Discussed with her that she can have cortisone injections every 4 months.  We will see her as needed at this time.    Follow Up: No follow-ups on file.    AUGUSTINA Gray

## 2024-06-06 ENCOUNTER — LAB ENCOUNTER (OUTPATIENT)
Dept: LAB | Age: 74
End: 2024-06-06
Attending: INTERNAL MEDICINE
Payer: MEDICARE

## 2024-06-06 DIAGNOSIS — E78.5 HYPERLIPIDEMIA ASSOCIATED WITH TYPE 2 DIABETES MELLITUS (HCC): ICD-10-CM

## 2024-06-06 DIAGNOSIS — I15.2 HYPERTENSION ASSOCIATED WITH TYPE 2 DIABETES MELLITUS (HCC): ICD-10-CM

## 2024-06-06 DIAGNOSIS — E11.9 TYPE 2 DIABETES MELLITUS WITHOUT COMPLICATION, WITHOUT LONG-TERM CURRENT USE OF INSULIN (HCC): ICD-10-CM

## 2024-06-06 DIAGNOSIS — E11.69 HYPERLIPIDEMIA ASSOCIATED WITH TYPE 2 DIABETES MELLITUS (HCC): ICD-10-CM

## 2024-06-06 DIAGNOSIS — E11.59 HYPERTENSION ASSOCIATED WITH TYPE 2 DIABETES MELLITUS (HCC): ICD-10-CM

## 2024-06-06 LAB
ALBUMIN SERPL-MCNC: 4.6 G/DL (ref 3.2–4.8)
ALBUMIN/GLOB SERPL: 2.2 {RATIO} (ref 1–2)
ALP LIVER SERPL-CCNC: 73 U/L
ALT SERPL-CCNC: 25 U/L
ANION GAP SERPL CALC-SCNC: 7 MMOL/L (ref 0–18)
AST SERPL-CCNC: 25 U/L (ref ?–34)
BASOPHILS # BLD AUTO: 0.05 X10(3) UL (ref 0–0.2)
BASOPHILS NFR BLD AUTO: 0.7 %
BILIRUB SERPL-MCNC: 0.7 MG/DL (ref 0.2–1.1)
BUN BLD-MCNC: 13 MG/DL (ref 9–23)
BUN/CREAT SERPL: 19.4 (ref 10–20)
CALCIUM BLD-MCNC: 9.4 MG/DL (ref 8.7–10.4)
CHLORIDE SERPL-SCNC: 108 MMOL/L (ref 98–112)
CHOLEST SERPL-MCNC: 152 MG/DL (ref ?–200)
CO2 SERPL-SCNC: 28 MMOL/L (ref 21–32)
CREAT BLD-MCNC: 0.67 MG/DL
CREAT UR-SCNC: 144.1 MG/DL
DEPRECATED RDW RBC AUTO: 50.3 FL (ref 35.1–46.3)
EGFRCR SERPLBLD CKD-EPI 2021: 92 ML/MIN/1.73M2 (ref 60–?)
EOSINOPHIL # BLD AUTO: 0.11 X10(3) UL (ref 0–0.7)
EOSINOPHIL NFR BLD AUTO: 1.5 %
ERYTHROCYTE [DISTWIDTH] IN BLOOD BY AUTOMATED COUNT: 14.8 % (ref 11–15)
FASTING PATIENT LIPID ANSWER: YES
FASTING STATUS PATIENT QL REPORTED: YES
GLOBULIN PLAS-MCNC: 2.1 G/DL (ref 2–3.5)
GLUCOSE BLD-MCNC: 103 MG/DL (ref 70–99)
HCT VFR BLD AUTO: 38.2 %
HDLC SERPL-MCNC: 54 MG/DL (ref 40–59)
HGB BLD-MCNC: 12.5 G/DL
IMM GRANULOCYTES # BLD AUTO: 0.03 X10(3) UL (ref 0–1)
IMM GRANULOCYTES NFR BLD: 0.4 %
LDLC SERPL CALC-MCNC: 60 MG/DL (ref ?–100)
LYMPHOCYTES # BLD AUTO: 3.35 X10(3) UL (ref 1–4)
LYMPHOCYTES NFR BLD AUTO: 45.1 %
MCH RBC QN AUTO: 30.2 PG (ref 26–34)
MCHC RBC AUTO-ENTMCNC: 32.7 G/DL (ref 31–37)
MCV RBC AUTO: 92.3 FL
MICROALBUMIN UR-MCNC: 1.4 MG/DL
MICROALBUMIN/CREAT 24H UR-RTO: 9.7 UG/MG (ref ?–30)
MONOCYTES # BLD AUTO: 0.5 X10(3) UL (ref 0.1–1)
MONOCYTES NFR BLD AUTO: 6.7 %
NEUTROPHILS # BLD AUTO: 3.38 X10 (3) UL (ref 1.5–7.7)
NEUTROPHILS # BLD AUTO: 3.38 X10(3) UL (ref 1.5–7.7)
NEUTROPHILS NFR BLD AUTO: 45.6 %
NONHDLC SERPL-MCNC: 98 MG/DL (ref ?–130)
OSMOLALITY SERPL CALC.SUM OF ELEC: 296 MOSM/KG (ref 275–295)
PLATELET # BLD AUTO: 316 10(3)UL (ref 150–450)
POTASSIUM SERPL-SCNC: 3.8 MMOL/L (ref 3.5–5.1)
PROT SERPL-MCNC: 6.7 G/DL (ref 5.7–8.2)
RBC # BLD AUTO: 4.14 X10(6)UL
SODIUM SERPL-SCNC: 143 MMOL/L (ref 136–145)
TRIGL SERPL-MCNC: 237 MG/DL (ref 30–149)
VLDLC SERPL CALC-MCNC: 35 MG/DL (ref 0–30)
WBC # BLD AUTO: 7.4 X10(3) UL (ref 4–11)

## 2024-06-06 PROCEDURE — 85025 COMPLETE CBC W/AUTO DIFF WBC: CPT

## 2024-06-06 PROCEDURE — 80061 LIPID PANEL: CPT

## 2024-06-06 PROCEDURE — 36415 COLL VENOUS BLD VENIPUNCTURE: CPT

## 2024-06-06 PROCEDURE — 82570 ASSAY OF URINE CREATININE: CPT

## 2024-06-06 PROCEDURE — 80053 COMPREHEN METABOLIC PANEL: CPT

## 2024-06-06 PROCEDURE — 82043 UR ALBUMIN QUANTITATIVE: CPT

## 2024-06-10 ENCOUNTER — OFFICE VISIT (OUTPATIENT)
Dept: INTERNAL MEDICINE CLINIC | Facility: CLINIC | Age: 74
End: 2024-06-10

## 2024-06-10 VITALS
BODY MASS INDEX: 27.71 KG/M2 | HEART RATE: 84 BPM | SYSTOLIC BLOOD PRESSURE: 119 MMHG | OXYGEN SATURATION: 98 % | TEMPERATURE: 98 F | DIASTOLIC BLOOD PRESSURE: 70 MMHG | WEIGHT: 156.38 LBS | HEIGHT: 63 IN

## 2024-06-10 DIAGNOSIS — R07.89 INTERMITTENT LEFT-SIDED CHEST PAIN: ICD-10-CM

## 2024-06-10 DIAGNOSIS — H40.9 GLAUCOMA, UNSPECIFIED GLAUCOMA TYPE, UNSPECIFIED LATERALITY: ICD-10-CM

## 2024-06-10 DIAGNOSIS — M81.0 AGE-RELATED OSTEOPOROSIS WITHOUT CURRENT PATHOLOGICAL FRACTURE: ICD-10-CM

## 2024-06-10 DIAGNOSIS — K86.2 PANCREATIC CYST (HCC): ICD-10-CM

## 2024-06-10 DIAGNOSIS — R05.3 CHRONIC COUGH: ICD-10-CM

## 2024-06-10 DIAGNOSIS — E78.5 HYPERLIPIDEMIA ASSOCIATED WITH TYPE 2 DIABETES MELLITUS (HCC): ICD-10-CM

## 2024-06-10 DIAGNOSIS — Z01.00 DIABETIC EYE EXAM (HCC): ICD-10-CM

## 2024-06-10 DIAGNOSIS — E11.59 HYPERTENSION ASSOCIATED WITH TYPE 2 DIABETES MELLITUS (HCC): ICD-10-CM

## 2024-06-10 DIAGNOSIS — Z12.11 COLON CANCER SCREENING: ICD-10-CM

## 2024-06-10 DIAGNOSIS — Z86.000 HISTORY OF DUCTAL CARCINOMA IN SITU (DCIS) OF BREAST: ICD-10-CM

## 2024-06-10 DIAGNOSIS — E11.9 DIABETIC EYE EXAM (HCC): ICD-10-CM

## 2024-06-10 DIAGNOSIS — E11.9 TYPE 2 DIABETES MELLITUS WITHOUT COMPLICATION, WITHOUT LONG-TERM CURRENT USE OF INSULIN (HCC): ICD-10-CM

## 2024-06-10 DIAGNOSIS — I15.2 HYPERTENSION ASSOCIATED WITH TYPE 2 DIABETES MELLITUS (HCC): ICD-10-CM

## 2024-06-10 DIAGNOSIS — Z00.00 MEDICARE ANNUAL WELLNESS VISIT, SUBSEQUENT: Primary | ICD-10-CM

## 2024-06-10 DIAGNOSIS — I70.0 ATHEROSCLEROSIS OF AORTA (HCC): ICD-10-CM

## 2024-06-10 DIAGNOSIS — E11.69 HYPERLIPIDEMIA ASSOCIATED WITH TYPE 2 DIABETES MELLITUS (HCC): ICD-10-CM

## 2024-06-10 DIAGNOSIS — E55.9 VITAMIN D DEFICIENCY: ICD-10-CM

## 2024-06-10 PROBLEM — E53.8 VITAMIN B12 DEFICIENCY: Status: RESOLVED | Noted: 2020-05-09 | Resolved: 2024-06-10

## 2024-06-10 NOTE — PROGRESS NOTES
Subjective:   Beatriz Rosario is a 74 year old female who presents for a Medicare Subsequent Annual Wellness visit (Pt already had Initial Annual Wellness) and scheduled follow up of multiple significant but stable problems.       History/Other:   Fall Risk Assessment:   She has been screened for Falls and is low risk.      Cognitive Assessment:   She had a completely normal cognitive assessment - see flowsheet entries     Functional Ability/Status:   Beatriz Rosario has a completely normal functional assessment. See flowsheet for details.      Depression Screening (PHQ-2/PHQ-9): PHQ-2 SCORE: 0  , done 6/10/2024   If you checked off any problems, how difficult have these problems made it for you to do your work, take care of things at home, or get along with other people?: Not difficult at all             Advanced Directives:   She does NOT have a Living Will. [Do you have a living will?: No]  She does NOT have a Power of  for Health Care. [Do you have a healthcare power of ?: No]  Discussed Advance Care Planning with patient (and family/surrogate if present). Standard forms made available to patient in After Visit Summary.      Patient Active Problem List   Diagnosis    Allergic rhinitis    Hypertension associated with type 2 diabetes mellitus (HCC)    Type 2 diabetes mellitus without complication, without long-term current use of insulin (HCC)    Vitamin D deficiency    Glaucoma    Atherosclerosis of aorta (HCC)    Colon cancer screening    Hyperlipidemia associated with type 2 diabetes mellitus (HCC)    Chronic cough    Intermittent left-sided chest pain    Diabetic eye exam (HCC)    History of ductal carcinoma in situ (DCIS) of breast    Age-related osteoporosis without current pathological fracture    Pancreatic cyst (HCC)    Medicare annual wellness visit, subsequent     Allergies:  She is allergic to latex.    Current Medications:  Outpatient Medications Marked as Taking for the 6/10/24  encounter (Office Visit) with Minh Morrison MD   Medication Sig    atorvastatin 40 MG Oral Tab Take 1 tablet (40 mg total) by mouth nightly.    hydroCHLOROthiazide 12.5 MG Oral Tab Take 1 tablet (12.5 mg total) by mouth 2 (two) times daily.    montelukast 10 MG Oral Tab Take 1 tablet (10 mg total) by mouth nightly.    losartan 50 MG Oral Tab Take 1 tablet (50 mg total) by mouth 2 (two) times daily.    amLODIPine 2.5 MG Oral Tab Take 1 tablet (2.5 mg total) by mouth in the morning and 1 tablet (2.5 mg total) before bedtime.    PANTOPRAZOLE SODIUM 40 MG Oral Tab EC TAKE 1 TABLET(40 MG) BY MOUTH EVERY MORNING BEFORE BREAKFAST    latanoprost (XALATAN) 0.005 % Ophthalmic Solution     Calcium Carbonate-Vitamin D 600-200 MG-UNIT Oral Cap Take  by mouth. 1 po q12hrs.    aspirin 81 MG Oral Tab EC Take  by mouth. take 1 tablet (81MG)  by ORAL route  every day    Multiple Vitamin (MULTI-DAY) Oral Tab Take  by mouth.       Medical History:  She  has a past medical history of Allergic rhinitis (3/15/2011), Cataracts, bilateral, Diabetes (HCC), Diabetes mellitus, type II (HCC) (2012), Ductal carcinoma in situ of right breast (2010), Endometrial thickening on ultra sound (8/11/2014), Glaucoma, right eye, Lumbar arthropathy (6/12/2018), Osteoarthritis, Other and unspecified hyperlipidemia, Positive PPD (2005), Unspecified essential hypertension, Vitamin B12 deficiency anemia (1/3/2008), and Vitamin D deficiency (10/31/2011).  Surgical History:  She  has a past surgical history that includes lumpectomy right (08/06/2010); colonoscopy (2008); biopsy of breast, needle core (Bilateral, 2011); laparoscopy,vag hysterectomy (09/26/2016); radiation right (2011); Breast biopsy (Right, 2001); needle biopsy left (Left, 08/12/2011); hysterectomy (2016); colonoscopy (06/2021); and colonoscopy (N/A, 06/25/2021).   Family History:  Her family history includes Breast Cancer in her maternal aunt; Breast Cancer (age of onset: 50) in her  sister; Breast Cancer (age of onset: 60) in her self; Cancer in her sister; DCIS (age of onset: 60) in her self; Heart Disease (age of onset: 77) in her mother; Heart Disease (age of onset: 82) in her father; Heart Disorder in her father and mother; Hypertension in her mother; Hypertension (age of onset: 82) in her father; Musculo-skelatal Disorder in her mother; Stroke (age of onset: 84) in her father.  Social History:  She  reports that she has never smoked. She has never been exposed to tobacco smoke. She has never used smokeless tobacco. She reports that she does not drink alcohol and does not use drugs.    Tobacco:  She has never smoked tobacco.    CAGE Alcohol Screen:   CAGE screening score of 0 on 6/10/2024, showing low risk of alcohol abuse.      Patient Care Team:  Minh Morrison MD as PCP - General (Internal Medicine)  Sara De Guzman MD (Internal Medicine)    Review of Systems  GENERAL: feels well otherwise  SKIN: denies any unusual skin lesions  EYES: denies blurred vision or double vision  HEENT: denies nasal congestion, sinus pain or ST  LUNGS: denies shortness of breath with exertion  CARDIOVASCULAR: denies chest pain on exertion  GI: denies abdominal pain, denies heartburn  : denies dysuria, vaginal discharge or itching, no complaint of urinary incontinence   MUSCULOSKELETAL: denies back pain  NEURO: denies headaches  PSYCHE: denies depression or anxiety  HEMATOLOGIC: denies hx of anemia  ENDOCRINE: denies thyroid history  ALL/ASTHMA: denies hx of allergy or asthma    Objective:   Physical Exam  General Appearance:  Alert, cooperative, no distress, appears stated age   Head:  Normocephalic, without obvious abnormality, atraumatic   Eyes:  PERRL, conjunctiva/corneas clear, EOM's intact both eyes   Ears:  Normal TM's and external ear canals, both ears   Nose: Nares normal, septum midline,mucosa normal, no drainage or sinus tenderness   Throat: Lips, mucosa, and tongue normal; teeth and gums  normal   Neck: Supple, symmetrical, trachea midline, no adenopathy;  thyroid: not enlarged, symmetric, no tenderness/mass/nodules; no carotid bruit or JVD   Back:   Symmetric, no curvature, ROM normal, no CVA tenderness   Lungs:   Clear to auscultation bilaterally, respirations unlabored   Heart:  Regular rate and rhythm, S1 and S2 normal, no murmur, rub, or gallop   Abdomen:   Soft, non-tender, bowel sounds active all four quadrants,  no masses, no organomegaly   Pelvic: Deferred   Extremities: Extremities normal, atraumatic, no cyanosis or edema ;monofilament testing normal both feet   Pulses: 2+ and symmetric   Skin: Skin color, texture, turgor normal, no rashes or lesions   Lymph nodes: Cervical, supraclavicular,  nodes normal   Neurologic: Normal       /70 (BP Location: Left arm, Patient Position: Sitting, Cuff Size: large)   Pulse 84   Temp 98.2 °F (36.8 °C) (Tympanic)   Ht 5' 3\" (1.6 m)   Wt 156 lb 6.4 oz (70.9 kg)   SpO2 98%   BMI 27.71 kg/m²  Estimated body mass index is 27.71 kg/m² as calculated from the following:    Height as of this encounter: 5' 3\" (1.6 m).    Weight as of this encounter: 156 lb 6.4 oz (70.9 kg).    Medicare Hearing Assessment:   Hearing Screening    Time taken: 6/10/2024 10:13 AM  Screening Method: Questionnaire  I have a problem hearing over the telephone: No I have trouble following the conversations when two or more people are talking at the same time: No   I have trouble understanding things on the TV: No I have to strain to understand conversations: No   I have to worry about missing the telephone ring or doorbell: No I have trouble hearing conversations in a noisy background such as a crowded room or restaurant: Yes   I get confused about where sounds come from: No I misunderstand some words in a sentence and need to ask people to repeat themselves: Sometimes   I especially have trouble understanding the speech of women and children: No I have trouble understanding  the speaker in a large room such as at a meeting or place of Jew: No   Many people I talk to seem to mumble (or don't speak clearly): No People get annoyed because I misunderstand what they say: No   I misunderstand what others are saying and make inappropriate responses: No I avoid social activities because I cannot hear well and fear I will reply improperly: No   Family members and friends have told me they think I may have hearing loss: No             Visual Acuity:   Right Eye Visual Acuity: Corrected Right Eye Chart Acuity: 20/40   Left Eye Visual Acuity: Corrected Left Eye Chart Acuity: 20/40   Both Eyes Visual Acuity: Corrected Both Eyes Chart Acuity: 20/40   Able To Tolerate Visual Acuity: Yes        Assessment & Plan:   Beatriz Rosario is a 74 year old female who presents for a Medicare Assessment.     (Z00.00) Medicare annual wellness visit, subsequent  (primary encounter diagnosis)  Plan: Recent labs reviewed with the patient.  Patient advised to get her Shingrix vaccine, latest booster for COVID and RSV vaccine.   She is already scheduled for her mammogram.    (E11.59,  I15.2) Hypertension associated with type 2 diabetes mellitus (HCC)  Plan: Blood pressure controlled with current blood pressure meds.  CPM.  Recheck in 6 months.    (E11.69,  E78.5) Hyperlipidemia associated with type 2 diabetes mellitus (HCC)  Plan: Recent lipid panel reviewed.  Total cholesterol and LDL are in good range, triglycerides were elevated so we will continue her cholesterol medication but told to follow strictly a low-fat low-cholesterol diet.    (E11.9) Type 2 diabetes mellitus without complication, without long-term current use of insulin (HCC)  Plan: She had her A1c done last month was 6.2 so her diabetes remains controlled.  Will continue current treatment of diet.    (I70.0) Atherosclerosis of aorta (HCC)  Plan: Patient ready on statin therapy.    (K86.2) Pancreatic cyst (HCC)  Plan: Remains stable on recent  MRI/MRCP.  I told her to follow-up with our gastro so we could be given recommendations as to the need for further surveillance    (M81.0) Age-related osteoporosis without current pathological fracture  Plan: XR DEXA BONE DENSITOMETRY (CPT=77080)        Patient has a history of osteoporosis on her left femoral neck on the last DEXA scan 2 years ago.  She says she already had obtained dental clearance and we discussed about treatment with bisphosphonate therapy.  However I told her to repeat bone density test so we have update updated and once it is back, we will plan to start Fosamax if DEXA scan still shows that.  She has osteoporosis.    (E55.9) Vitamin D deficiency  Plan: Continue vitamin D supplement.    (Z86.000) History of ductal carcinoma in situ (DCIS) of breast  Plan: Remote history of lumpectomy followed by adjuvant hormonal therapy which she completed.  Has been no signs of recurrence for breast cancer. Pt already scheduled for her annual screening mammogram .    (Z01.00,  E11.9) Diabetic eye exam (HCC)  Plan: Patient ready has an appointment to see her ophthalmologist Dr. Mock.    (Z12.11) Colon cancer screening  Plan: Patient had her last colonoscopy 2022 was ready told by her GI Dr. Robbins that no further screening colonoscopies is needed at this point given her age and colonoscopy only showing diverticulosis.    (R07.89) Intermittent left-sided chest pain  Plan: Cardio Referral - Internal        Patient still complaining of intermittent left sided chest pains and exertional and started atypical.  She had a negative troponin D-dimer and a chest x-ray when I saw her for this complaint a few weeks ago.  EKG also was unremarkable.  She also had a normal CT angiogram about 2 years ago of the heart.  I told her she could follow-up with her cardiologist and patient given referral.    (H40.9) Glaucoma, unspecified glaucoma type, unspecified laterality  Plan: Continue follow-up with her ophthalmologist.   Patient is on latanoprost eyedrops.    (R05.3) Chronic cough  Plan: Complete PFT w/Bronchodilator/Albuterol 2.5        Patient complains of having chronic cough which has been going on for more than a year according to the patient.  She is already on allergy medications which should cover upper airway syndrome.  I told her to get a pulmonary function test to rule out asthma.  She takes pantoprazole only as needed however I told her GERD can be a possible cause of her cough so needs to take this on a regular basis.     The patient indicates understanding of these issues and agrees to the plan.  Reinforced healthy diet, lifestyle, and exercise.      No follow-ups on file.     Minh Morrison MD, 6/10/2024     Supplementary Documentation:   General Health:  In the past six months, have you lost more than 10 pounds without trying?: 2 - No  Has your appetite been poor?: No  Type of Diet: Balanced  How does the patient maintain a good energy level?: Stretching  How would you describe your daily physical activity?: Moderate  How would you describe your current health state?: Fair  How do you maintain positive mental well-being?: Social Interaction  On a scale of 0 to 10, with 0 being no pain and 10 being severe pain, what is your pain level?: 3 - (Mild)  In the past six months, have you experienced urine leakage?: 0-No  At any time do you feel concerned for the safety/well-being of yourself and/or your children, in your home or elsewhere?: No  Have you had any immunizations at another office such as Influenza, Hepatitis B, Tetanus, or Pneumococcal?: No       Beatriz Rosario's SCREENING SCHEDULE   Tests on this list are recommended by your physician but may not be covered, or covered at this frequency, by your insurer.   Please check with your insurance carrier before scheduling to verify coverage.   PREVENTATIVE SERVICES FREQUENCY &  COVERAGE DETAILS LAST COMPLETION DATE   Diabetes Screening    Fasting Blood Sugar  /  Glucose    One screening every 12 months if never tested or if previously tested but not diagnosed with pre-diabetes   One screening every 6 months if diagnosed with pre-diabetes Lab Results   Component Value Date     (H) 06/06/2024        Cardiovascular Disease Screening    Lipid Panel  Cholesterol  Lipoprotein (HDL)  Triglycerides Covered every 5 years for all Medicare beneficiaries without apparent signs or symptoms of cardiovascular disease Lab Results   Component Value Date    CHOLEST 152 06/06/2024    HDL 54 06/06/2024    LDL 60 06/06/2024    TRIG 237 (H) 06/06/2024         Electrocardiogram (EKG)   Covered if needed at Welcome to Medicare, and non-screening if indicated for medical reasons 05/25/2024      Ultrasound Screening for Abdominal Aortic Aneurysm (AAA) Covered once in a lifetime for one of the following risk factors    Men who are 65-75 years old and have ever smoked    Anyone with a family history -     Colorectal Cancer Screening  Covered for ages 50-85; only need ONE of the following:    Colonoscopy   Covered every 10 years    Covered every 2 years if patient is at high risk or previous colonoscopy was abnormal 06/25/2021    Health Maintenance   Topic Date Due    Colorectal Cancer Screening  06/25/2031       Flexible Sigmoidoscopy   Covered every 4 years -    Fecal Occult Blood Test Covered annually -   Bone Density Screening    Bone density screening    Covered every 2 years after age 65 if diagnosed with risk of osteoporosis or estrogen deficiency.    Covered yearly for long-term glucocorticoid medication use (Steroids) Last Dexa Scan:    XR DEXA BONE DENSITOMETRY (CPT=77080) 07/11/2022      No recommendations at this time   Pap and Pelvic    Pap   Covered every 2 years for women at normal risk; Annually if at high risk -  No recommendations at this time    Chlamydia Annually if high risk -  No recommendations at this time   Screening Mammogram    Mammogram     Recommend annually for  all female patients aged 40 and older    One baseline mammogram covered for patients aged 35-39 05/02/2023    Health Maintenance   Topic Date Due    Mammogram  05/02/2024       Immunizations    Influenza Covered once per flu season  Please get every year 10/18/2023  No recommendations at this time    Pneumococcal Each vaccine ([id number] & [id number]) covered once after 65 Prevnar 13: 11/14/2016    [id number]: 05/30/2018     No recommendations at this time    Hepatitis B One screening covered for patients with certain risk factors   -  No recommendations at this time    Tetanus Toxoid Not covered by Medicare Part B unless medically necessary (cut with metal); may be covered with your pharmacy prescription benefits -    Tetanus, Diptheria and Pertusis TD and TDaP Not covered by Medicare Part B -  No recommendations at this time    Zoster Not covered by Medicare Part B; may be covered with your pharmacy  prescription benefits 11/09/2013  Zoster Vaccines(2 of 3) due on 01/04/2014     Diabetes      Hemoglobin A1C Annually; if last result is elevated, may be asked to retest more frequently.    Medicare covers every 3 months Lab Results   Component Value Date     (H) 10/15/2022    A1C 6.2 (A) 05/25/2024       No recommendations at this time    Creat/alb ratio Annually Lab Results   Component Value Date    MICROALBCREA 9.7 06/06/2024       LDL Annually Lab Results   Component Value Date    LDL 60 06/06/2024       Dilated Eye Exam Annually Last Diabetic Eye Exam:  No data recorded  No data recorded       Annual Monitoring of Persistent Medications (ACE/ARB, digoxin diuretics, anticonvulsants)    Potassium Annually Lab Results   Component Value Date    K 3.8 06/06/2024         Creatinine   Annually Lab Results   Component Value Date    CREATSERUM 0.67 06/06/2024         BUN Annually Lab Results   Component Value Date    BUN 13 06/06/2024       Drug Serum Conc Annually No results found for: \"DIGOXIN\", \"DIG\", \"VALP\"

## 2024-06-24 ENCOUNTER — HOSPITAL ENCOUNTER (OUTPATIENT)
Dept: MAMMOGRAPHY | Facility: HOSPITAL | Age: 74
Discharge: HOME | End: 2024-06-24
Attending: INTERNAL MEDICINE

## 2024-06-24 ENCOUNTER — HOSPITAL ENCOUNTER (OUTPATIENT)
Dept: ULTRASOUND IMAGING | Facility: HOSPITAL | Age: 74
Discharge: HOME | End: 2024-06-24
Attending: INTERNAL MEDICINE

## 2024-06-24 DIAGNOSIS — N64.4 BREAST PAIN, LEFT: ICD-10-CM

## 2024-06-24 PROCEDURE — 76642 ULTRASOUND BREAST LIMITED: CPT | Performed by: INTERNAL MEDICINE

## 2024-06-24 PROCEDURE — 77066 DX MAMMO INCL CAD BI: CPT | Performed by: INTERNAL MEDICINE

## 2024-06-24 PROCEDURE — 77062 BREAST TOMOSYNTHESIS BI: CPT | Performed by: INTERNAL MEDICINE

## 2024-07-31 ENCOUNTER — TELEPHONE (OUTPATIENT)
Dept: INTERNAL MEDICINE CLINIC | Facility: CLINIC | Age: 74
End: 2024-07-31

## 2024-07-31 ENCOUNTER — TELEPHONE (OUTPATIENT)
Dept: ORTHOPEDICS CLINIC | Facility: CLINIC | Age: 74
End: 2024-07-31

## 2024-07-31 DIAGNOSIS — N63.13 MASS OF LOWER OUTER QUADRANT OF RIGHT BREAST: Primary | ICD-10-CM

## 2024-07-31 NOTE — TELEPHONE ENCOUNTER
Patient is requesting referral.     Name of specialist and specialty department : Dr. Licha Lind, Breast surgery   Reason for visit with the specialist: results of Ultrasound and mammogram of breast  Address of the specialist office: [place]   Appointment date: none         CSS informed patient the turnaround time for referral is 5-7 business days.  Patient was informed to check their [facility] account for referral status.

## 2024-07-31 NOTE — TELEPHONE ENCOUNTER
Spoke with patient and scheduled her for appointment on 8/5/24 at 4 pm. Location and directions confirmed

## 2024-08-05 ENCOUNTER — OFFICE VISIT (OUTPATIENT)
Dept: ORTHOPEDICS CLINIC | Facility: CLINIC | Age: 74
End: 2024-08-05
Payer: MEDICARE

## 2024-08-05 VITALS — SYSTOLIC BLOOD PRESSURE: 120 MMHG | DIASTOLIC BLOOD PRESSURE: 62 MMHG | HEART RATE: 78 BPM

## 2024-08-05 DIAGNOSIS — M75.41 SUBACROMIAL IMPINGEMENT OF RIGHT SHOULDER: ICD-10-CM

## 2024-08-05 DIAGNOSIS — S43.431S GLENOID LABRAL TEAR, RIGHT, SEQUELA: ICD-10-CM

## 2024-08-05 DIAGNOSIS — M75.121 NONTRAUMATIC COMPLETE TEAR OF RIGHT ROTATOR CUFF: ICD-10-CM

## 2024-08-05 DIAGNOSIS — M75.81 TENDINITIS OF RIGHT ROTATOR CUFF: Primary | ICD-10-CM

## 2024-08-05 RX ORDER — TRIAMCINOLONE ACETONIDE 40 MG/ML
40 INJECTION, SUSPENSION INTRA-ARTICULAR; INTRAMUSCULAR ONCE
Status: COMPLETED | OUTPATIENT
Start: 2024-08-05 | End: 2024-08-05

## 2024-08-05 RX ADMIN — TRIAMCINOLONE ACETONIDE 40 MG: 40 INJECTION, SUSPENSION INTRA-ARTICULAR; INTRAMUSCULAR at 17:50:00

## 2024-08-05 NOTE — PROGRESS NOTES
Per verbal order from Dr. Li, draw up 5ml of 0.5% Marcaine and 1ml of Kenalog 40 for cortisone injection to right shoulder Leeroy GAMBLE MA  Patient provided education handout for cortisone injection.

## 2024-08-05 NOTE — PROGRESS NOTES
NURSING INTAKE COMMENTS:   Chief Complaint   Patient presents with    Shoulder Pain     F/u right shoulder pain 6/10. got cortisone injection on 04 01/2024. Pain returned a month ago. Here for possible cortisone injection.       HPI: This 74 year old female presents today for persistent right shoulder pain and some decreased motion since our last visit in April 2024.  Over the years we have been injecting cortisone.  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  She lives by herself and does not really have any family in the area.  Unfortunately MRI April 2024 when compared to the 2022 MRI shows the rotator cuff is now torn completely and retracted to almost the glenohumeral joint.  There is some atrophy of the muscle.  Since her last visit she said she lost a little motion.  Her concern about surgery is that she has no one to help care for her while she is recovering and she cannot work for many months.    She is diabetic but currently off medications.  She takes glipizide only for cortisone injections.    Past Medical History:    Allergic rhinitis    Cataracts, bilateral    Diabetes (HCC)    Diabetes mellitus, type II (HCC)    Ductal carcinoma in situ of right breast    Endometrial thickening on ultra sound    Glaucoma, right eye    Lumbar arthropathy    Osteoarthritis    Other and unspecified hyperlipidemia    Positive PPD    S/P INH prophylaxis for 6 months     Unspecified essential hypertension    Vitamin B12 deficiency anemia    Vitamin D deficiency     Past Surgical History:   Procedure Laterality Date    Biopsy of breast, needle core Bilateral 2011    Breast biopsy Right 2001    [facility]    Colonoscopy  2008    Colonoscopy  06/2021    Colonoscopy N/A 06/25/2021    Procedure: COLONOSCOPY,;  Surgeon: Dirk Robbins MD;  Location: [facility], [facility]    Hysterectomy  2016    Laparoscopy,vag hysterectomy  09/26/2016    w/ BSO, benign    Lumpectomy right  08/06/2010    Needle biopsy left Left 08/12/2011     Radiation right  2011    breast     Current Outpatient Medications   Medication Sig Dispense Refill    atorvastatin 40 MG Oral Tab Take 1 tablet (40 mg total) by mouth nightly. 90 tablet 3    Glucose Blood (TRUE METRIX BLOOD GLUCOSE TEST) In Vitro Strip 1 strip by In Vitro route daily. 100 strip 11    Lancets Does not apply Misc Test once daily 100 each 11    hydroCHLOROthiazide 12.5 MG Oral Tab Take 1 tablet (12.5 mg total) by mouth 2 (two) times daily. 180 tablet 3    montelukast 10 MG Oral Tab Take 1 tablet (10 mg total) by mouth nightly. 30 tablet 3    losartan 50 MG Oral Tab Take 1 tablet (50 mg total) by mouth 2 (two) times daily. 180 tablet 3    fluticasone propionate (FLOVENT HFA) 110 MCG/ACT Inhalation Aerosol Inhale 2 puffs into the lungs 2 (two) times daily. 1 each 0    amLODIPine 2.5 MG Oral Tab Take 1 tablet (2.5 mg total) by mouth in the morning and 1 tablet (2.5 mg total) before bedtime. 180 tablet 3    Blood Glucose Monitoring Suppl (TRUE METRIX AIR GLUCOSE METER) w/Device Does not apply Kit Use to test Blood sugar daily 1 kit 0    ibuprofen 200 MG Oral Tab Take 0.5 tablets (100 mg total) by mouth as needed for Pain.      PANTOPRAZOLE SODIUM 40 MG Oral Tab EC TAKE 1 TABLET(40 MG) BY MOUTH EVERY MORNING BEFORE BREAKFAST 90 tablet 0    latanoprost (XALATAN) 0.005 % Ophthalmic Solution   3    Calcium Carbonate-Vitamin D 600-200 MG-UNIT Oral Cap Take  by mouth. 1 po q12hrs.      aspirin 81 MG Oral Tab EC Take  by mouth. take 1 tablet (81MG)  by ORAL route  every day      Multiple Vitamin (MULTI-DAY) Oral Tab Take  by mouth.      Meloxicam 7.5 MG Oral Tab Take 1 tablet (7.5 mg total) by mouth daily as needed for Pain. (Patient not taking: Reported on 8/5/2024) 90 tablet 1     Allergies   Allergen Reactions    Latex      Family History   Problem Relation Age of Onset    Stroke Father 84    Hypertension Father 82        myocardial infarction    Heart Disease Father 82        CAD    Heart Disorder Father      Heart Disease Mother 77        CAD    Hypertension Mother         mycardial infarction    Musculo-skelatal Disorder Mother         osteoporosis    Heart Disorder Mother     Breast Cancer Sister 50        chemo; doing fine    Cancer Sister         breast cancer    Breast Cancer Maternal Aunt         post    Breast Cancer Self 59    DCIS Self 60     No family Hx of DVT/PE    Social History     Occupational History    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     Employer: [employer]   Tobacco Use    Smoking status: Never     Passive exposure: Never    Smokeless tobacco: Never   Vaping Use    Vaping status: Never Used   Substance and Sexual Activity    Alcohol use: No     Alcohol/week: 0.0 standard drinks of alcohol    Drug use: No    Sexual activity: Not on file        Review of Systems:  GENERAL: feels generally well, no significant weight loss or weight gain  SKIN: no ulcerated or worrisome skin lesions  EYES:denies blurred vision or double vision  HEENT: denies new nasal congestion, sinus pain or ST  LUNGS: denies shortness of breath  CARDIOVASCULAR: denies chest pain  GI: no hematemesis, no worsening heartburn, no diarrhea  : no dysuria, no blood in urine, no difficulty urinating, no incontinence  MUSCULOSKELETAL: no other musculoskeletal complaints other than in HPI  NEURO: no numbness or tingling, no weakness or balance disorder  PSYCHE: no depression or anxiety  HEMATOLOGIC: no hx of blood dyscrasia, no Hx DVT/PE  ENDOCRINE: no thyroid or diabetes issues  ALL/ASTHMA: no new hx of severe allergy or asthma    Physical Examination:    /62 (BP Location: Left arm, Patient Position: Sitting, Cuff Size: adult)   Pulse 78   Constitutional: appears well hydrated, alert and responsive, no acute distress noted  Extremities: The contours of the right shoulder were symmetrically normal to the left.  Musculoskeletal: Active motion was 140 degrees and passive motion 160 degrees, about the  same as her last visit.  She has surprisingly good resistance to external rotation even though the rotator cuff is torn.  Normal subscapularis.  No deltoid tendinitis and no acromioclavicular symptoms.  Neurological: Normal motor and sensory right upper extremity.    Imaging: MRI shows a full-thickness rotator cuff tear.  The biceps has mild tendinitis.  The labral tear is unchanged.  The rotator cuff is the chief pathology that has progressed since last MRI.      No results found.     Lab Results   Component Value Date    WBC 7.4 06/06/2024    HGB 12.5 06/06/2024    .0 06/06/2024      Lab Results   Component Value Date     (H) 06/06/2024    BUN 13 06/06/2024    CREATSERUM 0.67 06/06/2024    GFRNAA 92 01/08/2022    GFRAA 106 01/08/2022        Assessment and Plan:  Diagnoses and all orders for this visit:    Tendinitis of right rotator cuff  -     PHYSICAL THERAPY - INTERNAL  -     Arthrocentesis aspiration and injection major Right joint bursa w/o US  -     triamcinolone acetonide (Kenalog-40) 40 MG/ML injection 40 mg    Subacromial impingement of right shoulder  -     PHYSICAL THERAPY - INTERNAL  -     Arthrocentesis aspiration and injection major Right joint bursa w/o US  -     triamcinolone acetonide (Kenalog-40) 40 MG/ML injection 40 mg    Nontraumatic complete tear of right rotator cuff  -     PHYSICAL THERAPY - INTERNAL  -     Arthrocentesis aspiration and injection major Right joint bursa w/o US  -     triamcinolone acetonide (Kenalog-40) 40 MG/ML injection 40 mg    Glenoid labral tear, right, sequela  -     PHYSICAL THERAPY - INTERNAL  -     Arthrocentesis aspiration and injection major Right joint bursa w/o US  -     triamcinolone acetonide (Kenalog-40) 40 MG/ML injection 40 mg        Assessment: Above diagnoses.    Plan: We talked about surgical repair.  I let her know that after some time, I would not be able to repair the rotator cuff.  Currently she is not interested in surgery and wants  to try another injection and therapy.  Again we talked about cortisone elevated her glucose levels temporarily and she knows what to do.    Aspiration was negative she tolerated the injection bupivacaine 0.5% 5 cc in, 1 cc well to the right shoulder.  She will do the home exercise program that she learns in therapy.  If she was to discuss surgery further, told to make another appointment but I did warn her that after several months, we may not be able to repair the tendon.    Follow Up: No follow-ups on file.    Tim Li MD

## 2024-08-07 NOTE — TELEPHONE ENCOUNTER
Dr. Morrison,     Patient called requesting referral to Dr. Lind.     Please provide DX on referral.     Pended referral please review diagnosis and sign off if you agree.    Thank you.  Katherine Manzo  [facility]

## 2024-08-08 ENCOUNTER — OFFICE VISIT (OUTPATIENT)
Dept: OTOLARYNGOLOGY | Facility: CLINIC | Age: 74
End: 2024-08-08
Payer: MEDICARE

## 2024-08-08 DIAGNOSIS — K21.00 GASTROESOPHAGEAL REFLUX DISEASE WITH ESOPHAGITIS, UNSPECIFIED WHETHER HEMORRHAGE: ICD-10-CM

## 2024-08-08 DIAGNOSIS — J34.2 NASAL SEPTAL DEVIATION: ICD-10-CM

## 2024-08-08 DIAGNOSIS — R05.3 CHRONIC COUGH: ICD-10-CM

## 2024-08-08 DIAGNOSIS — G47.30 SLEEP APNEA, UNSPECIFIED TYPE: Primary | ICD-10-CM

## 2024-08-08 RX ORDER — OMEPRAZOLE 40 MG/1
40 CAPSULE, DELAYED RELEASE ORAL NIGHTLY
Qty: 30 CAPSULE | Refills: 0 | Status: SHIPPED | OUTPATIENT
Start: 2024-08-08 | End: 2024-09-07

## 2024-08-27 RX ORDER — AMLODIPINE BESYLATE 2.5 MG/1
TABLET ORAL
Qty: 180 TABLET | Refills: 3 | Status: SHIPPED | OUTPATIENT
Start: 2024-08-27

## 2024-08-27 NOTE — TELEPHONE ENCOUNTER
Refill passed per [facility] protocol.  Requested Prescriptions   Pending Prescriptions Disp Refills    AMLODIPINE 2.5 MG Oral Tab [Pharmacy Med Name: AMLODIPINE BESYLATE 2.5MG TABLETS] 180 tablet 3     Sig: TAKE 1 TABLET BY MOUTH IN THE AM AND 1 TABLET BEFORE BEDTIME       Hypertension Medications Protocol Passed - 8/26/2024  5:47 AM        Passed - CMP or BMP in past 12 months        Passed - Last BP reading less than 140/90     BP Readings from Last 1 Encounters:   08/05/24 120/62               Passed - In person appointment or virtual visit in the past 12 mos or appointment in next 3 mos     Recent Outpatient Visits              2 weeks ago Sleep apnea, unspecified type    [facility] Frederick Ybarra MD    Office Visit    3 weeks ago Tendinitis of right rotator cuff    [facility] Tim Li MD    Office Visit    2 months ago Medicare annual wellness visit, subsequent    [facility] Minh Morrison MD    Office Visit    2 months ago Nontraumatic complete tear of right rotator cuff    [facility] Linda Chase PA    Office Visit    3 months ago Other chest pain    [facility] Minh Morrison MD    Office Visit          Future Appointments         Provider Department Appt Notes    In 1 week ADO KEARA RM1; ADO DEXA RM1 [facility] ERIN - [place]     In 1 month Licha Lind MD [facility], [facility] CONSULT   Referred by Minh Morrison MD   Reason  Mass of lower outer quadrant of right breast   PATIENT WORKING ON REFERRAL-AWARE WE WILL R/S IF NO REFERRAL                    Passed - EGFRCR or GFRNAA > 50     GFR Evaluation  EGFRCR: 92 , resulted on 6/6/2024             Recent Outpatient Visits              2 weeks ago Sleep apnea, unspecified type     [facility] Frederick Ybarra MD    Office Visit    3 weeks ago Tendinitis of right rotator cuff    [place] Tim Li MD    Office Visit    2 months ago Medicare annual wellness visit, subsequent    [facility] Minh Morrison MD    Office Visit    2 months ago Nontraumatic complete tear of right rotator cuff    [facility] [place] Linda Chase PA    Office Visit    3 months ago Other chest pain    [facility] Minh Morrison MD    Office Visit          Future Appointments         Provider Department Appt Notes    In 1 week ADO KEARA RM1; ADO DEXA RM1 [facility] ERIN Hughes     In 1 month Licha Lind MD [facility], [facility] CONSULT   Referred by Minh Morrison MD   Reason  Mass of lower outer quadrant of right breast   PATIENT WORKING ON REFERRAL-AWARE WE WILL R/S IF NO REFERRAL

## 2024-09-25 ENCOUNTER — LAB ENCOUNTER (OUTPATIENT)
Dept: LAB | Age: 74
End: 2024-09-25
Attending: STUDENT IN AN ORGANIZED HEALTH CARE EDUCATION/TRAINING PROGRAM
Payer: MEDICARE

## 2024-09-25 ENCOUNTER — OFFICE VISIT (OUTPATIENT)
Dept: INTERNAL MEDICINE CLINIC | Facility: CLINIC | Age: 74
End: 2024-09-25
Payer: MEDICARE

## 2024-09-25 VITALS
SYSTOLIC BLOOD PRESSURE: 106 MMHG | DIASTOLIC BLOOD PRESSURE: 58 MMHG | WEIGHT: 151 LBS | HEART RATE: 83 BPM | BODY MASS INDEX: 26.75 KG/M2 | HEIGHT: 63 IN

## 2024-09-25 DIAGNOSIS — R10.13 EPIGASTRIC PAIN: Primary | ICD-10-CM

## 2024-09-25 DIAGNOSIS — R19.7 DIARRHEA, UNSPECIFIED TYPE: ICD-10-CM

## 2024-09-25 LAB — IGA SERPL-MCNC: 285.6 MG/DL (ref 70–312)

## 2024-09-25 PROCEDURE — 3008F BODY MASS INDEX DOCD: CPT | Performed by: STUDENT IN AN ORGANIZED HEALTH CARE EDUCATION/TRAINING PROGRAM

## 2024-09-25 PROCEDURE — 99215 OFFICE O/P EST HI 40 MIN: CPT | Performed by: STUDENT IN AN ORGANIZED HEALTH CARE EDUCATION/TRAINING PROGRAM

## 2024-09-25 PROCEDURE — 1160F RVW MEDS BY RX/DR IN RCRD: CPT | Performed by: STUDENT IN AN ORGANIZED HEALTH CARE EDUCATION/TRAINING PROGRAM

## 2024-09-25 PROCEDURE — 82784 ASSAY IGA/IGD/IGG/IGM EACH: CPT

## 2024-09-25 PROCEDURE — 36415 COLL VENOUS BLD VENIPUNCTURE: CPT

## 2024-09-25 PROCEDURE — 3078F DIAST BP <80 MM HG: CPT | Performed by: STUDENT IN AN ORGANIZED HEALTH CARE EDUCATION/TRAINING PROGRAM

## 2024-09-25 PROCEDURE — 86364 TISS TRNSGLTMNASE EA IG CLAS: CPT

## 2024-09-25 PROCEDURE — 1159F MED LIST DOCD IN RCRD: CPT | Performed by: STUDENT IN AN ORGANIZED HEALTH CARE EDUCATION/TRAINING PROGRAM

## 2024-09-25 PROCEDURE — 3074F SYST BP LT 130 MM HG: CPT | Performed by: STUDENT IN AN ORGANIZED HEALTH CARE EDUCATION/TRAINING PROGRAM

## 2024-09-25 RX ORDER — SUCRALFATE 1 G/1
1 TABLET ORAL
Qty: 360 TABLET | Refills: 0 | Status: SHIPPED | OUTPATIENT
Start: 2024-09-25 | End: 2024-12-24

## 2024-09-25 RX ORDER — PANTOPRAZOLE SODIUM 40 MG/1
40 TABLET, DELAYED RELEASE ORAL
Qty: 90 TABLET | Refills: 0 | Status: SHIPPED | OUTPATIENT
Start: 2024-09-25

## 2024-09-25 NOTE — PROGRESS NOTES
OFFICE NOTE     Patient ID: Beatriz Rosario is a 74 year old female.  Today's Date: 09/25/24  Chief Complaint: Stomach Pain (1 week Stomach pain bloating/gas with some loose stool)    Pt is a 73y/o female with Pmhx of HTN, HLD, T2DM, Atherosclerosis of aorta, pancreatic cyst,osteoprosis, vitamin d def, History fo DCIS, recently returned from [facility] September 12th 2024, and eating more spicy foods, no nuts no seeds. Pt has been taking Panoprazole 40mg chronically. (GI Dr. Dirk pandey). And states she has symptoms after eating eat or drinking green tea.       Stomach Pain  This is a new problem. The current episode started 1 to 4 weeks ago. The onset quality is gradual. The problem occurs constantly. The problem has been gradually worsening. The pain is located in the epigastric region. The pain is at a severity of 6/10. The pain is moderate. The quality of the pain is burning. The abdominal pain does not radiate. Associated symptoms include diarrhea, frequency and nausea. Pertinent negatives include no anorexia, arthralgias, constipation, dysuria, fever, flatus, headaches, hematochezia, hematuria, melena, myalgias, vomiting or weight loss.         Vitals:    09/25/24 1312   BP: 106/58   Pulse: 83   Weight: 151 lb (68.5 kg)   Height: 5' 3\" (1.6 m)     body mass index is 26.75 kg/m².  BP Readings from Last 3 Encounters:   09/25/24 106/58   08/05/24 120/62   06/10/24 119/70     The 10-year ASCVD risk score (Diego LUCIO, et al., 2019) is: 21.2%    Values used to calculate the score:      Age: 74 years      Sex: Female      Is Non- : No      Diabetic: Yes      Tobacco smoker: No      Systolic Blood Pressure: 106 mmHg      Is BP treated: Yes      HDL Cholesterol: 54 mg/dL      Total Cholesterol: 152 mg/dL      Medications reviewed:  Current Outpatient Medications   Medication Sig Dispense Refill    sucralfate (CARAFATE) 1 g Oral Tab Take 1 tablet (1 g total) by mouth 4 (four)  times daily before meals and nightly. 360 tablet 0    pantoprazole 40 MG Oral Tab EC Take 1 tablet (40 mg total) by mouth before breakfast. 90 tablet 0    amLODIPine 2.5 MG Oral Tab TAKE 1 TABLET BY MOUTH IN THE AM AND 1 TABLET BEFORE BEDTIME. 180 tablet 3    atorvastatin 40 MG Oral Tab Take 1 tablet (40 mg total) by mouth nightly. 90 tablet 3    Glucose Blood (TRUE METRIX BLOOD GLUCOSE TEST) In Vitro Strip 1 strip by In Vitro route daily. 100 strip 11    Lancets Does not apply Misc Test once daily 100 each 11    hydroCHLOROthiazide 12.5 MG Oral Tab Take 1 tablet (12.5 mg total) by mouth 2 (two) times daily. 180 tablet 3    montelukast 10 MG Oral Tab Take 1 tablet (10 mg total) by mouth nightly. 30 tablet 3    losartan 50 MG Oral Tab Take 1 tablet (50 mg total) by mouth 2 (two) times daily. 180 tablet 3    fluticasone propionate (FLOVENT HFA) 110 MCG/ACT Inhalation Aerosol Inhale 2 puffs into the lungs 2 (two) times daily. 1 each 0    Blood Glucose Monitoring Suppl (TRUE METRIX AIR GLUCOSE METER) w/Device Does not apply Kit Use to test Blood sugar daily 1 kit 0    Calcium Carbonate-Vitamin D 600-200 MG-UNIT Oral Cap Take  by mouth. 1 po q12hrs.      aspirin 81 MG Oral Tab EC Take  by mouth. take 1 tablet (81MG)  by ORAL route  every day      Multiple Vitamin (MULTI-DAY) Oral Tab Take  by mouth.      Meloxicam 7.5 MG Oral Tab Take 1 tablet (7.5 mg total) by mouth daily as needed for Pain. 90 tablet 1    ibuprofen 200 MG Oral Tab Take 0.5 tablets (100 mg total) by mouth as needed for Pain.      latanoprost (XALATAN) 0.005 % Ophthalmic Solution  (Patient not taking: Reported on 9/25/2024)  3         Assessment & Plan    1. Epigastric pain (Primary)  -     Gastro Referral - In Network  -     Sucralfate; Take 1 tablet (1 g total) by mouth 4 (four) times daily before meals and nightly.  Dispense: 360 tablet; Refill: 0  -     Pantoprazole Sodium; Take 1 tablet (40 mg total) by mouth before breakfast.  Dispense: 90 tablet;  Refill: 0  -     H. Pylori Stool Ag, EIA; Future; Expected date: 2024  2. Diarrhea, unspecified type  -     Gastro Referral - In Network  -     CELIAC DISEASE SCREEN Reflex; Future; Expected date: 2024  -     Calprotectin, Fecal; Future; Expected date: 2024  -     Ova and Parasites(P); Future; Expected date: 2024  -     Giardia + Crypto Antigen, Stool; Future; Expected date: 2024  -     Stool Culture W/Shigatoxin; Future; Expected date: 2024  -     C. diff toxigenic PCR (OPT); Future; Expected date: 2024  -     Norovirus, RT PCR; Future; Expected date: 2024    Diarrhea, unspecified type--check for celiac disease, check chemistry panel for protein levels and electrolytes and state of hydration and Calprotectin for signs of inflammation, will notify patient via portal    -Stool culture, ova and parasites (recent travel within past month),   -check for celiac,  -check Stool H pylori  -continue PPI, and start carate if h pylori negative.   -Drink plenty of fluids  - Pray diet for the next several days    Follow Up: As needed/if symptoms worsen or Return in about 2 weeks (around 10/9/2024), or if symptoms worsen or fail to improve..     I spent 41 minutes obtaining pertitent medical history, reviewing pertinent imaging/labs and specialists notes, evaluating patient, discussing differential diagnosis' and various treatment options, reinforcing importance of compliance with treatment plan, and completing documentation.     Encounter Times  PreChartin minutes    Reviewing/Obtainin minutes      Medical Exam: 4 minutes    Plan: 5 minutes      Notes: 9 minutes    Counseling/Education: 6 minutes      Referring/Communicating:   minutes    Ind Interpretation:   minutes      Care Coordination:   minutes       My total time spent caring for the patient on the day of the encounter: 41 minutes.     Objective/ Results:   Physical Exam  Constitutional:       Appearance: She is  well-developed.   Cardiovascular:      Rate and Rhythm: Normal rate and regular rhythm.      Heart sounds: Normal heart sounds.   Pulmonary:      Effort: Pulmonary effort is normal.      Breath sounds: Normal breath sounds.   Abdominal:      General: Bowel sounds are normal. There is no distension.      Palpations: Abdomen is soft. There is no mass.   Skin:     General: Skin is warm and dry.   Neurological:      Mental Status: She is alert and oriented to person, place, and time.      Deep Tendon Reflexes: Reflexes are normal and symmetric.        Reviewed:    Patient Active Problem List    Diagnosis    Chronic cough    Intermittent left-sided chest pain    Diabetic eye exam ([facility])    History of ductal carcinoma in situ (DCIS) of breast    Age-related osteoporosis without current pathological fracture    Pancreatic cyst ([facility])    Medicare annual wellness visit, subsequent    Hyperlipidemia associated with type 2 diabetes mellitus ([facility])    Colon cancer screening    Atherosclerosis of aorta ([facility])     Ct abd/pelvis 8/2015      Glaucoma    Type 2 diabetes mellitus without complication, without long-term current use of insulin ([facility])    Vitamin D deficiency    Allergic rhinitis    Hypertension associated with type 2 diabetes mellitus ([facility])      Allergies   Allergen Reactions    Latex         Social History     Socioeconomic History    Marital status:     Number of children: 0   Occupational History    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     Employer: [employer]   Tobacco Use    Smoking status: Never     Passive exposure: Never    Smokeless tobacco: Never   Vaping Use    Vaping status: Never Used   Substance and Sexual Activity    Alcohol use: No     Alcohol/week: 0.0 standard drinks of alcohol    Drug use: No   Other Topics Concern    Caffeine Concern Yes     Comment: 2 cups of coffee per day      Social Determinants of Health      [facility]      Review of Systems   Constitutional: Negative.  Negative for fever and weight loss.   Respiratory: Negative.     Cardiovascular: Negative.    Gastrointestinal:  Positive for diarrhea and nausea. Negative for anorexia, constipation, flatus, hematochezia, melena and vomiting.   Genitourinary:  Positive for frequency. Negative for dysuria and hematuria.   Musculoskeletal:  Negative for arthralgias and myalgias.   Skin: Negative.    Neurological: Negative.  Negative for headaches.     All other systems negative unless otherwise stated in ROS or HPI above.       Zack Ellison MD  Internal Medicine       Call office with any questions or seek emergency care if necessary.   Patient understands and agrees to follow directions and advice.      ----------------------------------------- PATIENT INSTRUCTIONS-----------------------------------------     There are no Patient Instructions on file for this visit.        The 21st Century Cures Act makes medical notes available to patients in the interest of transparency.  However, please be advised that this is a medical document.  It is intended as a peer to peer communication.  It is written in medical language and may contain abbreviations or verbiage that are technical and unfamiliar.  It may appear blunt or direct.  Medical documents are intended to carry relevant information, facts as evident, and the clinical opinion of the practitioner.

## 2024-09-28 LAB — TTG IGA SER-ACNC: 0.4 U/ML (ref ?–7)

## 2024-09-28 NOTE — PROGRESS NOTES
No action if read by pt:    Andrade Paul. Co,   Your Celiac initial screening is normal, I am waiting for the confirmatory Testing to also result but this should be normal. Please make sure to follow up with gastroenterology for your chronic symptoms.  -Dr. Ellison

## 2024-10-04 ENCOUNTER — LAB ENCOUNTER (OUTPATIENT)
Dept: LAB | Age: 74
End: 2024-10-04
Attending: STUDENT IN AN ORGANIZED HEALTH CARE EDUCATION/TRAINING PROGRAM
Payer: MEDICARE

## 2024-10-08 DIAGNOSIS — A04.8 H. PYLORI INFECTION: Primary | ICD-10-CM

## 2024-10-08 RX ORDER — PANTOPRAZOLE SODIUM 40 MG/1
40 TABLET, DELAYED RELEASE ORAL
Qty: 28 TABLET | Refills: 0 | Status: SHIPPED | OUTPATIENT
Start: 2024-10-08 | End: 2024-10-22

## 2024-10-08 RX ORDER — CLARITHROMYCIN 500 MG
500 TABLET ORAL 2 TIMES DAILY
Qty: 28 TABLET | Refills: 0 | Status: SHIPPED | OUTPATIENT
Start: 2024-10-08 | End: 2024-10-22

## 2024-10-08 RX ORDER — AMOXICILLIN 500 MG/1
1000 CAPSULE ORAL 2 TIMES DAILY
Qty: 56 CAPSULE | Refills: 0 | Status: SHIPPED | OUTPATIENT
Start: 2024-10-08 | End: 2024-10-22

## 2024-10-10 ENCOUNTER — LAB ENCOUNTER (OUTPATIENT)
Dept: LAB | Age: 74
End: 2024-10-10
Attending: STUDENT IN AN ORGANIZED HEALTH CARE EDUCATION/TRAINING PROGRAM
Payer: MEDICARE

## 2024-10-10 ENCOUNTER — OFFICE VISIT (OUTPATIENT)
Dept: INTERNAL MEDICINE CLINIC | Facility: CLINIC | Age: 74
End: 2024-10-10
Payer: MEDICARE

## 2024-10-10 VITALS
HEART RATE: 86 BPM | SYSTOLIC BLOOD PRESSURE: 114 MMHG | DIASTOLIC BLOOD PRESSURE: 63 MMHG | WEIGHT: 150 LBS | BODY MASS INDEX: 27 KG/M2

## 2024-10-10 DIAGNOSIS — R73.03 PRE-DIABETES: ICD-10-CM

## 2024-10-10 DIAGNOSIS — E03.9 HYPOTHYROIDISM, UNSPECIFIED TYPE: ICD-10-CM

## 2024-10-10 DIAGNOSIS — D50.9 IRON DEFICIENCY ANEMIA, UNSPECIFIED IRON DEFICIENCY ANEMIA TYPE: ICD-10-CM

## 2024-10-10 DIAGNOSIS — M79.604 BILATERAL LEG AND FOOT PAIN: Primary | ICD-10-CM

## 2024-10-10 DIAGNOSIS — M79.672 BILATERAL LEG AND FOOT PAIN: ICD-10-CM

## 2024-10-10 DIAGNOSIS — M79.604 BILATERAL LEG AND FOOT PAIN: ICD-10-CM

## 2024-10-10 DIAGNOSIS — M79.672 BILATERAL LEG AND FOOT PAIN: Primary | ICD-10-CM

## 2024-10-10 DIAGNOSIS — Z23 FLU VACCINE NEED: ICD-10-CM

## 2024-10-10 DIAGNOSIS — M79.671 BILATERAL LEG AND FOOT PAIN: Primary | ICD-10-CM

## 2024-10-10 DIAGNOSIS — M79.671 BILATERAL LEG AND FOOT PAIN: ICD-10-CM

## 2024-10-10 DIAGNOSIS — M79.605 BILATERAL LEG AND FOOT PAIN: ICD-10-CM

## 2024-10-10 DIAGNOSIS — A04.8 H. PYLORI INFECTION: ICD-10-CM

## 2024-10-10 DIAGNOSIS — I73.9 PAD (PERIPHERAL ARTERY DISEASE) (HCC): ICD-10-CM

## 2024-10-10 DIAGNOSIS — M79.605 BILATERAL LEG AND FOOT PAIN: Primary | ICD-10-CM

## 2024-10-10 LAB
DEPRECATED HBV CORE AB SER IA-ACNC: 335 NG/ML
EST. AVERAGE GLUCOSE BLD GHB EST-MCNC: 134 MG/DL (ref 68–126)
HBA1C MFR BLD: 6.3 % (ref ?–5.7)
IRON SATN MFR SERPL: 17 %
IRON SERPL-MCNC: 66 UG/DL
MAGNESIUM SERPL-MCNC: 2.2 MG/DL (ref 1.6–2.6)
TIBC SERPL-MCNC: 399 UG/DL (ref 250–425)
TRANSFERRIN SERPL-MCNC: 268 MG/DL (ref 250–380)
TSI SER-ACNC: 0.66 MIU/ML (ref 0.55–4.78)
VIT B12 SERPL-MCNC: 555 PG/ML (ref 211–911)

## 2024-10-10 PROCEDURE — 82550 ASSAY OF CK (CPK): CPT

## 2024-10-10 PROCEDURE — 1160F RVW MEDS BY RX/DR IN RCRD: CPT | Performed by: STUDENT IN AN ORGANIZED HEALTH CARE EDUCATION/TRAINING PROGRAM

## 2024-10-10 PROCEDURE — 83540 ASSAY OF IRON: CPT

## 2024-10-10 PROCEDURE — 36415 COLL VENOUS BLD VENIPUNCTURE: CPT

## 2024-10-10 PROCEDURE — 3074F SYST BP LT 130 MM HG: CPT | Performed by: STUDENT IN AN ORGANIZED HEALTH CARE EDUCATION/TRAINING PROGRAM

## 2024-10-10 PROCEDURE — 3078F DIAST BP <80 MM HG: CPT | Performed by: STUDENT IN AN ORGANIZED HEALTH CARE EDUCATION/TRAINING PROGRAM

## 2024-10-10 PROCEDURE — G0008 ADMIN INFLUENZA VIRUS VAC: HCPCS | Performed by: STUDENT IN AN ORGANIZED HEALTH CARE EDUCATION/TRAINING PROGRAM

## 2024-10-10 PROCEDURE — 83735 ASSAY OF MAGNESIUM: CPT

## 2024-10-10 PROCEDURE — 99214 OFFICE O/P EST MOD 30 MIN: CPT | Performed by: STUDENT IN AN ORGANIZED HEALTH CARE EDUCATION/TRAINING PROGRAM

## 2024-10-10 PROCEDURE — 86140 C-REACTIVE PROTEIN: CPT

## 2024-10-10 PROCEDURE — 86225 DNA ANTIBODY NATIVE: CPT

## 2024-10-10 PROCEDURE — 83036 HEMOGLOBIN GLYCOSYLATED A1C: CPT

## 2024-10-10 PROCEDURE — 84443 ASSAY THYROID STIM HORMONE: CPT

## 2024-10-10 PROCEDURE — 82728 ASSAY OF FERRITIN: CPT

## 2024-10-10 PROCEDURE — 82607 VITAMIN B-12: CPT

## 2024-10-10 PROCEDURE — 90662 IIV NO PRSV INCREASED AG IM: CPT | Performed by: STUDENT IN AN ORGANIZED HEALTH CARE EDUCATION/TRAINING PROGRAM

## 2024-10-10 PROCEDURE — 86038 ANTINUCLEAR ANTIBODIES: CPT

## 2024-10-10 PROCEDURE — 84466 ASSAY OF TRANSFERRIN: CPT

## 2024-10-10 PROCEDURE — 1159F MED LIST DOCD IN RCRD: CPT | Performed by: STUDENT IN AN ORGANIZED HEALTH CARE EDUCATION/TRAINING PROGRAM

## 2024-10-10 RX ORDER — GABAPENTIN 100 MG/1
100 CAPSULE ORAL 3 TIMES DAILY PRN
Qty: 270 CAPSULE | Refills: 0 | Status: SHIPPED | OUTPATIENT
Start: 2024-10-10 | End: 2025-01-08

## 2024-10-10 NOTE — PROGRESS NOTES
OFFICE NOTE     Patient ID: Beatriz Fisher Co is a 74 year old female.  Today's Date: 10/10/24  Chief Complaint: Pain (B/l leg and toe cramping, with pain and some pins and needles)    Pt is a 75y/o female with Pmhx of HTN, HLD, T2DM, Atherosclerosis of aorta, pancreatic cyst,osteoprosis, vitamin d def, History fo DCIS, whom presents to clinic after recent H Pylori test positive on 10/4 on triple therapy and also has bilateral leg cramping pain, and numbness from legs/foot to poterior thigh.   Worse at night and am .          Vitals:    10/10/24 1021   BP: 114/63   Pulse: 86   Weight: 150 lb (68 kg)     body mass index is 26.57 kg/m².  BP Readings from Last 3 Encounters:   10/10/24 114/63   09/25/24 106/58   08/05/24 120/62     The 10-year ASCVD risk score (Diego LUCIO, et al., 2019) is: 24.1%    Values used to calculate the score:      Age: 74 years      Sex: Female      Is Non- : No      Diabetic: Yes      Tobacco smoker: No      Systolic Blood Pressure: 114 mmHg      Is BP treated: Yes      HDL Cholesterol: 54 mg/dL      Total Cholesterol: 152 mg/dL      Medications reviewed:  Current Outpatient Medications   Medication Sig Dispense Refill    gabapentin 100 MG Oral Cap Take 1 capsule (100 mg total) by mouth 3 (three) times daily as needed. 270 capsule 0    clarithromycin 500 MG Oral Tab Take 1 tablet (500 mg total) by mouth 2 (two) times daily for 14 days. 28 tablet 0    pantoprazole 40 MG Oral Tab EC Take 1 tablet (40 mg total) by mouth 2 (two) times daily before meals for 14 days. 28 tablet 0    amoxicillin 500 MG Oral Cap Take 2 capsules (1,000 mg total) by mouth 2 (two) times daily for 14 days. 56 capsule 0    sucralfate (CARAFATE) 1 g Oral Tab Take 1 tablet (1 g total) by mouth 4 (four) times daily before meals and nightly. 360 tablet 0    amLODIPine 2.5 MG Oral Tab TAKE 1 TABLET BY MOUTH IN THE AM AND 1 TABLET BEFORE BEDTIME. 180 tablet 3    atorvastatin 40 MG Oral Tab Take  1 tablet (40 mg total) by mouth nightly. 90 tablet 3    Glucose Blood (TRUE METRIX BLOOD GLUCOSE TEST) In Vitro Strip 1 strip by In Vitro route daily. 100 strip 11    Lancets Does not apply Misc Test once daily 100 each 11    hydroCHLOROthiazide 12.5 MG Oral Tab Take 1 tablet (12.5 mg total) by mouth 2 (two) times daily. 180 tablet 3    montelukast 10 MG Oral Tab Take 1 tablet (10 mg total) by mouth nightly. 30 tablet 3    losartan 50 MG Oral Tab Take 1 tablet (50 mg total) by mouth 2 (two) times daily. 180 tablet 3    fluticasone propionate (FLOVENT HFA) 110 MCG/ACT Inhalation Aerosol Inhale 2 puffs into the lungs 2 (two) times daily. 1 each 0    Blood Glucose Monitoring Suppl (TRUE METRIX AIR GLUCOSE METER) w/Device Does not apply Kit Use to test Blood sugar daily 1 kit 0    Calcium Carbonate-Vitamin D 600-200 MG-UNIT Oral Cap Take  by mouth. 1 po q12hrs.      aspirin 81 MG Oral Tab EC Take  by mouth. take 1 tablet (81MG)  by ORAL route  every day      Multiple Vitamin (MULTI-DAY) Oral Tab Take  by mouth.      Meloxicam 7.5 MG Oral Tab Take 1 tablet (7.5 mg total) by mouth daily as needed for Pain. 90 tablet 1    ibuprofen 200 MG Oral Tab Take 0.5 tablets (100 mg total) by mouth as needed for Pain.      latanoprost (XALATAN) 0.005 % Ophthalmic Solution  (Patient not taking: Reported on 10/10/2024)  3         Assessment & Plan    1. Bilateral leg and foot pain (Primary)  -     US ARTERIAL DUPLEX LOWER EXTREMITY BILATERAL (CPT=93925); Future; Expected date: 10/10/2024  -     Iron And Tibc; Future; Expected date: 10/10/2024  -     Ferritin; Future; Expected date: 10/10/2024  -     Vitamin B12; Future; Expected date: 10/10/2024  -     TSH W Reflex To Free T4; Future; Expected date: 10/10/2024  -     Hemoglobin A1C; Future; Expected date: 10/10/2024  -     Magnesium; Future; Expected date: 10/10/2024  -     Gabapentin; Take 1 capsule (100 mg total) by mouth 3 (three) times daily as needed.  Dispense: 270 capsule;  Refill: 0  Pt with bilateral leg and foot pain, DDX PAD, restless leg syndrome, vitamin Def, iron def, mag def  Plan  -arterial duplex to rule out PAD  -Check Iron/TIBC/Ferritin/TSH, Vitamin B12, A1C, Magnesium (if deranged, treat underlying condition  -start gabapentin 100mg po TID PRN for nerve pain    2. PAD (peripheral artery disease) (HCC)  -     US ARTERIAL DUPLEX LOWER EXTREMITY BILATERAL (CPT=93925); Future; Expected date: 10/10/2024  Pt with bilateral leg and foot pain, DDX PAD, restless leg syndrome, vitamin Def, iron def, mag def  Plan  -arterial duplex to rule out PAD  -Check Iron/TIBC/Ferritin/TSH, Vitamin B12, A1C, Magnesium (if deranged, treat underlying condition  -start gabapentin 100mg po TID PRN for nerve pain    3. Hypothyroidism, unspecified type  -     TSH W Reflex To Free T4; Future; Expected date: 10/10/2024  Pt with bilateral leg and foot pain, DDX PAD, restless leg syndrome, vitamin Def, iron def, mag def  Plan  -arterial duplex to rule out PAD  -Check Iron/TIBC/Ferritin/TSH, Vitamin B12, A1C, Magnesium (if deranged, treat underlying condition  -start gabapentin 100mg po TID PRN for nerve pain    4. Iron deficiency anemia, unspecified iron deficiency anemia type  -     Iron And Tibc; Future; Expected date: 10/10/2024  -     Ferritin; Future; Expected date: 10/10/2024  Pt with bilateral leg and foot pain, DDX PAD, restless leg syndrome, vitamin Def, iron def, mag def  Plan  -arterial duplex to rule out PAD  -Check Iron/TIBC/Ferritin/TSH, Vitamin B12, A1C, Magnesium (if deranged, treat underlying condition  -start gabapentin 100mg po TID PRN for nerve pain    5. Flu vaccine need  -     High Dose Fluzone trivalent influenza, 65yrs+ PFS (63616)  Flu vaccine given in clinic    6. Pre-diabetes  -     Hemoglobin A1C; Future; Expected date: 10/10/2024  Check A1C if above 6.5 might need to consider starting metformin ER 500mg daily    7. H. pylori infection  -     H. Pylori Stool Ag, EIA; Future;  Expected date: 11/10/2024  Continue triple therapy, recheck stool testing in 4 weeks and follow up afterwards for clearance, should still see GI      Follow Up: As needed/if symptoms worsen or Return in about 4 weeks (around 2024) for H pyrlori re-test..     I spent 38 minutes obtaining pertitent medical history, reviewing pertinent imaging/labs and specialists notes, evaluating patient, discussing differential diagnosis' and various treatment options, reinforcing importance of compliance with treatment plan, and completing documentation.     Encounter Times  PreChartin minutes    Reviewing/Obtaining: 10 minutes      Medical Exam: 3 minutes    Plan: 4 minutes      Notes: 8 minutes    Counseling/Education: 6 minutes      Referring/Communicating:   minutes    Ind Interpretation:   minutes      Care Coordination: 2 minutes       My total time spent caring for the patient on the day of the encounter: 38 minutes.        Objective/ Results:   Physical Exam  Constitutional:       Appearance: She is well-developed.   Cardiovascular:      Rate and Rhythm: Normal rate and regular rhythm.      Heart sounds: Normal heart sounds.   Pulmonary:      Effort: Pulmonary effort is normal.      Breath sounds: Normal breath sounds.   Abdominal:      General: Bowel sounds are normal.      Palpations: Abdomen is soft.   Skin:     General: Skin is warm and dry.   Neurological:      Mental Status: She is alert and oriented to person, place, and time.      Deep Tendon Reflexes: Reflexes are normal and symmetric.        Reviewed:    Patient Active Problem List    Diagnosis    Chronic cough    Intermittent left-sided chest pain    Diabetic eye exam (HCC)    History of ductal carcinoma in situ (DCIS) of breast    Age-related osteoporosis without current pathological fracture    Pancreatic cyst (HCC)    Medicare annual wellness visit, subsequent    Hyperlipidemia associated with type 2 diabetes mellitus (HCC)    Colon cancer screening     Atherosclerosis of aorta (HCC)     Ct abd/pelvis 8/2015      Glaucoma    Type 2 diabetes mellitus without complication, without long-term current use of insulin (HCC)    Vitamin D deficiency    Allergic rhinitis    Hypertension associated with type 2 diabetes mellitus (HCC)      Allergies[1]     Social History     Socioeconomic History    Marital status:     Number of children: 0   Occupational History    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     Employer: [employer]   Tobacco Use    Smoking status: Never     Passive exposure: Never    Smokeless tobacco: Never   Vaping Use    Vaping status: Never Used   Substance and Sexual Activity    Alcohol use: No     Alcohol/week: 0.0 standard drinks of alcohol    Drug use: No   Other Topics Concern    Caffeine Concern Yes     Comment: 2 cups of coffee per day      Social Drivers of Health      Received from [employer], [employer]    [facility] [employer]      Review of Systems   Constitutional: Negative.    Respiratory: Negative.     Cardiovascular: Negative.    Gastrointestinal:  Positive for abdominal distention.   Musculoskeletal:  Positive for arthralgias and gait problem.   Skin: Negative.    Neurological:  Positive for weakness.     All other systems negative unless otherwise stated in ROS or HPI above.       Zack Ellison MD  Internal Medicine       Call office with any questions or seek emergency care if necessary.   Patient understands and agrees to follow directions and advice.      ----------------------------------------- PATIENT INSTRUCTIONS-----------------------------------------     There are no Patient Instructions on file for this visit.        The 21st Century Cures Act makes medical notes available to patients in the interest of transparency.  However, please be advised that this is a medical document.  It is intended as a peer to peer communication.  It is written in medical language and may contain abbreviations or  verbiage that are technical and unfamiliar.  It may appear blunt or direct.  Medical documents are intended to carry relevant information, facts as evident, and the clinical opinion of the practitioner.          [1]   Allergies  Allergen Reactions    Latex

## 2024-10-11 DIAGNOSIS — M79.671 BILATERAL LEG AND FOOT PAIN: Primary | ICD-10-CM

## 2024-10-11 DIAGNOSIS — M79.672 BILATERAL LEG AND FOOT PAIN: Primary | ICD-10-CM

## 2024-10-11 DIAGNOSIS — M79.604 BILATERAL LEG AND FOOT PAIN: Primary | ICD-10-CM

## 2024-10-11 DIAGNOSIS — M79.605 BILATERAL LEG AND FOOT PAIN: Primary | ICD-10-CM

## 2024-10-11 LAB
CK SERPL-CCNC: 156 U/L
CRP SERPL-MCNC: <0.4 MG/DL (ref ?–1)
DSDNA IGG SERPL IA-ACNC: <0.6 IU/ML
ENA AB SER QL IA: <0.09 UG/L
ENA AB SER QL IA: NEGATIVE

## 2024-10-11 NOTE — PROGRESS NOTES
Dr. Terra Santacruz here, I have reviewed your labs here are your recommendations:    # Diabetes/A1C: Your A1C Last A1c value was 6.3% done 10/10/2024. which shows  prediabetes. This does not require medications unless your A1C reaches greater than 6.5, but if you would like to start medications to prevent the progression to diabetes please let me know. I would recommend increasing exercise and/or reducing your intake of carbohydrates, pastas, sweets, and sugary drinks/etc, drink more water, eat more vegetables instead of fruit, and try to lose 10% of your current bodyweight.  We will recheck your A1C in 3 months, please schedule a follow up office visit so we can recheck your A1C in the office using a finger stick test. You do not need to fast. If you prefer a video visit let me know so we can order a lab draw A1C/metabolic panel to be completed 3 days prior to our visit.     # TSH: Your thyroid (TSH) function is normal.     #Electrolytes: Magnesium is normal, Iron panel is normal with chronically elevated ferritin (inflammation)    I am wondering if you might have a autoimmune cause of your symptoms, with elevated ferritin. I have ordered a MEDHAT, CK, CRP are inflammatory blood markers and will comment once those result    If you have any questions or concerns in regards to these labs please schedule a follow up and will gladly discuss.   -Dr. Ellison

## 2024-10-12 NOTE — PROGRESS NOTES
Also relay to pt    Andrade Henderson,     Your Creatine kinase is also elevated (explain muscule aches) but negative MEDHAT, ESR are reassuring. Stay well hydrated and recommend rechecking Creatine Kinase at next follow up  -Dr. Ellison

## 2024-11-09 ENCOUNTER — LAB ENCOUNTER (OUTPATIENT)
Dept: LAB | Age: 74
End: 2024-11-09
Attending: STUDENT IN AN ORGANIZED HEALTH CARE EDUCATION/TRAINING PROGRAM
Payer: MEDICARE

## 2024-11-14 ENCOUNTER — OFFICE VISIT (OUTPATIENT)
Dept: INTERNAL MEDICINE CLINIC | Facility: CLINIC | Age: 74
End: 2024-11-14
Payer: MEDICARE

## 2024-11-14 VITALS
DIASTOLIC BLOOD PRESSURE: 58 MMHG | WEIGHT: 153 LBS | SYSTOLIC BLOOD PRESSURE: 99 MMHG | HEIGHT: 63 IN | HEART RATE: 84 BPM | BODY MASS INDEX: 27.11 KG/M2

## 2024-11-14 DIAGNOSIS — I70.90 ATHEROSCLEROSIS: ICD-10-CM

## 2024-11-14 DIAGNOSIS — M79.672 BILATERAL LEG AND FOOT PAIN: ICD-10-CM

## 2024-11-14 DIAGNOSIS — M79.605 BILATERAL LEG AND FOOT PAIN: ICD-10-CM

## 2024-11-14 DIAGNOSIS — K21.00 GASTROESOPHAGEAL REFLUX DISEASE WITH ESOPHAGITIS WITHOUT HEMORRHAGE: ICD-10-CM

## 2024-11-14 DIAGNOSIS — M79.604 BILATERAL LEG AND FOOT PAIN: ICD-10-CM

## 2024-11-14 DIAGNOSIS — M79.671 BILATERAL LEG AND FOOT PAIN: ICD-10-CM

## 2024-11-14 DIAGNOSIS — I73.9 PAD (PERIPHERAL ARTERY DISEASE) (HCC): Primary | ICD-10-CM

## 2024-11-14 PROCEDURE — 3008F BODY MASS INDEX DOCD: CPT | Performed by: STUDENT IN AN ORGANIZED HEALTH CARE EDUCATION/TRAINING PROGRAM

## 2024-11-14 PROCEDURE — 99214 OFFICE O/P EST MOD 30 MIN: CPT | Performed by: STUDENT IN AN ORGANIZED HEALTH CARE EDUCATION/TRAINING PROGRAM

## 2024-11-14 PROCEDURE — 1160F RVW MEDS BY RX/DR IN RCRD: CPT | Performed by: STUDENT IN AN ORGANIZED HEALTH CARE EDUCATION/TRAINING PROGRAM

## 2024-11-14 PROCEDURE — 3044F HG A1C LEVEL LT 7.0%: CPT | Performed by: STUDENT IN AN ORGANIZED HEALTH CARE EDUCATION/TRAINING PROGRAM

## 2024-11-14 PROCEDURE — 3078F DIAST BP <80 MM HG: CPT | Performed by: STUDENT IN AN ORGANIZED HEALTH CARE EDUCATION/TRAINING PROGRAM

## 2024-11-14 PROCEDURE — 3074F SYST BP LT 130 MM HG: CPT | Performed by: STUDENT IN AN ORGANIZED HEALTH CARE EDUCATION/TRAINING PROGRAM

## 2024-11-14 PROCEDURE — 1159F MED LIST DOCD IN RCRD: CPT | Performed by: STUDENT IN AN ORGANIZED HEALTH CARE EDUCATION/TRAINING PROGRAM

## 2024-11-14 RX ORDER — OMEPRAZOLE 40 MG/1
40 CAPSULE, DELAYED RELEASE ORAL DAILY
Qty: 90 CAPSULE | Refills: 0 | Status: SHIPPED | OUTPATIENT
Start: 2024-11-14 | End: 2025-02-12

## 2024-11-14 NOTE — PROGRESS NOTES
OFFICE NOTE     Patient ID: Beatriz Fisher Co is a 74 year old female.  Today's Date: 11/14/24  Chief Complaint: Follow - Up (Pt states still having b/l leg and toe pain/cramping)    Pt is a 75y/o female with Pmhx of HTN, HLD, T2DM, Atherosclerosis of aorta, pancreatic cyst,osteoprosis, vitamin d def, History fo DCIS, whom presents with ongoing LE myalgia and leg pain. Taking gabapentin and helping somewhat     Did not get arterial ultrasound yet  Had mildly elevated CK, on statin         Vitals:    11/14/24 1049   BP: 99/58   Pulse: 84   Weight: 153 lb (69.4 kg)   Height: 5' 3\" (1.6 m)     body mass index is 27.1 kg/m².  BP Readings from Last 3 Encounters:   11/14/24 99/58   10/10/24 114/63   09/25/24 106/58     The 10-year ASCVD risk score (Diego LUCIO, et al., 2019) is: 18.7%    Values used to calculate the score:      Age: 74 years      Sex: Female      Is Non- : No      Diabetic: Yes      Tobacco smoker: No      Systolic Blood Pressure: 99 mmHg      Is BP treated: Yes      HDL Cholesterol: 54 mg/dL      Total Cholesterol: 152 mg/dL      Medications reviewed:  Current Outpatient Medications   Medication Sig Dispense Refill    Omeprazole 40 MG Oral Capsule Delayed Release Take 1 capsule (40 mg total) by mouth daily. 90 capsule 0    gabapentin 100 MG Oral Cap Take 1 capsule (100 mg total) by mouth 3 (three) times daily as needed. 270 capsule 0    amLODIPine 2.5 MG Oral Tab TAKE 1 TABLET BY MOUTH IN THE AM AND 1 TABLET BEFORE BEDTIME. 180 tablet 3    atorvastatin 40 MG Oral Tab Take 1 tablet (40 mg total) by mouth nightly. 90 tablet 3    Glucose Blood (TRUE METRIX BLOOD GLUCOSE TEST) In Vitro Strip 1 strip by In Vitro route daily. 100 strip 11    Lancets Does not apply Misc Test once daily 100 each 11    hydroCHLOROthiazide 12.5 MG Oral Tab Take 1 tablet (12.5 mg total) by mouth 2 (two) times daily. 180 tablet 3    montelukast 10 MG Oral Tab Take 1 tablet (10 mg total) by mouth  nightly. (Patient taking differently: Take 1 tablet (10 mg total) by mouth nightly. As needed) 30 tablet 3    losartan 50 MG Oral Tab Take 1 tablet (50 mg total) by mouth 2 (two) times daily. 180 tablet 3    Blood Glucose Monitoring Suppl (TRUE METRIX AIR GLUCOSE METER) w/Device Does not apply Kit Use to test Blood sugar daily 1 kit 0    Calcium Carbonate-Vitamin D 600-200 MG-UNIT Oral Cap Take  by mouth. 1 po q12hrs.      aspirin 81 MG Oral Tab EC Take  by mouth. take 1 tablet (81MG)  by ORAL route  every day      Multiple Vitamin (MULTI-DAY) Oral Tab Take  by mouth.      latanoprost (XALATAN) 0.005 % Ophthalmic Solution  (Patient not taking: Reported on 10/10/2024)  3         Assessment & Plan    1. PAD (peripheral artery disease) (HCC) (Primary)  -     US ARTERIAL DUPLEX LOWER EXTREMITY BILATERAL (CPT=93925); Future; Expected date: 11/14/2024  -     Vascular Surgery - In Network  Pt with atherosclerosis of aorta on cxr on statin, might have PAD vs statin induced myopathy (but recommend evaluation for PAD first   Plan  -obtain arterial ultrasound LE first, if positive follow up with vascular.   -continue with gabapentin TID PRN  -follow up In 4 weeks to discuss if ultrasound negative, if we should change from atorvastatin to rosuvastatin or fenofibrate will discuss at that time    2. Bilateral leg and foot pain  -     US ARTERIAL DUPLEX LOWER EXTREMITY BILATERAL (CPT=93925); Future; Expected date: 11/14/2024  -     Vascular Surgery - In Network  Pt with atherosclerosis of aorta on cxr on statin, might have PAD vs statin induced myopathy (but recommend evaluation for PAD first   Plan  -obtain arterial ultrasound LE first, if positive follow up with vascular.   -continue with gabapentin TID PRN  -follow up In 4 weeks to discuss if ultrasound negative, if we should change from atorvastatin to rosuvastatin or fenofibrate will discuss at that time    3. Atherosclerosis  -     US ARTERIAL DUPLEX LOWER EXTREMITY  BILATERAL (CPT=93925); Future; Expected date: 2024  -     Vascular Surgery - In Network  Pt with atherosclerosis of aorta on cxr on statin, might have PAD vs statin induced myopathy (but recommend evaluation for PAD first   Plan  -obtain arterial ultrasound LE first, if positive follow up with vascular.   -continue with gabapentin TID PRN  -follow up In 4 weeks to discuss if ultrasound negative, if we should change from atorvastatin to rosuvastatin or fenofibrate will discuss at that time    4. Gastroesophageal reflux disease with esophagitis without hemorrhage  -     GASTRO - INTERNAL  -     Omeprazole; Take 1 capsule (40 mg total) by mouth daily.  Dispense: 90 capsule; Refill: 0  Pt with Ongoing GERD< recently treated H pylori and cleared infection  Plan  -start omeprazole 40mg po daily  -follow up with GI for EGD      Follow Up: As needed/if symptoms worsen or Return in about 4 weeks (around 2024) for Leg pain follow up..     I spent 36 minutes obtaining pertitent medical history, reviewing pertinent imaging/labs and specialists notes, evaluating patient, discussing differential diagnosis' and various treatment options, reinforcing importance of compliance with treatment plan, and completing documentation.     Encounter Times  PreChartin minutes    Reviewing/Obtainin minutes      Medical Exam: 4 minutes    Plan: 5 minutes      Notes: 5 minutes    Counseling/Education: 5 minutes      Referring/Communicating:   minutes    Ind Interpretation:   minutes      Care Coordination: 2 minutes       My total time spent caring for the patient on the day of the encounter: 36 minutes.       Objective/ Results:   Physical Exam  Constitutional:       Appearance: She is well-developed.   Cardiovascular:      Rate and Rhythm: Normal rate and regular rhythm.      Heart sounds: Normal heart sounds.   Pulmonary:      Effort: Pulmonary effort is normal.      Breath sounds: Normal breath sounds.   Abdominal:       General: Bowel sounds are normal.      Palpations: Abdomen is soft.   Musculoskeletal:      Right lower leg: Tenderness present.      Left lower leg: Tenderness present.      Right ankle: Tenderness present.      Left ankle: Tenderness present.   Skin:     General: Skin is warm and dry.   Neurological:      Mental Status: She is alert and oriented to person, place, and time.      Deep Tendon Reflexes: Reflexes are normal and symmetric.        Reviewed:    Patient Active Problem List    Diagnosis    Chronic cough    Intermittent left-sided chest pain    Diabetic eye exam ([facility])    History of ductal carcinoma in situ (DCIS) of breast    Age-related osteoporosis without current pathological fracture    Pancreatic cyst ([facility])    Medicare annual wellness visit, subsequent    Hyperlipidemia associated with type 2 diabetes mellitus ([facility])    Colon cancer screening    Atherosclerosis of aorta ([facility])     Ct abd/pelvis 8/2015      Glaucoma    Type 2 diabetes mellitus without complication, without long-term current use of insulin ([facility])    Vitamin D deficiency    Allergic rhinitis    Hypertension associated with type 2 diabetes mellitus ([facility])      Allergies[1]     Social History     Socioeconomic History    Marital status:     Number of children: 0   Occupational History    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     Employer: [employer]   Tobacco Use    Smoking status: Never     Passive exposure: Never    Smokeless tobacco: Never   Vaping Use    Vaping status: Never Used   Substance and Sexual Activity    Alcohol use: No     Alcohol/week: 0.0 standard drinks of alcohol    Drug use: No   Other Topics Concern    Caffeine Concern Yes     Comment: 2 cups of coffee per day      Social Drivers of Health      Received from [employer] [employer]    [facility]      Review of Systems   Constitutional: Negative.    Respiratory: Negative.     Cardiovascular: Negative.    Gastrointestinal: Negative.     Musculoskeletal:  Positive for myalgias.   Skin: Negative.    Neurological: Negative.      All other systems negative unless otherwise stated in ROS or HPI above.       Zack Ellison MD  Internal Medicine       Call office with any questions or seek emergency care if necessary.   Patient understands and agrees to follow directions and advice.      ----------------------------------------- PATIENT INSTRUCTIONS-----------------------------------------     There are no Patient Instructions on file for this visit.        The 21st Century Cures Act makes medical notes available to patients in the interest of transparency.  However, please be advised that this is a medical document.  It is intended as a peer to peer communication.  It is written in medical language and may contain abbreviations or verbiage that are technical and unfamiliar.  It may appear blunt or direct.  Medical documents are intended to carry relevant information, facts as evident, and the clinical opinion of the practitioner.          [1]   Allergies  Allergen Reactions    Latex

## 2024-11-18 ENCOUNTER — HOSPITAL ENCOUNTER (OUTPATIENT)
Dept: ULTRASOUND IMAGING | Facility: HOSPITAL | Age: 74
Discharge: HOME | End: 2024-11-18
Attending: STUDENT IN AN ORGANIZED HEALTH CARE EDUCATION/TRAINING PROGRAM
Payer: MEDICARE

## 2024-11-18 DIAGNOSIS — M79.605 BILATERAL LEG AND FOOT PAIN: ICD-10-CM

## 2024-11-18 DIAGNOSIS — M79.672 BILATERAL LEG AND FOOT PAIN: ICD-10-CM

## 2024-11-18 DIAGNOSIS — I70.90 ATHEROSCLEROSIS: ICD-10-CM

## 2024-11-18 DIAGNOSIS — I73.9 PAD (PERIPHERAL ARTERY DISEASE) (HCC): ICD-10-CM

## 2024-11-18 DIAGNOSIS — M79.671 BILATERAL LEG AND FOOT PAIN: ICD-10-CM

## 2024-11-18 DIAGNOSIS — M79.604 BILATERAL LEG AND FOOT PAIN: ICD-10-CM

## 2024-11-18 PROCEDURE — 93925 LOWER EXTREMITY STUDY: CPT | Performed by: STUDENT IN AN ORGANIZED HEALTH CARE EDUCATION/TRAINING PROGRAM

## 2024-11-19 DIAGNOSIS — M79.605 BILATERAL LEG AND FOOT PAIN: Primary | ICD-10-CM

## 2024-11-19 DIAGNOSIS — M79.604 BILATERAL LEG AND FOOT PAIN: Primary | ICD-10-CM

## 2024-11-19 DIAGNOSIS — M79.671 BILATERAL LEG AND FOOT PAIN: Primary | ICD-10-CM

## 2024-11-19 DIAGNOSIS — M79.672 BILATERAL LEG AND FOOT PAIN: Primary | ICD-10-CM

## 2024-11-19 NOTE — PROGRESS NOTES
Please relay to pt:    Andrade Paul. Co,     Your arterial ultrasound showed minimal atherosclerotic disease of lower legs, please follow up with physical therapy to see if this will improve your symptoms   -Dr. Ellison

## 2024-11-25 ENCOUNTER — OFFICE VISIT (OUTPATIENT)
Facility: CLINIC | Age: 74
End: 2024-11-25
Payer: MEDICARE

## 2024-11-25 VITALS — HEIGHT: 63 IN | WEIGHT: 149 LBS | BODY MASS INDEX: 26.4 KG/M2 | RESPIRATION RATE: 20 BRPM

## 2024-11-25 DIAGNOSIS — M79.674 PAIN IN TOES OF BOTH FEET: Primary | ICD-10-CM

## 2024-11-25 DIAGNOSIS — M79.675 PAIN IN TOES OF BOTH FEET: Primary | ICD-10-CM

## 2024-11-25 NOTE — PROGRESS NOTES
[facility]    Vascular Surgery Consultation    Beatriz Rosario Patient Status:  Specimen    1950 MRN [id number]     Attending No att. providers found     PCP Minh Morrison MD     Date of Consult: 2024    “I was requested by Dr. Morrison to provide a consultation for Beatriz Rosario  Reason for Consultation:   Bilateral toe pain, concerns for PAD    History of Present Illness:   Patient is a 74 year old female with PMHx of hypertension, diabetes and hyperlipidemia who attended to the clinic today for bilateral toe pains.  Patient explains that she started to have bilateral toe pain since 2 months ago.  She explains that the pain is worse in the morning and subsides as the day goes by.  She denies having any form of claudication or rest pain.  She has not had any wound on bilateral lower extremity with delayed healing.  She is a non-smoker.  She underwent arterial duplex ultrasound of the bilateral lower extremity which was notable for mild atherosclerotic disease of bilateral lower extremity arteries.  She is here for further evaluation and recommendations regarding the above mentioned duplex ultrasound    Past Medical History  Past Medical History:    Allergic rhinitis    Cataracts, bilateral    Diabetes (HCC)    Diabetes mellitus, type II (HCC)    Ductal carcinoma in situ of right breast    Endometrial thickening on ultra sound    Glaucoma, right eye    Lumbar arthropathy    Osteoarthritis    Other and unspecified hyperlipidemia    Positive PPD    S/P INH prophylaxis for 6 months     Unspecified essential hypertension    Vitamin B12 deficiency anemia    Vitamin D deficiency       Past Surgical History  Past Surgical History:   Procedure Laterality Date    Biopsy of breast, needle core Bilateral     Breast biopsy Right     [facility]    Colonoscopy      Colonoscopy  2021    Colonoscopy N/A 2021    Procedure: COLONOSCOPY,;   Surgeon: Dirk Robbins MD;  Location: [facility], [facility]    Hysterectomy  2016    Laparoscopy,vag hysterectomy  09/26/2016    w/ BSO, benign    Lumpectomy right  08/06/2010    Needle biopsy left Left 08/12/2011    Radiation right  2011    breast       Family History  Family History   Problem Relation Age of Onset    Stroke Father 84    Hypertension Father 82        myocardial infarction    Heart Disease Father 82        CAD    Heart Disorder Father     Heart Disease Mother 77        CAD    Hypertension Mother         mycardial infarction    Musculo-skelatal Disorder Mother         osteoporosis    Heart Disorder Mother     Breast Cancer Sister 50        chemo; doing fine    Cancer Sister         breast cancer    Breast Cancer Maternal Aunt         post    Breast Cancer Self 59    DCIS Self 60       Social History  Social History     Socioeconomic History    Marital status:     Number of children: 0   Occupational History    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     Employer: [employer]   Tobacco Use    Smoking status: Never     Passive exposure: Never    Smokeless tobacco: Never   Vaping Use    Vaping status: Never Used   Substance and Sexual Activity    Alcohol use: No     Alcohol/week: 0.0 standard drinks of alcohol    Drug use: No   Other Topics Concern    Caffeine Concern Yes     Comment: 2 cups of coffee per day      Social Drivers of Health      Received from [employer]    [facility]        Current Medications:  No current facility-administered medications for this visit.     (Not in a hospital admission)      Allergies  Allergies[1]    Review of Systems:   Constitutional: No fevers, chills, fatigue or night sweats.  ENT: No neck pain, running nose, headaches.  Skin: No rashes, pruritus or skin changes,  Respiratory: No coughing, phlegm or wheezing.  CV: Denies chest pain, palpitations, orthopnea, or dizziness.  Musculoskeletal: Bilateral toe pain,  worse in the morning.  Denies having any pain in her hand joints.  GI: No nausea, vomiting.  No diarrhea.  Denies any abdominal pain or unintentional weight loss.  No food fear.  No postprandial abdominal pain.   Neurologic: No seizures, tremors, weakness or numbness.    Physical Exam:   Resp. rate 20, height 5' 3\" (1.6 m), weight 149 lb (67.6 kg).      General: NAD  Neck: No JVD  Lungs: CTA bilat  Heart: RRR  Abdomen: Soft, no tenderness or sign of peritonitis.  No rigidity or self guarding.    Extremities: Warm, dry, no LE edema bilat  Vascular: 2+ bilat DP and PT Pulses. B Femoral pulses  Skin: no rashes or legions noted  Neurological:  AAOx3, MAEW      Results:     Laboratory Data:  Lab Results   Component Value Date    WBC 7.4 06/06/2024    HGB 12.5 06/06/2024    HCT 38.2 06/06/2024    .0 06/06/2024    CREATSERUM 0.67 06/06/2024    BUN 13 06/06/2024     06/06/2024    K 3.8 06/06/2024     06/06/2024    CO2 28.0 06/06/2024     (H) 06/06/2024    CA 9.4 06/06/2024    ALB 4.6 06/06/2024    ALKPHO 73 06/06/2024    TP 6.7 06/06/2024    AST 25 06/06/2024    ALT 25 06/06/2024    INR 1.0 09/14/2016    TSH 0.655 10/10/2024     03/07/2019    DDIMER 0.53 05/25/2024    CRP <0.40 10/10/2024    MG 2.2 10/10/2024    TROPHS <3 05/25/2024     (H) 10/10/2024    B12 555 10/10/2024         Imaging:  US ARTERIAL DUPLEX LOWER EXTREMITY BILATERAL (CPT=93925)  Narrative: PROCEDURE: US ARTERIAL DUPLEX LOWER EXTREMITY BILATERAL (CPT=93925)     INDICATIONS: PAD; Atherosclerosis; Bilateral leg and foot pain     COMPARISON: None.     TECHNIQUE: Grayscale, color, and pulsed arterial Duplex of the bilateral lower extremities was performed.     FINDINGS:      RIGHT     Peak systolic velocities (cm/s) and waveforms:     External iliac artery:   109.3, multiphasic  Common femoral artery:  118.7, multiphasic     Deep femoral artery:   70, multiphasic  Proximal superficial femoral artery: 90, multiphasic  Mid  superficial femoral artery:  82, multiphasic  Distal superficial femoral artery:  77, multiphasic     Proximal popliteal artery:  62.9, multiphasic  Distal popliteal artery:    92.2, multiphasic     Anterior tibial artery:   127.5, multiphasic  Posterior tibial artery:   103.8, multiphasic  Peroneal artery:    63.5, multiphasic  Dorsalis pedis artery:   114.2, multiphasic     Grayscale images:  Minimal scattered atherosclerotic disease throughout all vasculature most notable in the in the mid SFA and popliteal artery     LEFT     Peak systolic velocities (cm/s) and waveforms:     External iliac artery:   108.1, multiphasic  Common femoral artery:  131, multiphasic     Deep femoral artery:   57.5, multiphasic  Proximal superficial femoral artery: 96.4, multiphasic  Mid superficial femoral artery:  106. 8,  multiphasic  Distal superficial femoral artery:  84.8, multiphasic     Proximal popliteal artery:  69.9, multiphasic  Distal popliteal artery:    81, multiphasic     Anterior tibial artery:   104.5, multiphasic  Posterior tibial artery:   103.4, multiphasic  Peroneal artery:    60.7, multiphasic  Dorsalis pedis artery:   102.1, monophasic     Grayscale images:  Mild atherosclerotic disease most notably in the proximal SFA.              Impression: CONCLUSION: 1. Minimal atherosclerotic disease with multiphasic waveforms in both lower extremities with three-vessel runoff.  No evidence of significant arterial occlusive disease.           Dictated by (CST): Amrik Guillen MD on 11/19/2024 at 11:54 AM       Finalized by (CST): Amrik Guillen MD on 11/19/2024 at 1:55 PM                 Impression:   74 year old female with PMHx of HTN, DM and HLP who attended to the clinic today for bilateral toe pain for 2 months.  She denies having any form of claudication or rest pain.  She has not had any wound on bilateral lower extremity with delayed healing. She is a non-smoker.  BLE arterial duplex US which is notable for mild  atherosclerotic disease of B proximal SFA, however the velocities are WNL      Recommendations:  - No surgical intervention is indicated at this time. The arterial duplex US findings are expected for patient's age.  She has palpable bilateral PT and DP pulses.  I am confident that patient's toe pain is not related to blood perfusion to her bilateral lower extremities.  - I recommend patient being worked up for other etiologies for bilateral toe pain such as musculoskeletal pain or rheumatologic pain.  - She is on 81 mg of aspirin and 40 mg of atorvastatin which she is the most ideal medications to prevent worsening atherosclerotic disease.  I recommend continuation of those medications indefinitely.  - Please call with any questions or concerns.      Thank you for allowing me to participate in the care of your patient.    Pearl Maria MD  11/25/2024  [facility], Division of Vascular Surgery    Please note: Dragon speech recognition software was used to prepare this note. If a word or phrase is confusing, it is likely do to a failure of recognition.   Please contact me with any questions or clarifications.       [1]   Allergies  Allergen Reactions    Latex

## 2024-11-27 ENCOUNTER — OFFICE VISIT (OUTPATIENT)
Age: 74
End: 2024-11-27
Payer: MEDICARE

## 2024-11-27 VITALS
WEIGHT: 147 LBS | TEMPERATURE: 98 F | BODY MASS INDEX: 26.05 KG/M2 | OXYGEN SATURATION: 96 % | HEIGHT: 63 IN | RESPIRATION RATE: 16 BRPM | SYSTOLIC BLOOD PRESSURE: 122 MMHG | DIASTOLIC BLOOD PRESSURE: 67 MMHG

## 2024-11-27 DIAGNOSIS — R92.30 DENSE BREAST TISSUE: ICD-10-CM

## 2024-11-27 DIAGNOSIS — Z86.000 HISTORY OF DUCTAL CARCINOMA IN SITU (DCIS) OF RIGHT BREAST: Primary | ICD-10-CM

## 2024-11-27 PROCEDURE — 1125F AMNT PAIN NOTED PAIN PRSNT: CPT | Performed by: SURGERY

## 2024-11-27 PROCEDURE — 99205 OFFICE O/P NEW HI 60 MIN: CPT | Performed by: SURGERY

## 2024-11-27 PROCEDURE — 3078F DIAST BP <80 MM HG: CPT | Performed by: SURGERY

## 2024-11-27 PROCEDURE — 1160F RVW MEDS BY RX/DR IN RCRD: CPT | Performed by: SURGERY

## 2024-11-27 PROCEDURE — 1159F MED LIST DOCD IN RCRD: CPT | Performed by: SURGERY

## 2024-11-27 PROCEDURE — 3074F SYST BP LT 130 MM HG: CPT | Performed by: SURGERY

## 2024-11-27 PROCEDURE — 3008F BODY MASS INDEX DOCD: CPT | Performed by: SURGERY

## 2024-11-27 NOTE — PROGRESS NOTES
Breast Surgery New Patient Consultation    This is the first visit for this 74 year old woman, referred by Dr. Morrison, who presents for evaluation of history of right breast DCIS.    History of Present Illness:   Ms. Beatriz Rosario is a 74 year old woman who presents with remote history of right breast DCIS that she said was treated about 20 years ago.  She had a lumpectomy followed by radiation and 5 years of tamoxifen at that time.  She said that over the past few months she has had increased pain and pressure in the right breast.  She had a bilateral diagnostic evaluation in June 2024 at which time she was evaluated for a concern in the left breast with no imaging correlate.  She no longer is following with any additional oncology providers.  She denies any nipple discharge or skin change.  She also has a remote history of a benign left breast needle biopsy and a family history of breast cancer in her sister.  She does not have any known genetic mutation in the family and did not previously undergo genetic testing. She is here today for evaluation and recommendations for further therapy.        Past Medical History:    Allergic rhinitis    Cataracts, bilateral    Diabetes (HCC)    Diabetes mellitus, type II (HCC)    Ductal carcinoma in situ of right breast    Endometrial thickening on ultra sound    Glaucoma, right eye    Lumbar arthropathy    Osteoarthritis    Other and unspecified hyperlipidemia    Positive PPD    S/P INH prophylaxis for 6 months     Unspecified essential hypertension    Vitamin B12 deficiency anemia    Vitamin D deficiency       Past Surgical History:   Procedure Laterality Date    Biopsy of breast, needle core Bilateral 2011    Breast biopsy Right 2001    [facility]    Colonoscopy  2008    Colonoscopy  06/2021    Colonoscopy N/A 06/25/2021    Procedure: COLONOSCOPY,;  Surgeon: Dirk Robbins MD;  [facility] [place]    Hysterectomy  2016    Laparoscopy,vag  hysterectomy  2016    w/ BSO, benign    Lumpectomy right  2010    Needle biopsy left Left 2011    Radiation right      breast       Gynecological History:  Pt is a   She achieved menarche at age: 14 and LMP 50s  Age of Menopause: 50s  Type: natural menopause  She denies any history of hormone replacement therapy   She denies any history of oral contraceptive use   She denies infertility treatment to achieve pregnancy.    Medications:    No outpatient medications have been marked as taking for the 24 encounter (Appointment) with Licha Lind MD.       Allergies:    Allergies[1]    Family History:   Family History   Problem Relation Age of Onset    Stroke Father 84    Hypertension Father 82        myocardial infarction    Heart Disease Father 82        CAD    Heart Disorder Father     Heart Disease Mother 77        CAD    Hypertension Mother         mycardial infarction    Musculo-skelatal Disorder Mother         osteoporosis    Heart Disorder Mother     Breast Cancer Sister 50        chemo; doing fine    Cancer Sister         breast cancer    Breast Cancer Maternal Aunt         post    Breast Cancer Self 59    DCIS Self 59       She is not of Ashkenazi [employer] ancestry.    Social History:  History   Alcohol Use No       History   Smoking Status    Never   Smokeless Tobacco    Never   Ms. Beatriz Rosario is  with 0 children. She has 9 siblings. She is currently Employed part-time      Review of Systems:  General:   The patient denies, fever, chills, night sweats, fatigue, generalized weakness, change in appetite or weight loss.    HEENT:     The patient denies eye irritation, cataracts, redness, glaucoma, yellowing of the eyes, change in vision, color blindness, or wearing contacts/glasses. The patient denies hearing loss, ringing in the ears, ear drainage, earaches, nasal congestion, nose bleeds, +snoring, pain in mouth/throat, hoarseness, change in voice, facial  trauma.    Respiratory:  The patient denies +chronic cough, phlegm, hemoptysis, pleurisy/chest pain, pneumonia, asthma, wheezing, difficulty in breathing with exertion, emphysema, chronic bronchitis, shortness of breath or abnormal sound when breathing.     Cardiovascular:  There is no history of chest pain, chest pressure/discomfort, palpitations, irregular heartbeat, fainting or near-fainting, difficulty breathing when lying flat, SOB/Coughing at night, swelling of the legs or chest pain while walking.    Breasts:  See history of present illness    Gastrointestinal:     There is no history of difficulty or pain with swallowing, +reflux symptoms, vomiting, dark or bloody stools, constipation, yellowing of the skin, indigestion, nausea, change in bowel habits, diarrhea, abdominal pain or vomiting blood.     Genitourinary:  The patient denies frequent urination, needing to get up at night to urinate, urinary hesitancy or retaining urine, painful urination, urinary incontinence, decreased urine stream, blood in the urine or vaginal/penile discharge.    Skin:    The patient denies rash, itching, skin lesions, dry skin, change in skin color or change in moles.     Hematologic/Lymphatic:  The patient denies easily bruising or bleeding or persistent swollen glands or lymph nodes.     Musculoskeletal:  The patient denies muscle aches/pain, +joint pain, +stiff joints, neck pain, back pain or bone pain.    Neuropsychiatric:  There is no history of migraines or severe headaches, seizure/epilepsy, speech problems, coordination problems, trembling/tremors, fainting/black outs, dizziness, memory problems, loss of sensation/numbness, problems walking, weakness, tingling or burning in hands/feet. There is no history of abusive relationship, bipolar disorder, sleep disturbance, anxiety, depression or feeling of despair.    Endocrine:    There is no history of poor/slow wound healing, weight loss/gain, fertility or hormone problems,  cold intolerance, thyroid disease.     Allergic/Immunologic:  There is no history of hives, hay fever, angioedema or anaphylaxis.    /67 (BP Location: Right arm, Patient Position: Sitting, Cuff Size: adult)   Temp 98.2 °F (36.8 °C) (Temporal)   Resp 16   Ht 1.6 m (5' 3\")   Wt 66.7 kg (147 lb)   SpO2 96%   BMI 26.04 kg/m²     Physical Exam:  The patient is an alert, oriented, well-nourished and  well-developed woman who appears her stated age. Her speech patterns and movements are normal. Her affect is appropriate.    HEENT: The head is normocephalic. The neck is supple. The thyroid is not enlarged and is without palpable masses/nodules. There are no palpable masses. The trachea is in the midline. Conjunctiva are clear, non-icteric.    Chest: The chest expands symmetrically. The lungs are clear to auscultation.    Heart: The rhythm is regular.  There are no murmurs, rubs, gallops or thrills.    Breasts:  Her breasts are symmetrical with bra size 40B  Right breast: The skin, nipple ,and areola appear normal. There is no skin dimpling with movement of the pectoralis. There is no nipple retraction. No nipple discharge can be elicited. The parenchyma is mildly nodular. There are no dominant masses in the breast. The axillary tail is normal.  There is a well-healed incision on the inframammary fold with radiation related changes and no palpable concern.  Left breast:   The skin, nipple, and areola appear normal. There is no skin dimpling with movement of the pectoralis. There is no nipple retraction. No nipple discharge can be elicited. The parenchyma is mildly nodular. There are no dominant masses in the breast. The axillary tail is normal.    Abdomen:  The abdomen is soft, flat and non tender. The liver is not enlarged. There are no palpable masses.    Lymph Nodes:  The supraclavicular, axillary and cervical regions are free of significant lymphadenopathy.    Back: There is no vertebral column  tenderness.    Skin: The skin appears normal. There are no suspicious appearing rashes or lesions.    Extremities: The extremities are without deformity, cyanosis or edema.    Impression:   Ms. Beatriz Rosario is a 74 year old woman presents with personal history of right breast DCIS with recent onset right breast pain.    Discussion and Plan:  I had a discussion with the Patient regarding her breast exam. On exam today I found evidence of prior surgery and radiation of the right breast with no obvious clinical findings.  Given the new symptomatology in the right breast in the context of her dense breast tissue and history of personal DCIS I am recommending an MRI for a more thorough evaluation.  Her next mammogram will be due in June 2025.  I will contact her with the MRI results when they are available for further recommendation.  She appropriately completed her radiation and tamoxifen therapy for her prior diagnosis.  She was counseled to meet with our genetic counselor remotely given her personal and family history of breast cancer and declined. She was given ample opportunity for questions and those questions were answered to her satisfaction. She has been  encouraged to contact the office with any questions or concerns prior to her next appointment.     This note was created by [employer] voice recognition. Errors in content may be related to improper recognition by the system; efforts to review and correct have been done but errors may still exist. Please be advised the primary purpose of this note is for me to communicate medical care. Standard sentence structure is not always used. Medical terminology and medical abbreviations may be used. There may be grammatical, typographical, and automated fill ins that may have errors missed in proofreading.           [1]   Allergies  Allergen Reactions    Latex

## 2024-12-03 NOTE — TELEPHONE ENCOUNTER
Pharmacy requesting refill     losartan 50 MG Oral Tab Take 1 tablet (50 mg total) by mouth 2 (two) times daily. 180 tablet 3

## 2024-12-05 RX ORDER — LOSARTAN POTASSIUM 50 MG/1
50 TABLET ORAL 2 TIMES DAILY
Qty: 180 TABLET | Refills: 3 | Status: SHIPPED | OUTPATIENT
Start: 2024-12-05

## 2024-12-06 NOTE — TELEPHONE ENCOUNTER
Refill passed per [facility] protocol.     Requested Prescriptions   Pending Prescriptions Disp Refills    losartan 50 MG Oral Tab 180 tablet 3     Sig: Take 1 tablet (50 mg total) by mouth 2 (two) times daily.       Hypertension Medications Protocol Passed - 12/5/2024  8:40 PM        Passed - CMP or BMP in past 12 months        Passed - Last BP reading less than 140/90     BP Readings from Last 1 Encounters:   11/27/24 122/67               Passed - In person appointment or virtual visit in the past 12 mos or appointment in next 3 mos     Recent Outpatient Visits              1 week ago History of ductal carcinoma in situ (DCIS) of right breast    [facility] [place] Licha Lind MD    Office Visit    1 week ago Pain in toes of both feet    [facility] [place]    Office Visit    3 weeks ago PAD (peripheral artery disease) (HCC)    [facility] Zack Stewart MD    Office Visit    1 month ago Bilateral leg and foot pain    [facility], Zack Stewart MD    Office Visit    2 months ago Epigastric pain    [facility], Zack Stewart MD    Office Visit          Future Appointments         Provider Department Appt Notes    In 1 month ADO KEARA RM1; ADO DEXA RM1 [facility] DEXA - [place]                     Passed - EGFRCR or GFRNAA > 50     GFR Evaluation  EGFRCR: 92 , resulted on 6/6/2024

## 2025-01-06 ENCOUNTER — OFFICE VISIT (OUTPATIENT)
Dept: INTERNAL MEDICINE CLINIC | Facility: CLINIC | Age: 75
End: 2025-01-06

## 2025-01-06 VITALS — SYSTOLIC BLOOD PRESSURE: 112 MMHG | HEART RATE: 88 BPM | DIASTOLIC BLOOD PRESSURE: 63 MMHG

## 2025-01-06 DIAGNOSIS — K21.00 GASTROESOPHAGEAL REFLUX DISEASE WITH ESOPHAGITIS WITHOUT HEMORRHAGE: ICD-10-CM

## 2025-01-06 DIAGNOSIS — E11.59 HYPERTENSION ASSOCIATED WITH TYPE 2 DIABETES MELLITUS (HCC): ICD-10-CM

## 2025-01-06 DIAGNOSIS — M17.0 OSTEOARTHRITIS OF BOTH KNEES, UNSPECIFIED OSTEOARTHRITIS TYPE: ICD-10-CM

## 2025-01-06 DIAGNOSIS — K86.2 PANCREATIC CYST (HCC): ICD-10-CM

## 2025-01-06 DIAGNOSIS — E11.9 TYPE 2 DIABETES MELLITUS WITHOUT COMPLICATION, WITHOUT LONG-TERM CURRENT USE OF INSULIN (HCC): ICD-10-CM

## 2025-01-06 DIAGNOSIS — E11.69 HYPERLIPIDEMIA ASSOCIATED WITH TYPE 2 DIABETES MELLITUS (HCC): ICD-10-CM

## 2025-01-06 DIAGNOSIS — Z86.000 HISTORY OF DUCTAL CARCINOMA IN SITU (DCIS) OF BREAST: ICD-10-CM

## 2025-01-06 DIAGNOSIS — R05.3 CHRONIC COUGH: ICD-10-CM

## 2025-01-06 DIAGNOSIS — M81.0 AGE-RELATED OSTEOPOROSIS WITHOUT CURRENT PATHOLOGICAL FRACTURE: ICD-10-CM

## 2025-01-06 DIAGNOSIS — I15.2 HYPERTENSION ASSOCIATED WITH TYPE 2 DIABETES MELLITUS (HCC): ICD-10-CM

## 2025-01-06 DIAGNOSIS — Z13.6 ENCOUNTER FOR SCREENING FOR CORONARY ARTERY DISEASE: ICD-10-CM

## 2025-01-06 DIAGNOSIS — D50.9 IRON DEFICIENCY ANEMIA, UNSPECIFIED IRON DEFICIENCY ANEMIA TYPE: ICD-10-CM

## 2025-01-06 DIAGNOSIS — E78.5 HYPERLIPIDEMIA ASSOCIATED WITH TYPE 2 DIABETES MELLITUS (HCC): ICD-10-CM

## 2025-01-06 DIAGNOSIS — I70.0 ATHEROSCLEROSIS OF AORTA (HCC): ICD-10-CM

## 2025-01-06 DIAGNOSIS — Z86.000 HISTORY OF DUCTAL CARCINOMA IN SITU (DCIS) OF RIGHT BREAST: ICD-10-CM

## 2025-01-06 DIAGNOSIS — Z00.00 MEDICARE ANNUAL WELLNESS VISIT, SUBSEQUENT: Primary | ICD-10-CM

## 2025-01-06 DIAGNOSIS — E55.9 VITAMIN D DEFICIENCY: ICD-10-CM

## 2025-01-06 DIAGNOSIS — E03.9 HYPOTHYROIDISM, UNSPECIFIED TYPE: ICD-10-CM

## 2025-01-06 DIAGNOSIS — I73.9 PAD (PERIPHERAL ARTERY DISEASE) (HCC): ICD-10-CM

## 2025-01-06 DIAGNOSIS — H40.9 GLAUCOMA, UNSPECIFIED GLAUCOMA TYPE, UNSPECIFIED LATERALITY: ICD-10-CM

## 2025-01-06 PROBLEM — R07.89 INTERMITTENT LEFT-SIDED CHEST PAIN: Status: RESOLVED | Noted: 2024-06-10 | Resolved: 2025-01-06

## 2025-01-06 PROBLEM — Z01.00 DIABETIC EYE EXAM (HCC): Status: RESOLVED | Noted: 2024-06-10 | Resolved: 2025-01-06

## 2025-01-06 RX ORDER — METHYLPREDNISOLONE 4 MG/1
TABLET ORAL
Qty: 1 EACH | Refills: 0 | Status: SHIPPED | OUTPATIENT
Start: 2025-01-06

## 2025-01-06 RX ORDER — ALBUTEROL SULFATE 90 UG/1
1 INHALANT RESPIRATORY (INHALATION)
Qty: 1 EACH | Refills: 11 | Status: SHIPPED | OUTPATIENT
Start: 2025-01-06

## 2025-01-06 RX ORDER — BENZONATATE 100 MG/1
100 CAPSULE ORAL 3 TIMES DAILY PRN
Qty: 30 CAPSULE | Refills: 0 | Status: SHIPPED | OUTPATIENT
Start: 2025-01-06 | End: 2025-02-05

## 2025-01-06 NOTE — PROGRESS NOTES
Subjective:   Beatriz Rosario is a 74 year old female with Pmhx of HTN, HLD, T2DM, Atherosclerosis of aorta, pancreatic cyst,osteoporosis (on vitamin D/Calcium, declined bisphosphates), vitamin d def, History of right breast DCIS (followed by Surgical oncology Dr. Lind) s/p lumpectomy and radation and tamoxifen. who presents for a Medicare Subsequent Annual Wellness visit (Pt already had Initial Annual Wellness) and scheduled follow up of multiple significant but stable problems and acute exacerbation of a chronic illness.   Detached garage 100feet,    Dry cough       Ophthalmology: Dr. Issac kinney (for glaucoma)     History/Other:   Fall Risk Assessment:   She has been screened for Falls and is low risk.      Cognitive Assessment:   She had a completely normal cognitive assessment - see flowsheet entries       Functional Ability/Status:   Beatriz Rosario has a completely normal functional assessment. See flowsheet for details.      Depression Screening (PHQ):  PHQ-2 SCORE: 0  , done 1/6/2025   If you checked off any problems, how difficult have these problems made it for you to do your work, take care of things at home, or get along with other people?: Not difficult at all    Last [facility] Suicide Screening on 1/6/2025 was No Risk.     <5 minutes spent screening and counseling for depression    Advanced Directives:   She does have a Living Will but we do NOT have it on file in Epic.    She does NOT have a Power of  for Health Care. [Do you have a healthcare power of ?: No]  Discussed Advance Care Planning with patient (and family/surrogate if present). Standard forms made available to patient in After Visit Summary.      Patient Active Problem List   Diagnosis    Allergic rhinitis    Hypertension associated with type 2 diabetes mellitus (HCC)    Type 2 diabetes mellitus without complication, without long-term current use of insulin (HCC)    Vitamin D deficiency    Glaucoma    Atherosclerosis of  aorta (HCC)    Colon cancer screening    Hyperlipidemia associated with type 2 diabetes mellitus (HCC)    Age-related osteoporosis without current pathological fracture    Pancreatic cyst (HCC)    Medicare annual wellness visit, subsequent    History of ductal carcinoma in situ (DCIS) of right breast    PAD (peripheral artery disease) (HCC)     Allergies:  She is allergic to latex.    Current Medications:  Outpatient Medications Marked as Taking for the 1/6/25 encounter (Office Visit) with Zack Ellison MD   Medication Sig    methylPREDNISolone 4 MG Oral Tablet Therapy Pack Take as directed with food.    benzonatate 100 MG Oral Cap Take 1 capsule (100 mg total) by mouth 3 (three) times daily as needed.    albuterol 108 (90 Base) MCG/ACT Inhalation Aero Soln Inhale 1 puff into the lungs every 4 to 6 hours as needed. FOR ASTHMA. Pharmacist, please switch to formulary alternative per insurance coverage, ok to replace with proair, ventolin, proventil, or any other albuterol inhaler. -Dr. Ellison    losartan 50 MG Oral Tab Take 1 tablet (50 mg total) by mouth 2 (two) times daily.    Omeprazole 40 MG Oral Capsule Delayed Release Take 1 capsule (40 mg total) by mouth daily.    gabapentin 100 MG Oral Cap Take 1 capsule (100 mg total) by mouth 3 (three) times daily as needed.    amLODIPine 2.5 MG Oral Tab TAKE 1 TABLET BY MOUTH IN THE AM AND 1 TABLET BEFORE BEDTIME.    atorvastatin 40 MG Oral Tab Take 1 tablet (40 mg total) by mouth nightly.    hydroCHLOROthiazide 12.5 MG Oral Tab Take 1 tablet (12.5 mg total) by mouth 2 (two) times daily. (Patient taking differently: Take 1 tablet (12.5 mg total) by mouth as needed (as needed for feet swelling).)    montelukast 10 MG Oral Tab Take 1 tablet (10 mg total) by mouth nightly. (Patient taking differently: Take 1 tablet (10 mg total) by mouth once as needed.)    Calcium Carbonate-Vitamin D 600-200 MG-UNIT Oral Cap Take  by mouth. 1 po q12hrs.    aspirin 81 MG Oral Tab EC Take  by  mouth. take 1 tablet (81MG)  by ORAL route  every day    Multiple Vitamin (MULTI-DAY) Oral Tab Take  by mouth.       Medical History:  She  has a past medical history of Allergic rhinitis (3/15/2011), Cataracts, bilateral, Diabetes (HCC), Diabetes mellitus, type II (HCC) (2012), Ductal carcinoma in situ of right breast (2010), Endometrial thickening on ultra sound (8/11/2014), Glaucoma, right eye, Lumbar arthropathy (6/12/2018), Osteoarthritis, Other and unspecified hyperlipidemia, Positive PPD (2005), Unspecified essential hypertension, Vitamin B12 deficiency anemia (1/3/2008), and Vitamin D deficiency (10/31/2011).  Surgical History:  She  has a past surgical history that includes lumpectomy right (08/06/2010); colonoscopy (2008); biopsy of breast, needle core (Bilateral, 2011); laparoscopy,vag hysterectomy (09/26/2016); radiation right (2011); Breast biopsy (Right, 2001); needle biopsy left (Left, 08/12/2011); hysterectomy (2016); colonoscopy (06/2021); and colonoscopy (N/A, 06/25/2021).   Family History:  Her family history includes Breast Cancer in her maternal aunt; Breast Cancer (age of onset: 50) in her sister; Breast Cancer (age of onset: 59) in her self; Cancer in her sister; DCIS (age of onset: 60) in her self; Heart Disease (age of onset: 77) in her mother; Heart Disease (age of onset: 82) in her father; Heart Disorder in her father and mother; Hypertension in her mother; Hypertension (age of onset: 82) in her father; Musculo-skelatal Disorder in her mother; Stroke (age of onset: 84) in her father.  Social History:  She  reports that she has never smoked. She has never been exposed to tobacco smoke. She has never used smokeless tobacco. She reports that she does not drink alcohol and does not use drugs.    Tobacco:  She has never smoked tobacco.    CAGE Alcohol Screen:   CAGE screening score of 0 on 1/6/2025, showing low risk of alcohol abuse.      Patient Care Team:  Minh Morrison MD as PCP -  General (Internal Medicine)  Sara De Guzman MD (Internal Medicine)  Zack Ellison MD (Internal Medicine)    Review of Systems       Objective:   Physical Exam       /63 (BP Location: Left arm, Patient Position: Sitting, Cuff Size: adult)   Pulse 88  Estimated body mass index is 26.04 kg/m² as calculated from the following:    Height as of 11/27/24: 5' 3\" (1.6 m).    Weight as of 11/27/24: 147 lb (66.7 kg).    Medicare Hearing Assessment:   Hearing Screening    Screening Method: Questionnaire  I have a problem hearing over the telephone: No I have trouble following the conversations when two or more people are talking at the same time: No   I have trouble understanding things on the TV: No I have to strain to understand conversations: No   I have to worry about missing the telephone ring or doorbell: No I have trouble hearing conversations in a noisy background such as a crowded room or restaurant: Sometimes   I get confused about where sounds come from: No I misunderstand some words in a sentence and need to ask people to repeat themselves: Sometimes   I especially have trouble understanding the speech of women and children: No I have trouble understanding the speaker in a large room such as at a meeting or place of Buddhism: No   Many people I talk to seem to mumble (or don't speak clearly): No People get annoyed because I misunderstand what they say: Sometimes   I misunderstand what others are saying and make inappropriate responses: No I avoid social activities because I cannot hear well and fear I will reply improperly: No   Family members and friends have told me they think I may have hearing loss: No             Visual Acuity:   Right Eye Visual Acuity: Uncorrected Right Eye Chart Acuity: 20/30   Left Eye Visual Acuity: Uncorrected Left Eye Chart Acuity: 20/30   Both Eyes Visual Acuity: Corrected Both Eyes Chart Acuity: 20/20   Able To Tolerate Visual Acuity: Yes        Assessment & Plan:   Beatriz  Natalia Rosario is a 74 year old female who presents for a Medicare Assessment.     1. Medicare annual wellness visit, subsequent (Primary)  -     CT CALCIUM SCORING; Future; Expected date: 01/06/2025  -     Lipid Panel; Future; Expected date: 01/06/2025  -     TSH W Reflex To Free T4; Future; Expected date: 01/06/2025  -     Hemoglobin A1C; Future; Expected date: 01/06/2025  -     Comp Metabolic Panel (14); Future; Expected date: 01/06/2025  -     CBC With Differential With Platelet; Future; Expected date: 01/06/2025  -     Vitamin D; Future; Expected date: 01/06/2025  Patient here for physical exam and due for following.  Plan:  -order the following Labs: CBC, CMP, Lipid Panel, A1C, TSH, Vitamin D,  PSA in males.   -Obtain Baseline EKG given family history of early CAD/Atherosclerosis  -Discussed benefit for Screening for Cardiac CT scan for evaluation of cardiac arthrosclerosis, ordered Calcium CT scan, should be repeated every 3 years. If abnormal will refer to cardiology.   -Order DEXA scan if over 65, and repeat every 2 years  -I recommend to eat a more balanced mediterranean diet (eat more vegetables and fruits) more omega 3 fatty acids, lean protein (chicken, turkey, seafood), less process/fried fatty foods, less red met, and mixed aerobic exercise 30 minutes a day and intermittent strength training.        2. Type 2 diabetes mellitus without complication, without long-term current use of insulin (HCC)  -     Microalb/Creat Ratio, Random Urine; Future; Expected date: 01/06/2025  Plan  Chronic, well controlled on lifestyle changesdenies adverse effects, continue with present management.      3. Hyperlipidemia associated with type 2 diabetes mellitus (HCC)  -     Lipid Panel; Future; Expected date: 01/06/2025  Plan  Chronic, well controlled on current medications, denies adverse effects, continue with present management.      4. Hypertension associated with type 2 diabetes mellitus (HCC)  -     Comp Metabolic Panel  (14); Future; Expected date: 01/06/2025  -     Microalb/Creat Ratio, Random Urine; Future; Expected date: 01/06/2025  Plan  Chronic, well controlled on current medications, denies adverse effects, continue with present management.    5. History of ductal carcinoma in situ (DCIS) of breast  Followed by Breast Onc, upcoming breast MRI    6. Age-related osteoporosis without current pathological fracture  -     Endocrine Referral - In Network  Pt with history of Osteoporosis, has upcoming DEXA scan. Has declined Bisphosphonate due to concern of osteonecrosis of the Jaw,  Plan  -upcoming DEXA on 1/9  -continue Calcium/Vitamin D  -follow up with Endocrine for consideration of Prolia given concern for side effects with Bisphosphonates    7. Iron deficiency anemia, unspecified iron deficiency anemia type  -     Iron And Tibc; Future; Expected date: 01/06/2025  -     Ferritin; Future; Expected date: 01/06/2025  Iron def anemia,  Plan  -check iron panel, and ferrous sulfate if low  8. Hypothyroidism, unspecified type.  Check TSH and dose synthroid accordingly    9. Gastroesophageal reflux disease with esophagitis without hemorrhagePlan  Chronic, well controlled on current medications, denies adverse effects, continue with present management.    10. PAD (peripheral artery disease) (HCC)  Plan  Chronic, well controlled on current medications, denies adverse effects, continue with present management.    11. History of ductal carcinoma in situ (DCIS) of right breast  Followed by Breast Onc, upcoming breast MRI    12. Glaucoma, unspecified glaucoma type, unspecified laterality  On Latanoprost, followed by Ophthalmology dr. Mock    13. Pancreatic cyst (HCC)  Stable    14. Atherosclerosis of aorta (HCC)  Overview:  Ct abd/pelvis 8/2015  On statin, stable    15. Vitamin D deficiency  -     Vitamin D; Future; Expected date: 01/06/2025  Pt with Vitamin D def due for screening:  Plan;  -Check vitamin D level, depending on level will  give guidance on what dose to recommend per guidelines.      16. Encounter for screening for coronary artery disease  -     CT CALCIUM SCORING; Future; Expected date: 01/06/2025  -Discussed benefit for Screening for Cardiac CT scan for evaluation of cardiac arthrosclerosis, ordered Calcium CT scan, should be repeated every 3 years. If abnormal will refer to cardiology.      17. Osteoarthritis of both knees, unspecified osteoarthritis type  Pt with Bilateral Knee OA, difficult to walk long distances  Plan  -OA of bilateral knees   18. Chronic cough  -     XR CHEST PA + LAT CHEST (CPT=71046); Future; Expected date: 01/06/2025  -     Pulmonary Function Test; Future; Expected date: 01/06/2025  -     methylPREDNISolone; Take as directed with food.  Dispense: 1 each; Refill: 0  -     Benzonatate; Take 1 capsule (100 mg total) by mouth 3 (three) times daily as needed.  Dispense: 30 capsule; Refill: 0  -     Pulmonary Referral - In Network  -     Albuterol Sulfate HFA; Inhale 1 puff into the lungs every 4 to 6 hours as needed. FOR ASTHMA. Pharmacist, please switch to formulary alternative per insurance coverage, ok to replace with proair, ventolin, proventil, or any other albuterol inhaler. -Dr. Ellison  Dispense: 1 each; Refill: 11  Pt with Chronic cough, had covid in 2023  Plan  -trial medrol dose pack and tessalon, and rescue inhaler  Check CXR and PFT  -if no improvement follow up with pulmonology  The patient indicates understanding of these issues and agrees to the plan.  Reinforced healthy diet, lifestyle, and exercise.      Return in about 3 months (around 4/6/2025), or if symptoms worsen or fail to improve.     Zack Ellison MD, 1/6/2025     Supplementary Documentation:   General Health:  In the past six months, have you lost more than 10 pounds without trying?: 2 - No  Has your appetite been poor?: No  Type of Diet: Low Salt  How does the patient maintain a good energy level?: Appropriate Exercise;Stretching  How  would you describe your daily physical activity?: Moderate  How would you describe your current health state?: Fair  How do you maintain positive mental well-being?: Social Interaction  On a scale of 0 to 10, with 0 being no pain and 10 being severe pain, what is your pain level?: 5 - (Moderate)  In the past six months, have you experienced urine leakage?: 0-No  At any time do you feel concerned for the safety/well-being of yourself and/or your children, in your home or elsewhere?: No  Have you had any immunizations at another office such as Influenza, Hepatitis B, Tetanus, or Pneumococcal?: No    Health Maintenance   Topic Date Due    Zoster Vaccines (2 of 3) 01/04/2014    Diabetes Care Dilated Eye Exam  08/06/2023    Annual Well Visit  01/01/2025    Annual Depression Screening  01/01/2025    Fall Risk Screening (Annual)  01/01/2025    Diabetes Care: Foot Exam (Annual)  01/01/2025    Diabetes Care: Microalb/Creat Ratio (Annual)  01/01/2025    Diabetes Care A1C  04/10/2025    Diabetes Care: GFR  06/06/2025    Mammogram  06/24/2025    Colorectal Cancer Screening  06/25/2031    Influenza Vaccine  Completed    DEXA Scan  Completed    Pneumococcal Vaccine: 65+ Years  Completed    COVID-19 Vaccine  Discontinued

## 2025-01-17 ENCOUNTER — HOSPITAL ENCOUNTER (OUTPATIENT)
Dept: GENERAL RADIOLOGY | Age: 75
Discharge: HOME | End: 2025-01-17
Attending: STUDENT IN AN ORGANIZED HEALTH CARE EDUCATION/TRAINING PROGRAM
Payer: MEDICARE

## 2025-01-17 DIAGNOSIS — R05.3 CHRONIC COUGH: ICD-10-CM

## 2025-01-17 PROCEDURE — 71046 X-RAY EXAM CHEST 2 VIEWS: CPT | Performed by: STUDENT IN AN ORGANIZED HEALTH CARE EDUCATION/TRAINING PROGRAM

## 2025-01-21 DIAGNOSIS — R05.3 CHRONIC COUGH: Primary | ICD-10-CM

## 2025-01-23 ENCOUNTER — TELEPHONE (OUTPATIENT)
Dept: INTERNAL MEDICINE CLINIC | Facility: CLINIC | Age: 75
End: 2025-01-23

## 2025-01-23 DIAGNOSIS — K86.2 PANCREATIC CYST (HCC): Primary | ICD-10-CM

## 2025-01-23 NOTE — TELEPHONE ENCOUNTER
Patient calling to state gastroenterologist they were referred to by Dr. Ellison does not do office visits, only sees patients in hospital, asking for referral to new gastroenterologist.

## 2025-01-24 NOTE — TELEPHONE ENCOUNTER
Patient was called and inform of new referral below. Patient was inform to call them asap to schedule the appointment. Patient verbalized understanding.        Referred to Provider Information:  Provider Address Phone   DESEAN Olvera MD [address] [address] [phone]

## 2025-01-25 ENCOUNTER — LAB ENCOUNTER (OUTPATIENT)
Dept: LAB | Age: 75
End: 2025-01-25
Attending: INTERNAL MEDICINE
Payer: MEDICARE

## 2025-01-25 DIAGNOSIS — Z00.00 MEDICARE ANNUAL WELLNESS VISIT, SUBSEQUENT: ICD-10-CM

## 2025-01-25 DIAGNOSIS — E55.9 VITAMIN D DEFICIENCY: ICD-10-CM

## 2025-01-25 DIAGNOSIS — E11.59 HYPERTENSION ASSOCIATED WITH TYPE 2 DIABETES MELLITUS (HCC): ICD-10-CM

## 2025-01-25 DIAGNOSIS — E11.69 HYPERLIPIDEMIA ASSOCIATED WITH TYPE 2 DIABETES MELLITUS (HCC): ICD-10-CM

## 2025-01-25 DIAGNOSIS — E78.5 HYPERLIPIDEMIA ASSOCIATED WITH TYPE 2 DIABETES MELLITUS (HCC): ICD-10-CM

## 2025-01-25 DIAGNOSIS — D50.9 IRON DEFICIENCY ANEMIA, UNSPECIFIED IRON DEFICIENCY ANEMIA TYPE: ICD-10-CM

## 2025-01-25 DIAGNOSIS — I15.2 HYPERTENSION ASSOCIATED WITH TYPE 2 DIABETES MELLITUS (HCC): ICD-10-CM

## 2025-01-25 DIAGNOSIS — E11.9 TYPE 2 DIABETES MELLITUS WITHOUT COMPLICATION, WITHOUT LONG-TERM CURRENT USE OF INSULIN (HCC): ICD-10-CM

## 2025-01-25 LAB
ALBUMIN SERPL-MCNC: 4.9 G/DL (ref 3.2–4.8)
ALBUMIN/GLOB SERPL: 2.3 {RATIO} (ref 1–2)
ALP LIVER SERPL-CCNC: 71 U/L
ALT SERPL-CCNC: 22 U/L
ANION GAP SERPL CALC-SCNC: 7 MMOL/L (ref 0–18)
AST SERPL-CCNC: 23 U/L (ref ?–34)
BASOPHILS # BLD AUTO: 0.07 X10(3) UL (ref 0–0.2)
BASOPHILS NFR BLD AUTO: 0.9 %
BILIRUB SERPL-MCNC: 0.6 MG/DL (ref 0.2–1.1)
BUN BLD-MCNC: 14 MG/DL (ref 9–23)
BUN/CREAT SERPL: 21.2 (ref 10–20)
CALCIUM BLD-MCNC: 10 MG/DL (ref 8.7–10.4)
CHLORIDE SERPL-SCNC: 103 MMOL/L (ref 98–112)
CHOLEST SERPL-MCNC: 133 MG/DL (ref ?–200)
CO2 SERPL-SCNC: 29 MMOL/L (ref 21–32)
CREAT BLD-MCNC: 0.66 MG/DL
CREAT UR-SCNC: 49 MG/DL
DEPRECATED HBV CORE AB SER IA-ACNC: 306 NG/ML
DEPRECATED RDW RBC AUTO: 49.1 FL (ref 35.1–46.3)
EGFRCR SERPLBLD CKD-EPI 2021: 93 ML/MIN/1.73M2 (ref 60–?)
EOSINOPHIL # BLD AUTO: 0.08 X10(3) UL (ref 0–0.7)
EOSINOPHIL NFR BLD AUTO: 1 %
ERYTHROCYTE [DISTWIDTH] IN BLOOD BY AUTOMATED COUNT: 14.5 % (ref 11–15)
EST. AVERAGE GLUCOSE BLD GHB EST-MCNC: 134 MG/DL (ref 68–126)
FASTING PATIENT LIPID ANSWER: YES
FASTING STATUS PATIENT QL REPORTED: YES
GLOBULIN PLAS-MCNC: 2.1 G/DL (ref 2–3.5)
GLUCOSE BLD-MCNC: 100 MG/DL (ref 70–99)
HBA1C MFR BLD: 6.3 % (ref ?–5.7)
HCT VFR BLD AUTO: 37 %
HDLC SERPL-MCNC: 55 MG/DL (ref 40–59)
HGB BLD-MCNC: 12.3 G/DL
IMM GRANULOCYTES # BLD AUTO: 0.01 X10(3) UL (ref 0–1)
IMM GRANULOCYTES NFR BLD: 0.1 %
IRON SATN MFR SERPL: 22 %
IRON SERPL-MCNC: 89 UG/DL
LDLC SERPL CALC-MCNC: 52 MG/DL (ref ?–100)
LYMPHOCYTES # BLD AUTO: 3.54 X10(3) UL (ref 1–4)
LYMPHOCYTES NFR BLD AUTO: 46.3 %
MCH RBC QN AUTO: 30.4 PG (ref 26–34)
MCHC RBC AUTO-ENTMCNC: 33.2 G/DL (ref 31–37)
MCV RBC AUTO: 91.6 FL
MICROALBUMIN UR-MCNC: <0.3 MG/DL
MONOCYTES # BLD AUTO: 0.52 X10(3) UL (ref 0.1–1)
MONOCYTES NFR BLD AUTO: 6.8 %
NEUTROPHILS # BLD AUTO: 3.42 X10 (3) UL (ref 1.5–7.7)
NEUTROPHILS # BLD AUTO: 3.42 X10(3) UL (ref 1.5–7.7)
NEUTROPHILS NFR BLD AUTO: 44.9 %
NONHDLC SERPL-MCNC: 78 MG/DL (ref ?–130)
OSMOLALITY SERPL CALC.SUM OF ELEC: 289 MOSM/KG (ref 275–295)
PLATELET # BLD AUTO: 300 10(3)UL (ref 150–450)
POTASSIUM SERPL-SCNC: 3.9 MMOL/L (ref 3.5–5.1)
PROT SERPL-MCNC: 7 G/DL (ref 5.7–8.2)
RBC # BLD AUTO: 4.04 X10(6)UL
SODIUM SERPL-SCNC: 139 MMOL/L (ref 136–145)
TIBC SERPL-MCNC: 407 UG/DL (ref 250–425)
TRANSFERRIN SERPL-MCNC: 273 MG/DL (ref 250–380)
TRIGL SERPL-MCNC: 156 MG/DL (ref 30–149)
TSI SER-ACNC: 0.99 UIU/ML (ref 0.55–4.78)
VIT D+METAB SERPL-MCNC: 24.6 NG/ML (ref 30–100)
VLDLC SERPL CALC-MCNC: 22 MG/DL (ref 0–30)
WBC # BLD AUTO: 7.6 X10(3) UL (ref 4–11)

## 2025-01-25 PROCEDURE — 83540 ASSAY OF IRON: CPT

## 2025-01-25 PROCEDURE — 84466 ASSAY OF TRANSFERRIN: CPT

## 2025-01-25 PROCEDURE — 80053 COMPREHEN METABOLIC PANEL: CPT

## 2025-01-25 PROCEDURE — 82728 ASSAY OF FERRITIN: CPT

## 2025-01-25 PROCEDURE — 80061 LIPID PANEL: CPT

## 2025-01-25 PROCEDURE — 84443 ASSAY THYROID STIM HORMONE: CPT

## 2025-01-25 PROCEDURE — 36415 COLL VENOUS BLD VENIPUNCTURE: CPT

## 2025-01-25 PROCEDURE — 83036 HEMOGLOBIN GLYCOSYLATED A1C: CPT

## 2025-01-25 PROCEDURE — 82306 VITAMIN D 25 HYDROXY: CPT

## 2025-01-25 PROCEDURE — 82570 ASSAY OF URINE CREATININE: CPT

## 2025-01-25 PROCEDURE — 85025 COMPLETE CBC W/AUTO DIFF WBC: CPT

## 2025-01-25 PROCEDURE — 82043 UR ALBUMIN QUANTITATIVE: CPT

## 2025-01-26 NOTE — PROGRESS NOTES
Please relay to pt if not read:    Hello There!     Dr. Ellison here, I have reviewed your labs here are your recommendations:    # Lipids/cholesterol: Your lips are stable, continue with current medications Atorvastatin 40mg please continue, and continue low fried fatty foods. I recommend to eat a more balanced mediterranean diet (eat more vegetables and fruits) more omega 3 fatty acids, lean protein (chicken, turkey, seafood), less process/fried fatty foods, less red met, and mixed aerobic exercise 30 minutes a day and intermittent strength training.      The 10-year ASCVD risk score (Diego LUCIO, et al., 2019) is: 22.8%    Values used to calculate the score:      Age: 74 years      Sex: Female      Is Non- : No      Diabetic: Yes      Tobacco smoker: No      Systolic Blood Pressure: 112 mmHg      Is BP treated: Yes      HDL Cholesterol: 55 mg/dL      Total Cholesterol: 133 mg/dL    # Diabetes/A1C: Your A1C Last A1c value was 6.3% done 1/25/2025. which shows  in Pre-diabetic range and well controlled with lifestyle changes.      # TSH: Your thyroid (TSH) function is normal.     # CMP: Your comprehensive metabolic panel shows overall stable functioning kidneys (creatinine, GFR), liver (AST, ALT, Bilirubin), and electrolytes (sodium, potassium, calcium). Slight variations in other values such as BUN/Creat, Serum Osm, anion gap, chloride, etc are not of clinical value at this time.     # CBC: Your complete blood count shows overall stable red blood cells, white blood cells, platelets (help you stop bleeding), and hematocrit (thickness of blood),  Slight variations in other values such as RDW/sw, MCH are not of clinical value at this time.     #Anemia screening: Your Iron panel is normal, if you are currently taking any Over the counter supplementation (Iron pill or Multivitamin) please continue on current dose       # Vitamins: Your vitamin D level is Vitamin D Insufficiency (<30ng/mL) please  start 2,000 International Units daily (it is cheaper to purchase from [employer] or your local pharmacy rather than sending a prescription in). will recheck with next annual physical or sooner if clinically indicated      If you have any questions or concerns in regards to these labs please schedule a follow up and will gladly discuss.   -Dr. Ellison

## 2025-01-30 ENCOUNTER — HOSPITAL ENCOUNTER (OUTPATIENT)
Dept: CT IMAGING | Facility: HOSPITAL | Age: 75
Discharge: HOME | End: 2025-01-30
Attending: STUDENT IN AN ORGANIZED HEALTH CARE EDUCATION/TRAINING PROGRAM
Payer: MEDICARE

## 2025-01-30 ENCOUNTER — HOSPITAL ENCOUNTER (OUTPATIENT)
Dept: BONE DENSITY | Facility: HOSPITAL | Age: 75
Discharge: HOME | End: 2025-01-30
Attending: INTERNAL MEDICINE
Payer: MEDICARE

## 2025-01-30 ENCOUNTER — HOSPITAL ENCOUNTER (OUTPATIENT)
Dept: RESPIRATORY THERAPY | Facility: HOSPITAL | Age: 75
Discharge: HOME | End: 2025-01-30
Attending: STUDENT IN AN ORGANIZED HEALTH CARE EDUCATION/TRAINING PROGRAM
Payer: MEDICARE

## 2025-01-30 DIAGNOSIS — M81.0 AGE-RELATED OSTEOPOROSIS WITHOUT CURRENT PATHOLOGICAL FRACTURE: ICD-10-CM

## 2025-01-30 DIAGNOSIS — Z00.00 MEDICARE ANNUAL WELLNESS VISIT, SUBSEQUENT: ICD-10-CM

## 2025-01-30 DIAGNOSIS — Z13.6 ENCOUNTER FOR SCREENING FOR CORONARY ARTERY DISEASE: ICD-10-CM

## 2025-01-30 DIAGNOSIS — R05.3 CHRONIC COUGH: ICD-10-CM

## 2025-01-30 PROCEDURE — 94060 EVALUATION OF WHEEZING: CPT | Performed by: INTERNAL MEDICINE

## 2025-01-30 PROCEDURE — 77080 DXA BONE DENSITY AXIAL: CPT | Performed by: INTERNAL MEDICINE

## 2025-01-30 PROCEDURE — 94726 PLETHYSMOGRAPHY LUNG VOLUMES: CPT | Performed by: INTERNAL MEDICINE

## 2025-01-30 PROCEDURE — 94729 DIFFUSING CAPACITY: CPT | Performed by: INTERNAL MEDICINE

## 2025-01-30 NOTE — PROCEDURES
[facility]  part of [facility]     Pulmonary Function Test     Beatriz Rosario Patient Status:  Outpatient    1950 MRN [id number]   Date of Exam 2025 PCP Zack Ellison MD           Spirometry   FEV1: 1.76 88%  FVC: 2.15 84%  FEV1/FVC: 0.82    Lung Volume   T.29 90%  RV : 2.09 107%    Diffusion Capacity   DLCO: 17.10 94%    Flow Volume Loop       Impression   No evidence of obstructive defect seen without significant postbronchodilator response observed.  Normal lung volumes.  Normal diffusion capacity    Jerrod Giordano DO  Pulmonary Critical Care Medicine  [facility]

## 2025-02-01 NOTE — PROGRESS NOTES
Please relay to pt:(if having cough should see pulm))    Andrade Ms Co, your pulmonary function test is normal. If you are still having difficulty with breathing would recommend to schedule follow up with pulmonologist for further evaluation  -Dr. Ellison

## 2025-02-07 ENCOUNTER — NURSE TRIAGE (OUTPATIENT)
Dept: INTERNAL MEDICINE CLINIC | Facility: CLINIC | Age: 75
End: 2025-02-07

## 2025-02-07 NOTE — TELEPHONE ENCOUNTER
Action Requested: Summary for Provider     []  Critical Lab, Recommendations Needed  [] Need Additional Advice  []   FYI    []   Need Orders  [] Need Medications Sent to Pharmacy  []  Other     SUMMARY: Home care    Reason for call: Cough  Onset: Data Unavailable    Patient states that she had itching in her throat after her pulmonary function test on 1/30/25. She had a sore throat, cough and body aches. She has been coughing since then but it is productive. She took Tylenol. She declined an appointment for now.     Reason for Disposition   Cough with no complications    Protocols used: Cough-A-OH

## 2025-02-25 ENCOUNTER — OFFICE VISIT (OUTPATIENT)
Facility: LOCATION | Age: 75
End: 2025-02-25
Payer: MEDICARE

## 2025-02-25 VITALS
SYSTOLIC BLOOD PRESSURE: 121 MMHG | DIASTOLIC BLOOD PRESSURE: 67 MMHG | HEIGHT: 63 IN | WEIGHT: 148 LBS | HEART RATE: 81 BPM | BODY MASS INDEX: 26.22 KG/M2

## 2025-02-25 DIAGNOSIS — R05.3 CHRONIC COUGH: ICD-10-CM

## 2025-02-25 DIAGNOSIS — J45.991 COUGH VARIANT ASTHMA (HCC): Primary | ICD-10-CM

## 2025-02-25 PROCEDURE — 99214 OFFICE O/P EST MOD 30 MIN: CPT | Performed by: STUDENT IN AN ORGANIZED HEALTH CARE EDUCATION/TRAINING PROGRAM

## 2025-02-25 PROCEDURE — 3008F BODY MASS INDEX DOCD: CPT | Performed by: STUDENT IN AN ORGANIZED HEALTH CARE EDUCATION/TRAINING PROGRAM

## 2025-02-25 PROCEDURE — 1159F MED LIST DOCD IN RCRD: CPT | Performed by: STUDENT IN AN ORGANIZED HEALTH CARE EDUCATION/TRAINING PROGRAM

## 2025-02-25 PROCEDURE — 3078F DIAST BP <80 MM HG: CPT | Performed by: STUDENT IN AN ORGANIZED HEALTH CARE EDUCATION/TRAINING PROGRAM

## 2025-02-25 PROCEDURE — 1160F RVW MEDS BY RX/DR IN RCRD: CPT | Performed by: STUDENT IN AN ORGANIZED HEALTH CARE EDUCATION/TRAINING PROGRAM

## 2025-02-25 PROCEDURE — 3074F SYST BP LT 130 MM HG: CPT | Performed by: STUDENT IN AN ORGANIZED HEALTH CARE EDUCATION/TRAINING PROGRAM

## 2025-02-25 PROCEDURE — 3044F HG A1C LEVEL LT 7.0%: CPT | Performed by: STUDENT IN AN ORGANIZED HEALTH CARE EDUCATION/TRAINING PROGRAM

## 2025-02-25 PROCEDURE — 3061F NEG MICROALBUMINURIA REV: CPT | Performed by: STUDENT IN AN ORGANIZED HEALTH CARE EDUCATION/TRAINING PROGRAM

## 2025-02-25 RX ORDER — FLUTICASONE PROPIONATE AND SALMETEROL 100; 50 UG/1; UG/1
1 POWDER RESPIRATORY (INHALATION) 2 TIMES DAILY
Qty: 60 EACH | Refills: 3 | Status: SHIPPED | OUTPATIENT
Start: 2025-02-25 | End: 2026-02-25

## 2025-02-25 RX ORDER — METHYLPREDNISOLONE 4 MG/1
TABLET ORAL
Qty: 1 EACH | Refills: 0 | Status: SHIPPED | OUTPATIENT
Start: 2025-02-25

## 2025-02-25 RX ORDER — BENZONATATE 100 MG/1
100 CAPSULE ORAL 3 TIMES DAILY PRN
Qty: 30 CAPSULE | Refills: 0 | Status: SHIPPED | OUTPATIENT
Start: 2025-02-25

## 2025-02-25 RX ORDER — ALBUTEROL SULFATE 90 UG/1
1 INHALANT RESPIRATORY (INHALATION)
Qty: 1 EACH | Refills: 11 | Status: SHIPPED | OUTPATIENT
Start: 2025-02-25

## 2025-02-25 NOTE — PROGRESS NOTES
OFFICE NOTE       The following individual(s) verbally consented to be recorded using ambient AI listening technology and understand that they can each withdraw their consent to this listening technology at any point by asking the clinician to turn off or pause the recording:    Patient name: Beatriz Rosario  Additional names:            Patient ID: Beatriz Rosario is a 74 year old female.  Today's Date: 02/25/25  Chief Complaint: Sore Throat (Sore throat and coughing, pt states started after PFT) and Back Pain (Right side back pain x1 month )      History of Present Illness  Beatriz Rosario is a 74 year old female who presents with ongoing cough and back pain.    She has been experiencing a persistent cough since January 1st, following a pulmonary function test. The cough is significant and is accompanied by body aches. She has a history of using an albuterol inhaler, initially requested after astrid COVID-19 last year, which provides some relief, although she has not used it recently. The cough is particularly bothersome during [employer] services.    She experiences back pain, specifically in the lower right back and upper back, which she associates with the area near the kidney. She is cautious about using naproxen due to concerns about her kidneys and prefers to use Tylenol for pain management.    She has a history of throat irritation and itchy tonsils, described as chronic and intermittent. She previously consulted an ENT specialist, Dr. Frederick Ybarra, who performed a scope examination and found no abnormalities. She is concerned about the chronic nature of her throat symptoms and the possibility of cancer, although reassured by the ENT specialist's findings.       Vitals:    02/25/25 1615   BP: 121/67   Pulse: 81   Weight: 148 lb (67.1 kg)   Height: 5' 3\" (1.6 m)     body mass index is 26.22 kg/m².  BP Readings from Last 3 Encounters:   02/25/25 121/67   01/06/25 112/63   11/27/24 122/67      The 10-year ASCVD risk score (Diego LUCIO, et al., 2019) is: 26.1%    Values used to calculate the score:      Age: 74 years      Sex: Female      Is Non- : No      Diabetic: Yes      Tobacco smoker: No      Systolic Blood Pressure: 121 mmHg      Is BP treated: Yes      HDL Cholesterol: 55 mg/dL      Total Cholesterol: 133 mg/dL  Results  LABS  HbA1c: 6.3%    DIAGNOSTIC  Pulmonary function test: No evidence of obstruction with bronchodilator response, normal lung volume, normal lung diffusion capacity (01/01/2025)  Laryngoscopy: Normal (Winter 2024)       Medications reviewed:  Current Outpatient Medications   Medication Sig Dispense Refill    losartan 50 MG Oral Tab Take 1 tablet (50 mg total) by mouth 2 (two) times daily. 180 tablet 3    amLODIPine 2.5 MG Oral Tab TAKE 1 TABLET BY MOUTH IN THE AM AND 1 TABLET BEFORE BEDTIME. 180 tablet 3    atorvastatin 40 MG Oral Tab Take 1 tablet (40 mg total) by mouth nightly. 90 tablet 3    hydroCHLOROthiazide 12.5 MG Oral Tab Take 1 tablet (12.5 mg total) by mouth 2 (two) times daily. (Patient taking differently: Take 1 tablet (12.5 mg total) by mouth as needed (as needed for feet swelling).) 180 tablet 3    montelukast 10 MG Oral Tab Take 1 tablet (10 mg total) by mouth nightly. (Patient taking differently: Take 1 tablet (10 mg total) by mouth once as needed.) 30 tablet 3    Calcium Carbonate-Vitamin D 600-200 MG-UNIT Oral Cap Take  by mouth. 1 po q12hrs.      aspirin 81 MG Oral Tab EC Take  by mouth. take 1 tablet (81MG)  by ORAL route  every day      Multiple Vitamin (MULTI-DAY) Oral Tab Take  by mouth.      methylPREDNISolone 4 MG Oral Tablet Therapy Pack Take as directed with food. (Patient not taking: Reported on 2/25/2025) 1 each 0    albuterol 108 (90 Base) MCG/ACT Inhalation Aero Soln Inhale 1 puff into the lungs every 4 to 6 hours as needed. FOR ASTHMA. Pharmacist, please switch to formulary alternative per insurance coverage, ok to replace  with proair, ventolin, proventil, or any other albuterol inhaler. -Dr. Ellison 1 each 11    Glucose Blood (TRUE METRIX BLOOD GLUCOSE TEST) In Vitro Strip 1 strip by In Vitro route daily. 100 strip 11    Lancets Does not apply Misc Test once daily 100 each 11    Blood Glucose Monitoring Suppl (TRUE METRIX AIR GLUCOSE METER) w/Device Does not apply Kit Use to test Blood sugar daily 1 kit 0    latanoprost (XALATAN) 0.005 % Ophthalmic Solution  (Patient not taking: Reported on 10/10/2024)  3         Assessment & Plan    There are no diagnoses linked to this encounter.  Assessment & Plan  Chronic Cough  Persistent cough with normal pulmonary function test results. Possible cough variant asthma. Albuterol provides some relief.  -Continue Albuterol as needed every 4 hours.  -Prescribe steroid inhaler.  -Prescribe short course of oral steroids.  -Consider Tessalon for cough if bothersome.    Back Pain  Complaints of lower right and upper back pain. Possible relief with oral steroids.  -After completion of oral steroids, consider Naproxen 500mg twice daily with meals for one month if pain persists and is tolerable for the patient. If not, patient can use Tylenol as needed.  -Refer to MRI results for further assessment.    Throat Irritation  Chronic throat irritation and itchiness. Normal findings on recent scope by ENT.  -Refer to pulmonologist and GI for further evaluation.  -Consider over-the-counter Chloraseptic for sore throat relief.    Diabetes Management  Last A1C was 6.3.  -Follow up in two months to check A1C.  -Ensure retinal eye exam is completed with ophthalmologist and results are sent to the office.    Follow-up  Multiple specialist appointments scheduled in the coming months.  -Follow up in two months after specialist visits to discuss findings and next steps.       Follow Up: As needed/if symptoms worsen or No follow-ups on file..     Objective/ Results:   Physical Exam  HENT:      Head: Normocephalic and  atraumatic.   Cardiovascular:      Rate and Rhythm: Normal rate and regular rhythm.   Pulmonary:      Effort: Pulmonary effort is normal.      Breath sounds: Normal breath sounds.   Abdominal:      General: Bowel sounds are normal.      Palpations: Abdomen is soft.   Musculoskeletal:         General: Normal range of motion.   Neurological:      General: No focal deficit present.      Mental Status: She is alert and oriented to person, place, and time.   Psychiatric:         Mood and Affect: Mood normal.        Physical Exam       Reviewed:    Patient Active Problem List    Diagnosis    PAD (peripheral artery disease)    History of ductal carcinoma in situ (DCIS) of right breast    Chronic cough    Age-related osteoporosis without current pathological fracture    Pancreatic cyst (HCC)    Medicare annual wellness visit, subsequent    Hyperlipidemia associated with type 2 diabetes mellitus (HCC)    Colon cancer screening    Atherosclerosis of aorta     Ct abd/pelvis 8/2015      Glaucoma    Type 2 diabetes mellitus without complication, without long-term current use of insulin (HCC)    Vitamin D deficiency    Allergic rhinitis    Hypertension associated with type 2 diabetes mellitus (HCC)      Allergies[1]     Social History     Socioeconomic History    Marital status:     Number of children: 0   Occupational History    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     Employer: [employer]   Tobacco Use    Smoking status: Never     Passive exposure: Never    Smokeless tobacco: Never   Vaping Use    Vaping status: Never Used   Substance and Sexual Activity    Alcohol use: No     Alcohol/week: 0.0 standard drinks of alcohol    Drug use: No   Other Topics Concern    Caffeine Concern Yes     Comment: 2 cups of coffee per day      Social Drivers of Health      Received from Copyright Agent, Copyright Agent    [facility]      Review of Systems    All other systems negative unless otherwise stated in  ROS or HPI above.       Zack Ellison MD  Internal Medicine       Call office with any questions or seek emergency care if necessary.   Patient understands and agrees to follow directions and advice.      ----------------------------------------- PATIENT INSTRUCTIONS-----------------------------------------     There are no Patient Instructions on file for this visit.        The 21st Century Cures Act makes medical notes available to patients in the interest of transparency.  However, please be advised that this is a medical document.  It is intended as a peer to peer communication.  It is written in medical language and may contain abbreviations or verbiage that are technical and unfamiliar.  It may appear blunt or direct.  Medical documents are intended to carry relevant information, facts as evident, and the clinical opinion of the practitioner.          [1]   Allergies  Allergen Reactions    Latex

## 2025-02-27 ENCOUNTER — HOSPITAL ENCOUNTER (OUTPATIENT)
Dept: MRI IMAGING | Facility: HOSPITAL | Age: 75
Discharge: HOME | End: 2025-02-27
Attending: SURGERY
Payer: MEDICARE

## 2025-02-27 DIAGNOSIS — R92.8 ABNORMAL MRI, BREAST: Primary | ICD-10-CM

## 2025-02-27 DIAGNOSIS — R92.30 DENSE BREAST TISSUE: ICD-10-CM

## 2025-02-27 DIAGNOSIS — Z86.000 HISTORY OF DUCTAL CARCINOMA IN SITU (DCIS) OF RIGHT BREAST: ICD-10-CM

## 2025-02-27 PROCEDURE — 77049 MRI BREAST C-+ W/CAD BI: CPT | Performed by: SURGERY

## 2025-02-27 PROCEDURE — A9575 INJ GADOTERATE MEGLUMI 0.1ML: HCPCS | Performed by: SURGERY

## 2025-02-27 RX ORDER — GADOTERATE MEGLUMINE 376.9 MG/ML
15 INJECTION INTRAVENOUS
Status: COMPLETED | OUTPATIENT
Start: 2025-02-27 | End: 2025-02-27

## 2025-02-27 RX ADMIN — GADOTERATE MEGLUMINE 13 ML: 376.9 INJECTION INTRAVENOUS at 10:51:00

## 2025-02-27 NOTE — IMAGING NOTE
Discussed recommended breast biopsy with patient.  Patient is being recommended for MRI biopsy of the  right  Breast  by Dr. Lassiter . Orders received .  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 Pt is  does not have any children. Pt was provided Monday 3/10 /25 checking in at [address] [facility] [address].  Hx LATEX ALLERGY   Hx last aspirin last dose was Wed 2/6  will stay off takes on own  Pt history discussed as below:  Pt history of biopsy: Yes hx right  DCIS dx 20 years ago at [place] lumpectomy rad tx 5 years tamoxifen Stage 0      Family history of cancer: yes  sister breast  ca dx  50's  Pt history of breast cancer: yes dx 54   Hx BCP use:         no           HRT use: n o ivf no     CONCLUSION:       Single site right breast MRI guided biopsy recommended for a 6 mm enhancing mass in the lower outer posterior right breast mass (series 03838 image 81 and series 27836, image 58).            BI-RADS CATEGORY:   DIAGNOSTIC CATEGORY 4 - SUSPICIOUS       RECOMMENDATIONS:   MRI-GUIDED BREAST BIOPSY: RIGHT BREAST  x 1      Active recommendations from prior exams:   ROUTINE MAMMOGRAM AND CLINICAL EVALUATION IN [#MONTHS] MONTHS. Due on 06/25/2025.               Dictated by (CST): Dot Lassiter MD on 2/27/2025 at 2:18 PM       Finalized by (CST): Dot Lassiter MD on 2/27/2025 at 2:42 PM       Recommedations :                      see [facility] for dictated radiology report   Reviewed pertinent patient history, family history of cancer, and patient medications  Discussed with patient Radiology’s protocol regarding medications, as well as over-the-counter vitamin or herbal supplements, as follows:  -All herbal supplements, Vitamin E, Fish Oil    -All NSAIDs (Ibuprofen, Motrin, Advil, Aleve, or other antiinflammatory medication)  and Aspirin  81mg currently being taken    not  recommended or prescribed by  your physician  should be held for 5  days prior to biopsy.  Denies usage   -Aspirin 81 mg being taken  related to a cardiac condition  or prescribed by your  physician should be held at the  direction of your physician.  Informed patient to call ordering physician for guidelines takes 81 mg daily on own will stay off   -Blood thinners/antiplatelet medications (Coumadin, Plavix  ect) should be held at the  direction of your physician.  Informed patient to call ordering physician for guidelines denies usage     Reviewed MRI (MAGNETIC RESONANCE IMAGING) biopsy procedure, as below.  For this procedure, you will be lying on your stomach with your breast in compression .    AN IV WILL BE STARTED WHEN YOU ARRIVE AND YOUR KIDNEY FUNCTION WILL BE CHECK IF NOT DONE PREVIOUSLY. ENCOURAGED PATIENT TO KEEP WELL HYDRATED 2-3 DAYS BEFORE THE BIOPSY TO AID WITH  PROMOTING ADEQUATE KIDNEY FUNCTION WHILE RECEIVING CONTRAST. You will receive contrast to assist locating the area to be biopsied.  Mri  imaging will be   reviewed and compared to previous mri.   Once site is confirmed the Radiologist will then inject a local numbing medication into the area and then use a needle to collect cells from the site.  A marker, or clip, will then be placed in the biopsied area.  This marker is placed so this biopsy site is able to be accurately located upon future breast imaging.  After the clip is placed, the RN will place a dressing on the biopsy site.  Additional mammography films will then be taken to assure correct placement of the marker.    Educated the patient they will be awake during this procedure and are able to drive themselves home if they wish.    Educated patient that some soreness may occur after biopsy.  Discussed use of a supportive bra and ice packs after procedure, to decrease soreness.    Discussed with patient no swimming, bathing,  hot tubs , or submerging underwater for 5 days and   until the incision is closed and healed.  Educated patient on lifting restrictions - nothing heavier than a gallon of milk for 24-48 hours  after the procedure.      Discussed with patient that some soreness and bruising is normal after biopsy but that prolonged or increased pain and bruising should be reported to the ordering physician.  An ace wrap will be applied over bra for added support and should be left on for 24 hours post procedure.  Reviewed results process with patient and shared that pathology results will be available within 2-3 business days of their biopsy.  Discussed results will be communicated by their ordering physician unless otherwise indicated.  Educated patient that once we receive an order from their physician our radiology secretaries will be calling them to schedule their biopsy. iscussed recommended breast biopsy with patient.

## 2025-03-03 NOTE — DISCHARGE INSTRUCTIONS
The Doctor (Radiologist) who performed your procedure was:     Place an ice pack over the biopsy site on top of your bra or on top of the ACE wrap (never apply ice directly over skin) for 10-15 minutes of every hour until bedtime for your comfort and to decrease bleeding.  Keep your sports bra or the ACE wrap (stereotactic and MRI biopsy) in place for 24 hours after your biopsy. This compression decreases bleeding and breast movement for your comfort. Wear a supportive bra for the next couple of days for comfort (sports bra for sleep).   Continue to wear, preferably, a sports bra or good supportive bra for 1 week and take off only to shower.  No baths or showers during the first 24 hours after biopsy. After this time you may take a shower. It's okay if the strips get wet but do not soak them. NO saunas, hot tubs or swimming until steri-strips fall off (approx. 5 days). This prevents infection and allows time for them to completely close and heal.  DO NOT remove the steri-strips. They will fall off in 5 days. If any type of irritation (redness, itching or blisters) develops in the area around the steri-strips, remove them gently. If the steri-strips do not fall off after 5 days, gently remove them. Keep the area clean and dry.  It is normal to have mild discomfort and bruising at the biopsy site.  You may take Tylenol as needed for discomfort, as long as you have no allergies to Tylenol. Do not take aspirin, motrin, ibuprofen or any medication containing NSAID (non-steroidal anti-inflammatory drug) product for 48 hours.  DO NOT participate in strenuous activity (aerobics, heavy lifting, housework, gardening, etc.) 48 hours after your biopsy to prevent bleeding.  You will receive results in 2-3 business days.  If you are having an MRI breast biopsy or an Ultrasound guided breast biopsy, you will be billed for the biopsy and unilateral mammogram separately.  If you have any questions about the procedure or your  results, please contact the Breast Care Coordinator Nurse at [phone].  Notify your ordering physician or primary physician for increased bleeding, pain or fever over 100. Or contact a Radiology Nurse at [phone] between 8am-4pm (after 4pm, your call will be directed to the [place] [facility]).

## 2025-03-10 ENCOUNTER — HOSPITAL ENCOUNTER (OUTPATIENT)
Dept: MAMMOGRAPHY | Facility: HOSPITAL | Age: 75
Discharge: HOME | End: 2025-03-10
Attending: SURGERY
Payer: MEDICARE

## 2025-03-10 ENCOUNTER — HOSPITAL ENCOUNTER (OUTPATIENT)
Dept: MRI IMAGING | Facility: HOSPITAL | Age: 75
Discharge: HOME | End: 2025-03-10
Attending: SURGERY
Payer: MEDICARE

## 2025-03-10 DIAGNOSIS — R92.8 ABNORMAL MRI, BREAST: ICD-10-CM

## 2025-03-10 PROCEDURE — 88342 IMHCHEM/IMCYTCHM 1ST ANTB: CPT | Performed by: SURGERY

## 2025-03-10 PROCEDURE — 88360 TUMOR IMMUNOHISTOCHEM/MANUAL: CPT | Performed by: SURGERY

## 2025-03-10 PROCEDURE — 77065 DX MAMMO INCL CAD UNI: CPT | Performed by: SURGERY

## 2025-03-10 PROCEDURE — 19085 BX BREAST 1ST LESION MR IMAG: CPT | Performed by: SURGERY

## 2025-03-10 PROCEDURE — 88341 IMHCHEM/IMCYTCHM EA ADD ANTB: CPT | Performed by: SURGERY

## 2025-03-10 PROCEDURE — 88305 TISSUE EXAM BY PATHOLOGIST: CPT | Performed by: SURGERY

## 2025-03-10 PROCEDURE — A9575 INJ GADOTERATE MEGLUMI 0.1ML: HCPCS | Performed by: SURGERY

## 2025-03-10 RX ORDER — GADOTERATE MEGLUMINE 376.9 MG/ML
15 INJECTION INTRAVENOUS
Status: COMPLETED | OUTPATIENT
Start: 2025-03-10 | End: 2025-03-10

## 2025-03-10 RX ADMIN — GADOTERATE MEGLUMINE 13 ML: 376.9 INJECTION INTRAVENOUS at 12:31:00

## 2025-03-10 NOTE — PROCEDURES
[facility]  part of [facility]  Procedure Note    Beatriz Fisher [facility] Patient Status:  Outpatient    1950 MRN [id number]   Location [facility] Attending Licha Lind MD   Hosp Day # 0 PCP Zack Ellison MD     Procedure: MRI guided biopsy, right breast, x1 site    Pre-Procedure Diagnosis:  breast mass    Post-Procedure Diagnosis: same    Anesthesia:  Local    Findings:  breast mass    Specimens: multiple core biopsies    Blood Loss:  0    Tourniquet Time: 0  Complications:  None  Drains:  0    Secondary Diagnosis:  none    Keon Elliott MD  3/10/2025

## 2025-03-10 NOTE — IMAGING NOTE
To Radiology holding area hx as follow:   Impression   CONCLUSION:    Single site right breast MRI guided biopsy recommended for a 6 mm enhancing mass in the lower outer posterior right breast mass (series 16425 image 81 and series 06140, image 58).    BI-RADS CATEGORY:  DIAGNOSTIC CATEGORY 4 - SUSPICIOUS    RECOMMENDATIONS:  MRI-GUIDED BREAST BIOPSY: RIGHT BREAST  x 1     1110 Post instructions given verbal et written. Pt verbalizes understanidng and agreement.     IV started    1121 Dr VALIENTE here procedure explained questions answered .    Consent verified and obtained. Site marked RIGHT Breast    1123 Time out taken    1125 Patient to mri table  scans taken.    1202 Betadine as skin prep.    1203 Lidocaine 1% 10 milligrams per ml 5 ml given.    1204 Lidocaine 1% with epinephrine 1:100,000 units 200 milligrams per 20 ml  Total amount given 15ml.    1205 9 Gauge needle placed     1216 Sampling begins     1217 Sampling completed.    1217 Formalin added to samples    HOURGLASS Breast clip placed at 1217. Procedure complete pressure to site 10 minutes. Area cleaned steri strips to site. Ice pack to site.Iv removed by MRI staff.      1235 To mammography department for post clip images. Bb nipple marker to be removed from nipple by mammo staff. Bra to be applied by patient. A 6 \" ace wrap secured over Bra. Then mammo department to discharge patient.    Cores taken to pathology by this rn.

## 2025-03-11 ENCOUNTER — TELEPHONE (OUTPATIENT)
Age: 75
End: 2025-03-11

## 2025-03-11 ENCOUNTER — TELEPHONE (OUTPATIENT)
Dept: SURGERY | Facility: CLINIC | Age: 75
End: 2025-03-11

## 2025-03-11 NOTE — TELEPHONE ENCOUNTER
I spoke to the patient regarding DCIS diagnosis. Patient agreeable to genetic testing. We also discussed that it is unclear if this is a recurrence or new primary area of DCIS. I explained the patient that her case will be discussed at our multidisciplinary tumor board to see if she is a candidate for a repeat lumpectomy with or without radiation and possible repeat risk reducing endocrine therapy.  Once we have received her genetic testing results and I have discussed her case with the team we will confirm her surgical plan.

## 2025-03-12 NOTE — TELEPHONE ENCOUNTER
NN phoned patient to discuss next steps after recent biopsy. NN informed patient that Dr. Lind had requested NN team reach out to coordinate genetic testing appointment. NN offered appointment for 3/14 with Татьяна Castellon. Patient agreeable. NN instructed patient where to park and where to check in. Patient appreciative of the call and encouraged to reach out with questions or concerns.

## 2025-03-14 ENCOUNTER — OFFICE VISIT (OUTPATIENT)
Age: 75
End: 2025-03-14
Attending: STUDENT IN AN ORGANIZED HEALTH CARE EDUCATION/TRAINING PROGRAM
Payer: MEDICARE

## 2025-03-14 DIAGNOSIS — Z80.9 FAMILY HISTORY OF CANCER: ICD-10-CM

## 2025-03-14 DIAGNOSIS — Z80.3 FAMILY HISTORY OF BREAST CANCER: ICD-10-CM

## 2025-03-14 DIAGNOSIS — Z86.000 HISTORY OF DUCTAL CARCINOMA IN SITU (DCIS) OF RIGHT BREAST: ICD-10-CM

## 2025-03-14 DIAGNOSIS — D05.11 DUCTAL CARCINOMA IN SITU (DCIS) OF RIGHT BREAST: Primary | ICD-10-CM

## 2025-03-14 NOTE — PROGRESS NOTES
Patient Name: Beatriz Rosario  YOB: 1950  Date of Visit: 3/14/2025    Reason for visit: Ms. Rosario was seen for the purposes of genetic counseling due to her recent diagnosis of DCIS at age 74y with a personal history of DCIS diagnosed around 20 years ago and a family history of breast cancer. The purpose of this visit was to review information regarding genetic testing options for mutations in high penetrance cancer susceptibility genes.    Referring Provider: Licha Lind MD    Medical History: Ms. Rosario is a pleasant 74 year old female presenting with a recent diagnosis of DCIS (ER+) of the right breast on 3/10/2025.     She has a personal history of DCIS of the right breast ~20 years ago, in her early-50s, treated with a lumpectomy, RT, and tamoxifen. She also has a remote h/o prior benign left breast biopsies.    She has met with Dr. Lind with surgery and was recommended to have genetic testing to help finalize surgical decision-making.     She denies any other cancer hx. She had a [facility] in 2016.     Ms. Rosario achieved menarche at approximately 14 years of age, is post-menopausal, and has never been pregnant. Ms. Rosario has a 0-year history of oral contraceptive use and denies any fertility or hormone replacement use.      Relevant Family History: Ms. Rosraio has 5 brothers and 4 sisters. One of her sisters (76-77y) was dx with breast cancer at age ~50y.    Her mother  at 72y dt a MI. There is 1 maternal aunt who  in her 60-70s dt metastatic breast cancer dx in her 60s. This aunt had 4-6 children, one son  <50y dt esophageal cancer. Neither maternal grandparent is reported to have had cancer.     Her father  at 82y dt a MI/heart disease. There was 1 paternal uncle with 4 children. The paternal grandparents both  at older ages.     The rest of the family history is negative for other significant genetic conditions, cancers, or birth defects of any kind. See scanned pedigree for full family  history reported during the session.    Ms. Rosario's maternal ethnicity is Swazi and her paternal ethnicity is Chinese/Swazi. She is not of any Ashkenazi Yarsanism heritage.     Summary: The majority of cancers are sporadic whereas approximately 10-30% of cases of cancer cases are attributed to familial factors such as unidentified low penetrance genes in the family or shared environmental factors.  Approximately 5-10% of cancers are related to a hereditary cancer syndrome.  Signs of a hereditary cancer syndrome include some rare cancers, common cancers occurring at unusually young ages, multiple primary cancers in the same individual, multiple colorectal polyps, or the same type of cancer or related cancers (e.g., breast and ovarian, colorectal and endometrial) in three or more individuals in the same lineage.     Cancer is usually caused by gene mutations that occur randomly in one or a few cells of the body. Such gene changes, called somatic mutations, may arise as a natural consequence of aging or when a cell’s DNA has been damaged. Acquired mutations are only present in some of the body’s cells, and they are not passed on from parents to their children.    However, in the small percentage of people with a hereditary cancer syndrome, the disease is due to a different type of mutation called a hereditary mutation, or germline mutation. These mutations are usually inherited from one or both of the person’s parents, and are present in nearly every cell of the body. Because hereditary mutations are present in the DNA of sperm and egg cells, they can be passed down in families. People who carry such hereditary mutations do not necessarily get cancer, but their risk of developing the disease at some point during their lifetime is higher than average. When a personal and/or family history doesn't clearly fit a single hereditary cancer syndrome, panel genetic testing examining multiple genes in which pathogenic mutations  cause hereditary cancer syndromes is appropriate.     If testing is performed, three results are possible: positive, negative, and variant of uncertain significance.  A positive result indicates a pathogenic variant (harmful gene mutation) has been identified, and there is an increased risk for the cancers associated with the specific gene.  Since pathogenic variants in most cancer susceptibility genes are inherited in an autosomal dominant fashion, siblings and children of individuals with a pathogenic variant have a 50% risk of carrying the same familial pathogenic variant as well.      A negative test result would indicate that no pathogenic variant was identified in a cancer susceptibility gene.  While testing detects gene mutations, it is possible for a genetic variant to be present and go undetected.  It is also possible for a genetic variant to be located in a gene other than those being tested.     A variant of uncertain significance means that a change has been identified a cancer susceptibility gene; however, it is uncertain if the variant is pathogenic or a non-deleterious (benign) change.  With time, the variant may be reclassified as either pathogenic or benign.    Since pathogenic variant identification is necessary, an affected family member is preferred in order to confirm that a pathogenic variant is indeed present in a particular family.  If the pathogenic variant can be identified in an affected individual, this information can be used to screen other at-risk individuals in the family.  Individuals who are positive for the same pathogenic variant would be expected to have a much greater risk for developing hereditary cancer during their lifetime than the general population and surveillance and management would be rigorous.  For those individuals who are found to not carry the pathogenic variant, risks for developing cancer during their lifetime would return to those expected for individuals in the  general population.  It should be emphasized that absence of a pathogenic variant in an at-risk individual would not eliminate their risk for cancer, but simply return them to the risk expected for the general population. If no affected individual is available for testing, a negative result, while reassuring, cannot be completely informative of the familial cancer risk as it is unknown whether no pathogenic variant was found because the individual tested is truly negative for the familial pathogenic variant or whether the familial pathogenic variant was unable to be detected by the genetic testing method used. In such cases, screening and follow-up should be guided by personal and family history.     Because Ms. Rosario was diagnosed with DCIS in her early-50s and again at age 74y, with a family history of breast cancer in her sister (dx age 50y) and maternal aunt (dx 60s), genetic testing for pathogenic variants in high-penetrance cancer susceptibility genes is indicated based on the NCCN guidelines (BOP V.3.2025).      Ms. Rosario appeared to understand the information presented. On the day of the visit Ms. Rosario elected to proceed with genetic testing for hereditary cancer syndromes. My office will call Ms. Rosario as soon as results are received; post-test counseling can be scheduled at that time. Thank you for allowing me to participate in the care of your patient; please do not hesitate to contact my office if you have any questions or concerns, [phone].    Plan:   Blood was drawn and sent out for [email or web address]'s hereditary breast cancer STAT panel (9 high-risk hereditary breast cancer genes; TAT: ~5-12 days, average of 7 days) with reflex to the common hereditary cancers + RNA panel (48 gene total; TAT: ~<1-2 weeks after STAT results).    The Genetics office will call Ms. Rosario when results are available.  Recommendations for Ms. Rosario and family members will depend the above genetic testing results.     Send to:  Lelo/Iliana/Terra Castellon, MS, LCGC

## 2025-03-20 ENCOUNTER — GENETICS ENCOUNTER (OUTPATIENT)
Dept: HEMATOLOGY/ONCOLOGY | Facility: HOSPITAL | Age: 75
End: 2025-03-20

## 2025-03-20 DIAGNOSIS — Z13.71 BRCA GENE MUTATION NEGATIVE IN FEMALE: Primary | ICD-10-CM

## 2025-03-20 NOTE — PROGRESS NOTES
Patient Name: Beatriz Rosario  YOB: 1950    Referring Provider:  Licha Lind MD      Reason for Referral:  Ms. Rosario had genetic testing performed on 3/14/2025 because of a recent diagnosis of DCIS at age 74y with a personal history of DCIS diagnosed around 20 years ago and a family history of breast cancer.      Genetic Testing Result:  Negative. No pathogenic variant was found in the following 48 genes on the hereditary breast cancer STAT panel and common hereditary cancers + RNA panel: APC, NICK, AXIN2, BAP1, BARD1, BMPR1A, BRCA1, BRCA2, BRIP1, CDH1, CDK4, CDKN2A, CHEK2, CTNNA1, DICER1, EPCAM, FH, GREM1, HOXB13, KIT, MBD4, MEN1, MLH1, MSH2, MSH3, MSH6, MUTYH, NF1, NTHL1, PALB2, PDGFRA, PMS2, POLD1, POLE, PTEN, RAD51C, RAD51D, SDHA, SDHB, SDHC, SDHD, SMAD4, SMARCA4, STK11, TP53, TSC1, TSC2, VHL. Please refer to the report from [facility] for additional testing information. These STAT and reflex results were discussed with Ms. Rosario via telephone on 3/20/2025 and via telephone voice-message at [phone] on 3/24/2025, respectively.     Summary and Plan:   These results indicate that Ms. Rosario was not found to have a pathogenic variant (harmful genetic mutation) in any of the genes listed above. No pathogenic variants associated with hereditary cancer syndromes were identified. The limitations of the testing were discussed with Ms. Rosario including the chance that a pathogenic variant in a gene other than those included in this analysis might be the cause of cancer in Ms. Rosario or in relatives.     Medical management and surveillance for Ms. Rosario and other family members should be based on their personal and family history. All medical management decisions should be made with a physician.     I encouraged Ms. Rosario to share the genetic test results with her relatives so that they may discuss the implications of this information with their health providers. Ms. Rosario is also encouraged to contact me on an annual basis to learn if  there have been any updates in genetic information that would apply or if there are changes in the personal and/or family history. Please do not hesitate to contact my office if you have any questions or concerns, [phone].     Татьяна Castellon MS, CGC

## 2025-03-21 ENCOUNTER — TELEPHONE (OUTPATIENT)
Dept: SURGERY | Facility: CLINIC | Age: 75
End: 2025-03-21

## 2025-03-21 NOTE — TELEPHONE ENCOUNTER
LM for patient regarding new steps in care. Patient requires mastectomy and was encouraged to call the office back to schedule next steps.  Her genetic testing was negative.

## 2025-03-24 PROBLEM — Z13.71 BRCA GENE MUTATION NEGATIVE IN FEMALE: Status: ACTIVE | Noted: 2025-03-01

## 2025-03-26 ENCOUNTER — TELEPHONE (OUTPATIENT)
Age: 75
End: 2025-03-26

## 2025-03-26 NOTE — TELEPHONE ENCOUNTER
NN LVM requesting patient return call at her earliest convenience to discuss plan of care and next step.s

## 2025-03-28 ENCOUNTER — OFFICE VISIT (OUTPATIENT)
Dept: PULMONOLOGY | Facility: CLINIC | Age: 75
End: 2025-03-28
Payer: MEDICARE

## 2025-03-28 VITALS
HEART RATE: 86 BPM | RESPIRATION RATE: 16 BRPM | SYSTOLIC BLOOD PRESSURE: 108 MMHG | DIASTOLIC BLOOD PRESSURE: 58 MMHG | WEIGHT: 147 LBS | OXYGEN SATURATION: 96 % | BODY MASS INDEX: 26.05 KG/M2 | HEIGHT: 63 IN

## 2025-03-28 DIAGNOSIS — R91.1 LUNG NODULE: Primary | ICD-10-CM

## 2025-03-28 PROCEDURE — 3078F DIAST BP <80 MM HG: CPT | Performed by: INTERNAL MEDICINE

## 2025-03-28 PROCEDURE — 1159F MED LIST DOCD IN RCRD: CPT | Performed by: INTERNAL MEDICINE

## 2025-03-28 PROCEDURE — 3074F SYST BP LT 130 MM HG: CPT | Performed by: INTERNAL MEDICINE

## 2025-03-28 PROCEDURE — 99204 OFFICE O/P NEW MOD 45 MIN: CPT | Performed by: INTERNAL MEDICINE

## 2025-03-28 PROCEDURE — 1126F AMNT PAIN NOTED NONE PRSNT: CPT | Performed by: INTERNAL MEDICINE

## 2025-03-28 PROCEDURE — 3008F BODY MASS INDEX DOCD: CPT | Performed by: INTERNAL MEDICINE

## 2025-03-28 NOTE — H&P
Referring Physician  Zack Ellison MD    Chief Complaint  Cough, lung nodule    History of Present Illness  Patient is a pleasant 74-year-old female who presents to pulmonary clinic for initial visit.  Admits to some intermittent cough over the course the last several months to years.  Some mild improvement while on montelukast and PPI therapy.  Occasionally uses Claritin.  Unaware of frequent postnasal drip but currently clear strep throat.  Denies significant improvement with albuterol MDI.  Denies any history of tobacco abuse or known history of lung disease.  States she believes that environmental factors such as cold weather and certain micaela conditions worsening her cough.  Denies dyspnea symptoms.    Review of Systems  Constitutional: denies weight loss, fevers, chills, weakness, fatigue, recent illness  HEENT: denies epistaxis, sore throat, postnasal drip  Cardio: denies chest pain, chest pressure, palpitations  Respiratory: denies dyspnea, cough, wheezing, hemoptysis   GI: denies nausea, vomiting, abdominal pain  : denies dysuria, hematuria  Musculoskeletal: denies arthralgia, myalgia  Integumentary: denies rash, itching  Neurological: denies syncope, weakness, dizziness,   Psychiatric: denies depression, anxiety  Hematologic: denies bruising    Past Medical History  Past Medical History:    Allergic rhinitis    BRCA gene mutation negative in female    Negative 48 gene hereditary cancer panel ([facility] common hereditary cancers + RNA panel), report in media tab    Cataracts, bilateral    Diabetes (HCC)    Diabetes mellitus, type II (HCC)    Ductal carcinoma in situ of right breast    Endometrial thickening on ultra sound    Glaucoma, right eye    Lumbar arthropathy    Osteoarthritis    Other and unspecified hyperlipidemia    Positive PPD    S/P INH prophylaxis for 6 months     Unspecified essential hypertension    Vitamin B12 deficiency anemia    Vitamin D deficiency        Surgical History  Past Surgical  History:   Procedure Laterality Date    Biopsy of breast, needle core Bilateral 2011    Breast biopsy Right     [facility]    Colonoscopy      Colonoscopy  2021    Colonoscopy N/A 2021    Procedure: COLONOSCOPY,;  Surgeon: Dirk Robbins MD;  [facility]: [facility]    Hysterectomy  2016    Laparoscopy,vag hysterectomy  2016    w/ BSO, benign    Lumpectomy right  2010    Mri breast biopsy w/ clip 1 site right (cpt=19085)  03/10/2025    Needle biopsy left Left 2011    Radiation right      breast       Family History  Family History   Problem Relation Age of Onset    Heart Disease Mother 77        CAD    Hypertension Mother         mycardial infarction    Musculo-skelatal Disorder Mother         osteoporosis    Heart Disorder Mother     Stroke Father 84    Hypertension Father 82        myocardial infarction    Heart Disease Father 82        CAD    Heart Disorder Father     Breast Cancer Sister 50        chemo; doing fine    Breast Cancer Maternal Aunt 60 - 69        post,  dt metastases    Breast Cancer Self 60    DCIS Self 60        Social History  Tobacco: Denies  Alcohol: Denies significant intake  Illicit Drugs: Denies    Medications  Medications Ordered Prior to Encounter[1]    Allergies  Allergies[2]    Physical Exam  Constitutional: no acute distress  HEENT: PERRL  Neck: supple, no JVD  Cardio: RRR, S1 S2  Respiratory: clear to auscultation bilaterally, no wheezing, rales, rhonchi, crackles  GI: abdomen soft, non tender, active bowel sounds, no organomegaly  Extremities: no clubbing, cyanosis, edema  Neurologic: no gross motor deficits  Skin: warm, dry  Lymphatic: no supraclavicular lymphadenopathy     Imaging  Cardiac overread on 2025 with evidence of 9 mm nodule within right lung along with micronodules seen    Pulmonary Function Testing  Performed 2025 with no significant obstruction at postbronchodilator response observed.  Normal lung  volumes.  Normal diffusion capacity.    Assessment  1.  Chronic cough  2.  Lung nodules    Plan  -Patient presents today for evaluation of chronic cough.  Likely component of upper airway cough syndrome and possible GERD.  Advised patient to continue montelukast and antihistamine therapy.  Will monitor response to close needs nasal spray.  Continue PPI.  -CT cardiac overread with lung nodules seen with the largest measuring 9 mm subpleural nodule.  No suspicious characteristics.  Will obtain follow-up CT chest in 6 months duration.    Jerrod Giordano DO  Pulmonary Critical Care Medicine  [facility]  3/28/2025  11:42 AM        [1]   Current Outpatient Medications on File Prior to Visit   Medication Sig Dispense Refill    methylPREDNISolone 4 MG Oral Tablet Therapy Pack Take as directed with food. 1 each 0    fluticasone-salmeterol 100-50 MCG/ACT Inhalation Aerosol Powder, Breath Activated Inhale 1 puff into the lungs 2 (two) times daily. 60 each 3    albuterol 108 (90 Base) MCG/ACT Inhalation Aero Soln Inhale 1 puff into the lungs every 4 to 6 hours as needed. FOR ASTHMA. Pharmacist, please switch to formulary alternative per insurance coverage, ok to replace with proair, ventolin, proventil, or any other albuterol inhaler. -Dr. Ellison 1 each 11    benzonatate 100 MG Oral Cap Take 1 capsule (100 mg total) by mouth 3 (three) times daily as needed for cough. 30 capsule 0    losartan 50 MG Oral Tab Take 1 tablet (50 mg total) by mouth 2 (two) times daily. 180 tablet 3    amLODIPine 2.5 MG Oral Tab TAKE 1 TABLET BY MOUTH IN THE AM AND 1 TABLET BEFORE BEDTIME. 180 tablet 3    atorvastatin 40 MG Oral Tab Take 1 tablet (40 mg total) by mouth nightly. 90 tablet 3    Glucose Blood (TRUE METRIX BLOOD GLUCOSE TEST) In Vitro Strip 1 strip by In Vitro route daily. 100 strip 11    Lancets Does not apply Misc Test once daily 100 each 11    hydroCHLOROthiazide 12.5 MG Oral Tab Take 1 tablet (12.5 mg total) by mouth 2 (two)  times daily. (Patient taking differently: Take 1 tablet (12.5 mg total) by mouth as needed (as needed for feet swelling).) 180 tablet 3    montelukast 10 MG Oral Tab Take 1 tablet (10 mg total) by mouth nightly. (Patient taking differently: Take 1 tablet (10 mg total) by mouth once as needed.) 30 tablet 3    Blood Glucose Monitoring Suppl (TRUE METRIX AIR GLUCOSE METER) w/Device Does not apply Kit Use to test Blood sugar daily 1 kit 0    Calcium Carbonate-Vitamin D 600-200 MG-UNIT Oral Cap Take  by mouth. 1 po q12hrs.      aspirin 81 MG Oral Tab EC Take  by mouth. take 1 tablet (81MG)  by ORAL route  every day      Multiple Vitamin (MULTI-DAY) Oral Tab Take  by mouth.      latanoprost (XALATAN) 0.005 % Ophthalmic Solution  (Patient not taking: Reported on 10/10/2024)  3     No current facility-administered medications on file prior to visit.   [2]   Allergies  Allergen Reactions    Latex

## 2025-04-02 ENCOUNTER — TELEPHONE (OUTPATIENT)
Dept: PULMONOLOGY | Facility: CLINIC | Age: 75
End: 2025-04-02

## 2025-04-02 ENCOUNTER — TELEPHONE (OUTPATIENT)
Dept: INTERNAL MEDICINE CLINIC | Facility: CLINIC | Age: 75
End: 2025-04-02

## 2025-04-02 DIAGNOSIS — H40.9 GLAUCOMA, UNSPECIFIED GLAUCOMA TYPE, UNSPECIFIED LATERALITY: Primary | ICD-10-CM

## 2025-04-02 NOTE — TELEPHONE ENCOUNTER
Patient wants to know if she can have CT Scan done sooner than July?  Patient states she is scheduled for CT Scan on 4/11/2025 and was advised by Diagnostics to follow up with Pulmonologist's orders.  Please call.  Thank you.

## 2025-04-03 NOTE — TELEPHONE ENCOUNTER
Patient notified that follow up chest CT should be done 6 months after CT calcium score (end of July). Patient verbalized understanding

## 2025-04-07 NOTE — TELEPHONE ENCOUNTER
Patient called stating her referral hasn't been faxed to . She did not have the fax#. Per the referral in her chart the fax#: is    Fax#: [phone]

## 2025-04-11 ENCOUNTER — OFFICE VISIT (OUTPATIENT)
Facility: CLINIC | Age: 75
End: 2025-04-11
Payer: MEDICARE

## 2025-04-11 VITALS
BODY MASS INDEX: 26 KG/M2 | OXYGEN SATURATION: 95 % | DIASTOLIC BLOOD PRESSURE: 65 MMHG | RESPIRATION RATE: 16 BRPM | SYSTOLIC BLOOD PRESSURE: 123 MMHG | HEART RATE: 90 BPM | WEIGHT: 148 LBS | TEMPERATURE: 97 F

## 2025-04-11 DIAGNOSIS — Z86.000 HISTORY OF DUCTAL CARCINOMA IN SITU (DCIS) OF RIGHT BREAST: ICD-10-CM

## 2025-04-11 DIAGNOSIS — D05.11 DUCTAL CARCINOMA IN SITU (DCIS) OF RIGHT BREAST: Primary | ICD-10-CM

## 2025-04-11 DIAGNOSIS — R92.30 DENSE BREAST TISSUE: ICD-10-CM

## 2025-04-11 PROCEDURE — 99215 OFFICE O/P EST HI 40 MIN: CPT | Performed by: SURGERY

## 2025-04-11 PROCEDURE — 3074F SYST BP LT 130 MM HG: CPT | Performed by: SURGERY

## 2025-04-11 PROCEDURE — 1126F AMNT PAIN NOTED NONE PRSNT: CPT | Performed by: SURGERY

## 2025-04-11 PROCEDURE — 1159F MED LIST DOCD IN RCRD: CPT | Performed by: SURGERY

## 2025-04-11 PROCEDURE — 1160F RVW MEDS BY RX/DR IN RCRD: CPT | Performed by: SURGERY

## 2025-04-11 PROCEDURE — 3078F DIAST BP <80 MM HG: CPT | Performed by: SURGERY

## 2025-04-11 NOTE — PROGRESS NOTES
Breast Surgery Surveillance    History of Present Illness:   Ms. Beatriz Rosario is a 74 year old woman who presents with remote history of right breast DCIS that she said was treated about 20 years ago.  She had a lumpectomy followed by radiation and 5 years of tamoxifen at that time.  She said that over the past few months she has had increased pain and pressure in the right breast.  She had a bilateral diagnostic evaluation in June 2024 at which time she was evaluated for a concern in the left breast with no imaging correlate.  She no longer is following with any additional oncology providers.  She denies any nipple discharge or skin change.  She also has a remote history of a benign left breast needle biopsy and a family history of breast cancer in her sister.  She does not have any known genetic mutation in the family and did not previously undergo genetic testing.  I had the patient obtain an MRI of her breast due to the history and her discomfort and this took place in February 27, 2025.  She was noted to have a 6 mm enhancing mass in the lower outer right breast that was deemed to be indeterminant and a biopsy was recommended.  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  She is here today for evaluation and recommendations for further therapy.        Past Medical History:    Allergic rhinitis    BRCA gene mutation negative in female    Negative 48 gene hereditary cancer panel ([employer] common hereditary cancers + RNA panel), report in media tab    Cataracts, bilateral    Diabetes (HCC)    Diabetes mellitus, type II (HCC)    Ductal carcinoma in situ of right breast    Endometrial thickening on ultra sound    Glaucoma, right eye    Lumbar arthropathy    Osteoarthritis    Other and unspecified hyperlipidemia    Positive PPD    S/P INH prophylaxis for 6 months     Unspecified essential hypertension    Vitamin B12 deficiency anemia    Vitamin D  deficiency       Past Surgical History:   Procedure Laterality Date    Biopsy of breast, needle core Bilateral     Breast biopsy Right     [facility]    Colonoscopy      Colonoscopy  2021    Colonoscopy N/A 2021    Procedure: COLONOSCOPY,;  Surgeon: Dirk Robbins MD;  Location: [facility], [facility]    Hysterectomy  2016    Laparoscopy,vag hysterectomy  2016    w/ BSO, benign    Lumpectomy right  2010    Mri breast biopsy w/ clip 1 site right (cpt=19085)  03/10/2025    Needle biopsy left Left 2011    Radiation right      breast       Gynecological History:  Pt is a   She achieved menarche at age: 14 and LMP 50s  Age of Menopause: 50s  Type: natural menopause  She denies any history of hormone replacement therapy   She denies any history of oral contraceptive use   She denies infertility treatment to achieve pregnancy.    Medications:     methylPREDNISolone 4 MG Oral Tablet Therapy Pack Take as directed with food. 1 each 0    fluticasone-salmeterol 100-50 MCG/ACT Inhalation Aerosol Powder, Breath Activated Inhale 1 puff into the lungs 2 (two) times daily. 60 each 3    albuterol 108 (90 Base) MCG/ACT Inhalation Aero Soln Inhale 1 puff into the lungs every 4 to 6 hours as needed. FOR ASTHMA. Pharmacist, please switch to formulary alternative per insurance coverage, ok to replace with proair, ventolin, proventil, or any other albuterol inhaler. -Dr. Ellison 1 each 11    benzonatate 100 MG Oral Cap Take 1 capsule (100 mg total) by mouth 3 (three) times daily as needed for cough. 30 capsule 0    losartan 50 MG Oral Tab Take 1 tablet (50 mg total) by mouth 2 (two) times daily. 180 tablet 3    amLODIPine 2.5 MG Oral Tab TAKE 1 TABLET BY MOUTH IN THE AM AND 1 TABLET BEFORE BEDTIME. 180 tablet 3    atorvastatin 40 MG Oral Tab Take 1 tablet (40 mg total) by mouth nightly. 90 tablet 3    Glucose Blood (TRUE METRIX BLOOD GLUCOSE TEST) In Vitro Strip 1 strip by In Vitro route  daily. 100 strip 11    Lancets Does not apply Misc Test once daily 100 each 11    hydroCHLOROthiazide 12.5 MG Oral Tab Take 1 tablet (12.5 mg total) by mouth 2 (two) times daily. (Patient taking differently: Take 1 tablet (12.5 mg total) by mouth as needed (as needed for feet swelling).) 180 tablet 3    montelukast 10 MG Oral Tab Take 1 tablet (10 mg total) by mouth nightly. (Patient taking differently: Take 1 tablet (10 mg total) by mouth once as needed.) 30 tablet 3    Blood Glucose Monitoring Suppl (TRUE METRIX AIR GLUCOSE METER) w/Device Does not apply Kit Use to test Blood sugar daily 1 kit 0    Calcium Carbonate-Vitamin D 600-200 MG-UNIT Oral Cap Take by mouth. 1 po q12hrs.      aspirin 81 MG Oral Tab EC Take by mouth. take 1 tablet (81MG)  by ORAL route  every day      Multiple Vitamin (MULTI-DAY) Oral Tab Take by mouth.         Allergies:    Allergies[1]    Family History:   Family History   Problem Relation Age of Onset    Heart Disease Mother 77        CAD    Hypertension Mother         mycardial infarction    Musculo-skelatal Disorder Mother         osteoporosis    Heart Disorder Mother     Stroke Father 84    Hypertension Father 82        myocardial infarction    Heart Disease Father 82        CAD    Heart Disorder Father     Breast Cancer Sister 50        chemo; doing fine    Breast Cancer Maternal Aunt 60 - 69        post,  dt metastases    Breast Cancer Self 60    DCIS Self 60       She is not of Ashkenazi Moravian ancestry.    Social History:  History   Alcohol Use No       History   Smoking Status    Never   Smokeless Tobacco    Never   Ms. Beatriz Rosario is  with 0 children. She has 9 siblings. She is currently Employed part-time      Review of Systems:  General:   The patient denies, fever, chills, night sweats, fatigue, generalized weakness, change in appetite or weight loss.    HEENT:     The patient denies eye irritation, cataracts, redness, glaucoma, yellowing of the eyes, change  in vision, color blindness, or wearing contacts/glasses. The patient denies hearing loss, ringing in the ears, ear drainage, earaches, nasal congestion, nose bleeds, +snoring, pain in mouth/throat, hoarseness, change in voice, facial trauma.    Respiratory:  The patient denies +chronic cough, phlegm, hemoptysis, pleurisy/chest pain, pneumonia, asthma, wheezing, difficulty in breathing with exertion, emphysema, chronic bronchitis, shortness of breath or abnormal sound when breathing.     Cardiovascular:  There is no history of chest pain, chest pressure/discomfort, palpitations, irregular heartbeat, fainting or near-fainting, difficulty breathing when lying flat, SOB/Coughing at night, swelling of the legs or chest pain while walking.    Breasts:  See history of present illness    Gastrointestinal:     There is no history of difficulty or pain with swallowing, +reflux symptoms, vomiting, dark or bloody stools, constipation, yellowing of the skin, indigestion, nausea, change in bowel habits, diarrhea, abdominal pain or vomiting blood.     Genitourinary:  The patient denies frequent urination, needing to get up at night to urinate, urinary hesitancy or retaining urine, painful urination, urinary incontinence, decreased urine stream, blood in the urine or vaginal/penile discharge.    Skin:    The patient denies rash, itching, skin lesions, dry skin, change in skin color or change in moles.     Hematologic/Lymphatic:  The patient denies easily bruising or bleeding or persistent swollen glands or lymph nodes.     Musculoskeletal:  The patient denies muscle aches/pain, +joint pain, +stiff joints, neck pain, back pain or bone pain.    Neuropsychiatric:  There is no history of migraines or severe headaches, seizure/epilepsy, speech problems, coordination problems, trembling/tremors, fainting/black outs, dizziness, memory problems, loss of sensation/numbness, problems walking, weakness, tingling or burning in hands/feet.  There is no history of abusive relationship, bipolar disorder, sleep disturbance, anxiety, depression or feeling of despair.    Endocrine:    There is no history of poor/slow wound healing, weight loss/gain, fertility or hormone problems, cold intolerance, thyroid disease.     Allergic/Immunologic:  There is no history of hives, hay fever, angioedema or anaphylaxis.    /65 (BP Location: Left arm, Patient Position: Sitting, Cuff Size: adult)   Pulse 90   Temp 97 °F (36.1 °C) (Temporal)   Resp 16   Wt 67.1 kg (148 lb)   SpO2 95%   BMI 26.22 kg/m²     Physical Exam:  The patient is an alert, oriented, well-nourished and  well-developed woman who appears her stated age. Her speech patterns and movements are normal. Her affect is appropriate.    HEENT: The head is normocephalic. The neck is supple. The thyroid is not enlarged and is without palpable masses/nodules. There are no palpable masses. The trachea is in the midline. Conjunctiva are clear, non-icteric.    Chest: The chest expands symmetrically. The lungs are clear to auscultation.    Heart: The rhythm is regular.  There are no murmurs, rubs, gallops or thrills.    Breasts:  Her breasts are symmetrical with bra size 40B  Right breast: The skin, nipple ,and areola appear normal. There is no skin dimpling with movement of the pectoralis. There is no nipple retraction. No nipple discharge can be elicited. The parenchyma is mildly nodular. There are no dominant masses in the breast. The axillary tail is normal.  There is a well-healed incision on the inframammary fold with radiation related changes and no palpable concern.  Left breast:   The skin, nipple, and areola appear normal. There is no skin dimpling with movement of the pectoralis. There is no nipple retraction. No nipple discharge can be elicited. The parenchyma is mildly nodular. There are no dominant masses in the breast. The axillary tail is normal.    Abdomen:  The abdomen is soft, flat and  non tender. The liver is not enlarged. There are no palpable masses.    Lymph Nodes:  The supraclavicular, axillary and cervical regions are free of significant lymphadenopathy.    Back: There is no vertebral column tenderness.    Skin: The skin appears normal. There are no suspicious appearing rashes or lesions.    Extremities: The extremities are without deformity, cyanosis or edema.    Impression:   Ms. Beatriz Rosario is a 74 year old woman presents with personal history of right breast DCIS with recent onset right breast pain.    Discussion and Plan:  I had a discussion with the Patient regarding her breast exam. On exam today she is healing well since her recent biopsy with no other clinical findings.  I personally reviewed the recent imaging and pathology we discussed this at length.    The natural history and evolution of DCIS was discussed with Ms. Beatriz Rosario and her family. This  included the difference between in-situ and invasive carcinoma, and the distinction between  local and systemic disease and local and systemic therapy. For local treatment options, I  explained the risks and benefits of breast conservation and mastectomy (with or without  reconstruction), including the fact that survival rates are essentially equal with these two  approaches. If breast conservation is elected, I explained the need for free margins, the  possibility of re-excision to achieve free margins, and the need for post-operative radiotherapy.  The patient was told that bebeto staging is not usually required for DCIS, but is sometimes  recommended depending on the size and grade of the lesion, and if mastectomy is planned  for treatment. The reasons for this were explained. I explained that for pure DCIS, systemic  therapy is not required, although endocrine agents such as tamoxifen can be used in women  with hormone sensitive disease in order to reduce the risk of local recurrence and future new  Primaries.  We  discussed that the standard of care of a new ipsilateral area of DCIS given a history of lumpectomy with prior radiation would be a completion mastectomy.  Patient lives alone and has no means of transportation is very concerned about a lengthy recovery in light of these factors.  She inquired about the possibility of a repeat lumpectomy and her case was discussed at multidisciplinary tumor board.  It was thought that with negative margins the future recurrence risk would be low at approximately 0.5 to 1% risk per year and that a consideration of risk reducing endocrine therapy and or possible repeat radiation course would be reasonable and allow her to have a potentially acceptable low risk of recurrence without a mastectomy.  She is motivated to proceed with a right breast wire localized lumpectomy in light of these findings.  I will have her meet with both medical and radiation oncology postoperatively to discuss risk reducing strategies.  The patient did proceed with genetic testing in March 2025 the results of which were negative.  The risks and possible complications were discussed with the patient at length and she agreed to proceed.  She was given ample opportunity for questions and those questions were answered to her satisfaction. She has been  encouraged to contact the office with any questions or concerns prior to her next appointment.     This note was created by Dragon voice recognition. Errors in content may be related to improper recognition by the system; efforts to review and correct have been done but errors may still exist. Please be advised the primary purpose of this note is for me to communicate medical care. Standard sentence structure is not always used. Medical terminology and medical abbreviations may be used. There may be grammatical, typographical, and automated fill ins that may have errors missed in proofreading.           [1]   Allergies  Allergen Reactions    Latex

## 2025-04-11 NOTE — PATIENT INSTRUCTIONS
Dr. Licha Lind  Tel: [phone]  Fax: [phone] [facility]  [address], [place] [address]  [phone]       Surgery/Procedure: Right breast wire localized lumpectomy      Anesthesia:   MAC  Surgery Length:   1 hour CPT:  66194   Wire LOC:   Yes Nuc Med:   No   Jessica Seed:  No       Dx & ICD-10: Ductal carcinoma in situ (DCIS) of right breast (D05.11)   Radiology Instructions: Right breast, lower outer position , hourglass shaped clip, biopsy confirms ductal carcinoma in situ.   _______________________________________________________________________________    Someone must accompany you the day of the procedure to drive you home safely, because of anesthesia.  You will need an adult  to stay with you the first night following your surgery.  You must remove any kind of makeup, acrylic nails, lotions, powders, creams or deodorant.  [facility] ONLY: Pre-admission will give instruct you on when to take Gatorade and Tylenol/acetaminophen prior to your surgery, purchase 2 - 12oz bottles of regular Gatorade (NOT RED/SUGAR FREE). Otherwise, you may not eat or drink anything else after 11PM the night before surgery.    [facility]: You may not eat or drink anything after midnight the day of your surgery.   Wear comfortable clothing that can be easily removed.  If you wear dentures, contacts lenses, or any prosthesis, you will be asked to remove them.  Do not drink alcohol or smoke 24 hours prior to your procedure.  Bring a picture ID and your insurance card.  Covid-19 testing is no longer required before surgery unless you are experiencing symptoms such as fever, cough, congestion, etc.   The Pre-Admission Testing Department will call the day before to confirm your procedure, give you the time you need to arrive by and directions on where to go. They begin making calls after 2pm, if you are not contacted by 4pm, please call the surgeon's office listed above.  Do not take any blood  thinners at least one week prior to the procedure/surgery. This includes aspirin, baby aspirin, Ibuprofen products, herbal supplements, diet medications, vitamin E, fish oil and green tea supplements. Please check other supplements for these ingredients. *TYLENOL or acetaminophen is acceptable*  If you take Coumadin, Plavix, Xarelto, or Eliquis, please contact your prescribing physician for special instructions on how long to hold. If you take insulin contact your primary care physician for special instructions.  Our surgery scheduler, Jyoti, will be contacting you to discuss surgery dates. If you have any questions related to scheduling your surgery, please reach out to her at [phone].  _____________________________________________________________________  PRE-OPERATIVE TESTING IF INDICATED BELOW  PLEASE COMPLETE ASAP (AT LEAST 14-21 DAYS PRIOR TO SURGERY)  [] CBC [x] BMP [] CMP [x] EKG    [] PT, PTT, INR [] Cardiac Clearance  [x] H&P Medical Clearance [] Chest X-ray     Please call Central Scheduling to schedule an appointment for pre-operative labs/tests once your orders are placed @ [phone]  Does the patient have a pacemaker or ICD?                              Faxitron/Trident in OR?  [] Yes   [x] No                               [] Yes   [x] No

## 2025-04-14 ENCOUNTER — TELEPHONE (OUTPATIENT)
Dept: SURGERY | Facility: CLINIC | Age: 75
End: 2025-04-14

## 2025-04-14 DIAGNOSIS — D05.11 DUCTAL CARCINOMA IN SITU (DCIS) OF RIGHT BREAST: Primary | ICD-10-CM

## 2025-04-14 NOTE — TELEPHONE ENCOUNTER
Calling pt in regards to scheduling surgery.  Informed pt that I have 05/19/2025 available at [facility] with Dr. Lind.  Pt verbalized understanding and in agreement with date and location.  All questions answered.   Encouraged pt to call or [facility] message office with any other questions or concerns.

## 2025-04-15 ENCOUNTER — TELEPHONE (OUTPATIENT)
Dept: GASTROENTEROLOGY | Facility: CLINIC | Age: 75
End: 2025-04-15

## 2025-04-15 ENCOUNTER — OFFICE VISIT (OUTPATIENT)
Dept: GASTROENTEROLOGY | Facility: CLINIC | Age: 75
End: 2025-04-15

## 2025-04-15 VITALS
SYSTOLIC BLOOD PRESSURE: 128 MMHG | DIASTOLIC BLOOD PRESSURE: 60 MMHG | HEIGHT: 63 IN | BODY MASS INDEX: 26.05 KG/M2 | WEIGHT: 147 LBS

## 2025-04-15 DIAGNOSIS — R63.4 WEIGHT LOSS: ICD-10-CM

## 2025-04-15 DIAGNOSIS — R05.9 COUGH, UNSPECIFIED TYPE: ICD-10-CM

## 2025-04-15 DIAGNOSIS — K21.9 GASTROESOPHAGEAL REFLUX DISEASE, UNSPECIFIED WHETHER ESOPHAGITIS PRESENT: Primary | ICD-10-CM

## 2025-04-15 DIAGNOSIS — K86.2 PANCREAS CYST (HCC): ICD-10-CM

## 2025-04-15 DIAGNOSIS — R10.31 RIGHT LOWER QUADRANT ABDOMINAL PAIN: Primary | ICD-10-CM

## 2025-04-15 PROCEDURE — 1159F MED LIST DOCD IN RCRD: CPT | Performed by: STUDENT IN AN ORGANIZED HEALTH CARE EDUCATION/TRAINING PROGRAM

## 2025-04-15 PROCEDURE — 1160F RVW MEDS BY RX/DR IN RCRD: CPT | Performed by: STUDENT IN AN ORGANIZED HEALTH CARE EDUCATION/TRAINING PROGRAM

## 2025-04-15 PROCEDURE — 3008F BODY MASS INDEX DOCD: CPT | Performed by: STUDENT IN AN ORGANIZED HEALTH CARE EDUCATION/TRAINING PROGRAM

## 2025-04-15 PROCEDURE — 3078F DIAST BP <80 MM HG: CPT | Performed by: STUDENT IN AN ORGANIZED HEALTH CARE EDUCATION/TRAINING PROGRAM

## 2025-04-15 PROCEDURE — 3074F SYST BP LT 130 MM HG: CPT | Performed by: STUDENT IN AN ORGANIZED HEALTH CARE EDUCATION/TRAINING PROGRAM

## 2025-04-15 PROCEDURE — 99204 OFFICE O/P NEW MOD 45 MIN: CPT | Performed by: STUDENT IN AN ORGANIZED HEALTH CARE EDUCATION/TRAINING PROGRAM

## 2025-04-15 NOTE — TELEPHONE ENCOUNTER
Scheduled for:  EGD [id number]  Provider Name:  Dr. Sloan  Date:  6/18/2025  Location:  [facility]  Sedation:  MAC  Time:  2:55 pm - Patient is aware he/she will receive a phone call the day before with the arrival time.  Prep:  NPO after midnight  Meds/Allergies Reconciled?:  Physician reviewed  Diagnosis with codes:  GERD K21.9; Cough R05.9  Was patient informed to call insurance with codes (Y/N):  Yes  Referral sent?:  Referral was sent at the time of electronic surgical scheduling.  EM or [facility] notified?:  I sent an electronic request to Endo Scheduling and received a confirmation today.    Medication Orders:  N/A  Misc Orders:  N/A     Further instructions given by staff:  Prep instructions were given to pt in the office, pt verbalized understanding.

## 2025-04-15 NOTE — PATIENT INSTRUCTIONS
1) Schedule CT scan of the abdomen/pelvis  2) Schedule upper endoscopy (EGD) with MAC [Diagnosis: GERD, Cough]  3) If you start any NEW medication after your visit today, please notify us. Certain medications will need to be held before the procedure, or the procedure cannot be performed.   4) Use Famotidine 20mg twice per day  5) MRI MRCP this year for cyst surveillance

## 2025-04-15 NOTE — H&P
[facility] - Gastroenterology                                                                                                               Reason for consult: ***    Requesting physician or provider: Zack Ellison MD    Chief Complaint   Patient presents with    Consult     Referred for pancreatic cyst       HPI:   Beatriz Rosario is a 74 year old year-old man***woman with history of ***          Prior endoscopies:  ***    Soc:  -*** smoking  -*** Etoh    Wt Readings from Last 6 Encounters:   04/15/25 147 lb (66.7 kg)   04/11/25 148 lb (67.1 kg)   03/28/25 147 lb (66.7 kg)   02/25/25 148 lb (67.1 kg)   11/27/24 147 lb (66.7 kg)   11/25/24 149 lb (67.6 kg)        History, Medications, Allergies, ROS:      Past Medical History[1]   Past Surgical History[2]   Family Hx: Family History[3]   Social History: Short Social Hx on File[4]     Medications (Active prior to today's visit):  Current Medications[5]    Allergies:  Allergies[6]    ROS:   CONSTITUTIONAL:  negative for fevers, rigors  EYES:  negative for diplopia   RESPIRATORY:  negative for severe shortness of breath  CARDIOVASCULAR:  negative for crushing sub-sternal chest pain  GASTROINTESTINAL:  see HPI  GENITOURINARY:  negative for dysuria or gross hematuria  INTEGUMENT/BREAST:  SKIN:  negative for jaundice   ALLERGIC/IMMUNOLOGIC:  negative for hay fever  ENDOCRINE:  negative for cold intolerance and heat intolerance  MUSCULOSKELETAL:  negative for joint effusion/severe erythema  BEHAVIOR/PSYCH:  negative for psychotic behavior      PHYSICAL EXAM:   Blood pressure 128/60, height 5' 3\" (1.6 m), weight 147 lb (66.7 kg).    Gen- Patient appears comfortable and in no acute discomfort  HEENT: the sclera appears anicteric, oropharynx clear, mucus membranes appear moist  CV- regular rate and rhythm, the extremities are warm and well perfused   Lung- Moves air well; No labored  breathing  Abdomen- soft, non-tender exam in all quadrants without rigidity or guarding, non-distended  Skin- No jaundice  Ext: no edema is evident.   Neuro- Alert and interactive, and gross movements of extremities normal  Psych - appropriate, non-agitated    Labs/Imaging:     Patient's pertinent labs and imaging were reviewed and discussed with patient today.      .  ASSESSMENT/PLAN:   ***    #***    Recommendations:  -***      Copy of this note sent to  ***.     Orders This Visit:  No orders of the defined types were placed in this encounter.      Meds This Visit:  Requested Prescriptions      No prescriptions requested or ordered in this encounter       Imaging & Referrals:  None         Marcos Sloan MD  [facility] [address] [facility]astroenterology  4/15/2025      This note was partially prepared using Dragon Medical voice recognition dictation software. As a result, errors may occur. When identified, these errors have been corrected. While every attempt is made to correct errors during dictation, discrepancies may still exist.        [1]   Past Medical History:   Allergic rhinitis    BRCA gene mutation negative in female    Negative 48 gene hereditary cancer panel ([employer] common hereditary cancers + RNA panel), report in media tab    Cataracts, bilateral    Diabetes (HCC)    Diabetes mellitus, type II (HCC)    Ductal carcinoma in situ of right breast    Endometrial thickening on ultra sound    Glaucoma, right eye    Lumbar arthropathy    Osteoarthritis    Other and unspecified hyperlipidemia    Positive PPD    S/P INH prophylaxis for 6 months     Unspecified essential hypertension    Vitamin B12 deficiency anemia    Vitamin D deficiency   [2]   Past Surgical History:  Procedure Laterality Date    Biopsy of breast, needle core Bilateral 2011    Breast biopsy Right 2001    [facility]    Colonoscopy  2008    Colonoscopy  06/2021    Colonoscopy N/A 06/25/2021    Procedure: COLONOSCOPY,;  Surgeon: Dirk Robbins MD;   Location: [facility], [facility]    Hysterectomy  2016    Laparoscopy,vag hysterectomy  2016    w/ BSO, benign    Lumpectomy right  2010    Mri breast biopsy w/ clip 1 site right (cpt=19085)  03/10/2025    Needle biopsy left Left 2011    Radiation right  2011    breast   [3]   Family History  Problem Relation Age of Onset    Heart Disease Mother 77        CAD    Hypertension Mother         mycardial infarction    Musculo-skelatal Disorder Mother         osteoporosis    Heart Disorder Mother     Stroke Father 84    Hypertension Father 82        myocardial infarction    Heart Disease Father 82        CAD    Heart Disorder Father     Breast Cancer Sister 50        chemo; doing fine    Breast Cancer Maternal Aunt 60 - 69        post,  dt metastases    Breast Cancer Self 60    DCIS Self 60   [4]   Social History  Socioeconomic History    Marital status:     Number of children: 0   Occupational History    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     Employer: [employer]   Tobacco Use    Smoking status: Never     Passive exposure: Never    Smokeless tobacco: Never   Vaping Use    Vaping status: Never Used   Substance and Sexual Activity    Alcohol use: No     Alcohol/week: 0.0 standard drinks of alcohol    Drug use: No   Other Topics Concern    Caffeine Concern Yes     Comment: 2 cups of coffee per day      Social Drivers of Health      [facility]   [5]   Current Outpatient Medications   Medication Sig Dispense Refill    methylPREDNISolone 4 MG Oral Tablet Therapy Pack Take as directed with food. (Patient not taking: Reported on 4/15/2025) 1 each 0    fluticasone-salmeterol 100-50 MCG/ACT Inhalation Aerosol Powder, Breath Activated Inhale 1 puff into the lungs 2 (two) times daily. 60 each 3    albuterol 108 (90 Base) MCG/ACT Inhalation Aero Soln Inhale 1 puff into the lungs every 4 to 6 hours as needed. FOR ASTHMA. Pharmacist, please  switch to formulary alternative per insurance coverage, ok to replace with proair, ventolin, proventil, or any other albuterol inhaler. -Dr. Ellison 1 each 11    benzonatate 100 MG Oral Cap Take 1 capsule (100 mg total) by mouth 3 (three) times daily as needed for cough. 30 capsule 0    losartan 50 MG Oral Tab Take 1 tablet (50 mg total) by mouth 2 (two) times daily. 180 tablet 3    amLODIPine 2.5 MG Oral Tab TAKE 1 TABLET BY MOUTH IN THE AM AND 1 TABLET BEFORE BEDTIME. 180 tablet 3    atorvastatin 40 MG Oral Tab Take 1 tablet (40 mg total) by mouth nightly. 90 tablet 3    Glucose Blood (TRUE METRIX BLOOD GLUCOSE TEST) In Vitro Strip 1 strip by In Vitro route daily. 100 strip 11    Lancets Does not apply Misc Test once daily 100 each 11    hydroCHLOROthiazide 12.5 MG Oral Tab Take 1 tablet (12.5 mg total) by mouth 2 (two) times daily. 180 tablet 3    montelukast 10 MG Oral Tab Take 1 tablet (10 mg total) by mouth nightly. 30 tablet 3    Blood Glucose Monitoring Suppl (TRUE METRIX AIR GLUCOSE METER) w/Device Does not apply Kit Use to test Blood sugar daily 1 kit 0    Calcium Carbonate-Vitamin D 600-200 MG-UNIT Oral Cap Take by mouth. 1 po q12hrs.      aspirin 81 MG Oral Tab EC Take by mouth. take 1 tablet (81MG)  by ORAL route  every day      Multiple Vitamin (MULTI-DAY) Oral Tab Take by mouth.     [6]   Allergies  Allergen Reactions    Latex

## 2025-04-15 NOTE — H&P
[facility] - Gastroenterology                                                                                                               Reason for consult: pancreatic cyst, gerd, cough    Requesting physician or provider: Zack Ellison MD    Chief Complaint   Patient presents with    Consult     Referred for pancreatic cyst       HPI:   Beatriz Rosario is a 74 year old year-old woman with history of pancreas cyst who presents to GI clinic to establish care.    Patient previously followed with [facility] gastro[facility]ology for management of pancreatic cyst and colon cancer screening.    The patient had a CT scan of the abdomen/pelvis done previously for kidney stones and incidentally found was a pancreatic body cyst most consistent with a branch duct IPMN.    Patient reports having a colonoscopy in 2021 and she was told to follow-up in 10 years.    She denies abdominal discomfort.  Denies nausea, vomiting.  Denies dysphagia.  She does occasionally have heartburn but her main complaint is a cough.  She does not like taking PPI therapy and therefore has been taking famotidine.  She reports that her symptoms of heartburn are minimal but she is mainly bothered by the cough.  She had a laryngoscope done which was notable for some erythema but otherwise unremarkable.    MRI LIVER 1/2022  FINDINGS:    LIVER:  In the dome of the liver there is a 8 mm cyst which is T2 hyperintense and no evidence of enhancement.   BILIARY:  No visible dilatation or filling defect.    PANCREAS:  Within the pancreatic body there is a posterior nonenhancing T2 hyperintense cyst measuring 7 x 5 mm in size which was not definitively seen on previous studies but likely represents a benign lesion such as intraductal papillary mucinous     MRI MRCP 5/2023  CONCLUSION:      1.3 cm cystic lesion pancreatic body which appears to communicate with the nondilated  main pancreatic duct not significantly changed since the prior examination.  Additional subcentimeter cyst in the pancreatic head is also stable.  These may reflect   dilated side branch radicles or small cystic neoplasm specifically side branch intraductal papillary mucinous neoplasm.  Recommend follow-up MRCP in 12 months.     MRI MRCP 5/2024  Impression   CONCLUSION: Stable 1.3 x 1.1 centimeter simple appearing side-branch IPMN in the proximal pancreatic body.     Prior endoscopies:  Per patient -- colonoscopy 2021 - negative    Soc:  -no smoking  -no Etoh    Wt Readings from Last 6 Encounters:   04/15/25 147 lb (66.7 kg)   04/11/25 148 lb (67.1 kg)   03/28/25 147 lb (66.7 kg)   02/25/25 148 lb (67.1 kg)   11/27/24 147 lb (66.7 kg)   11/25/24 149 lb (67.6 kg)        History, Medications, Allergies, ROS:      Past Medical History[1]   Past Surgical History[2]   Family Hx: Family History[3]   Social History: Short Social Hx on File[4]     Medications (Active prior to today's visit):  Current Medications[5]    Allergies:  Allergies[6]    ROS:   CONSTITUTIONAL:  negative for fevers, rigors  EYES:  negative for diplopia   RESPIRATORY:  negative for severe shortness of breath  CARDIOVASCULAR:  negative for crushing sub-sternal chest pain  GASTROINTESTINAL:  see HPI  GENITOURINARY:  negative for dysuria or gross hematuria  INTEGUMENT/BREAST:  SKIN:  negative for jaundice   ALLERGIC/IMMUNOLOGIC:  negative for hay fever  ENDOCRINE:  negative for cold intolerance and heat intolerance  MUSCULOSKELETAL:  negative for joint effusion/severe erythema  BEHAVIOR/PSYCH:  negative for psychotic behavior      PHYSICAL EXAM:   Blood pressure 128/60, height 5' 3\" (1.6 m), weight 147 lb (66.7 kg).    Gen- Patient appears comfortable and in no acute discomfort  HEENT: the sclera appears anicteric, oropharynx clear, mucus membranes appear moist  CV- regular rate and rhythm, the extremities are warm and well perfused   Lung- Moves air  well; No labored breathing  Abdomen- soft, non-tender exam in all quadrants without rigidity or guarding, non-distended  Skin- No jaundice  Ext: no edema is evident.   Neuro- Alert and interactive, and gross movements of extremities normal  Psych - appropriate, non-agitated    Labs/Imaging:     Patient's pertinent labs and imaging were reviewed and discussed with patient today.      ASSESSMENT/PLAN:   Beatriz Rosario is a 74 year old year-old woman with history of pancreas cyst who presents to GI clinic to establish care.    #Colon cancer screening  -Last colonoscopy 2021 per patient, no polyps. Was told she would not need another colonoscopy given age.    #Pancreatic cyst  -Most recent MRI 5/2024 with a 1.3 cm x 1.1 cm thin walled simple cyst in the proximal pancreatic body communicating with the underlying main duct. This is most likely a side-branch IPMN.  -No significant change in appearance from 2022 to 2024.  -No solid component to lesion, no pancreatic ductal dilation, cyst is < 3 cm in size.  -Discussed the low risk for malignant progression though should continue to monitor.    #GERD  #Cough  -Possible that cough is related to GERD though she does not have significant heartburn or food regurgitation.  -NO dysphagia.   -Taking PRN H2 blockers with essentially no symptoms of GERD.  -No prior EGD.  -Will evaluate with EGD to assess esophagus.    #Right lower quadrant discomfort  -Chronic, unclear etiology.  -Large stool burden seen on imaging 2019.  -Does not really complain of significant constipation.  -Given chronicity of symptoms and age of 74, will image.    Recommendations:  1) Schedule CT scan of the abdomen/pelvis  2) Schedule upper endoscopy (EGD) with MAC [Diagnosis: GERD, Cough]  3) If you start any NEW medication after your visit today, please notify us. Certain medications will need to be held before the procedure, or the procedure cannot be performed.   4) Use Famotidine 20mg twice per day  5)  MRI MRCP this year for cyst surveillance    Orders This Visit:  No orders of the defined types were placed in this encounter.      Meds This Visit:  Requested Prescriptions      No prescriptions requested or ordered in this encounter       Imaging & Referrals:  CT ABDOMEN+PELVIS(CONTRAST ONLY)(GVU=[id number])         Marcos Sloan MD  [facility] Gastroenterology  4/15/2025      This note was partially prepared using Dragon Medical voice recognition dictation software. As a result, errors may occur. When identified, these errors have been corrected. While every attempt is made to correct errors during dictation, discrepancies may still exist.          [1]   Past Medical History:   Allergic rhinitis    BRCA gene mutation negative in female    Negative 48 gene hereditary cancer panel ([employer] common hereditary cancers + RNA panel), report in media tab    Cataracts, bilateral    Diabetes (HCC)    Diabetes mellitus, type II (HCC)    Ductal carcinoma in situ of right breast    Endometrial thickening on ultra sound    Glaucoma, right eye    Lumbar arthropathy    Osteoarthritis    Other and unspecified hyperlipidemia    Positive PPD    S/P INH prophylaxis for 6 months     Unspecified essential hypertension    Vitamin B12 deficiency anemia    Vitamin D deficiency   [2]   Past Surgical History:  Procedure Laterality Date    Biopsy of breast, needle core Bilateral 2011    Breast biopsy Right 2001    [facility]    Colonoscopy  2008    Colonoscopy  06/2021    Colonoscopy N/A 06/25/2021    Procedure: COLONOSCOPY,;  Surgeon: Dirk Robbins MD;  [facility]: [facility]    Hysterectomy  2016    Laparoscopy,vag hysterectomy  09/26/2016    w/ BSO, benign    Lumpectomy right  08/06/2010    Mri breast biopsy w/ clip 1 site right (cpt=19085)  03/10/2025    Needle biopsy left Left 08/12/2011    Radiation right  2011    breast   [3]   Family History  Problem Relation Age of Onset    Heart Disease Mother 77        CAD     Hypertension Mother         mycardial infarction    Musculo-skelatal Disorder Mother         osteoporosis    Heart Disorder Mother     Stroke Father 84    Hypertension Father 82        myocardial infarction    Heart Disease Father 82        CAD    Heart Disorder Father     Breast Cancer Sister 50        chemo; doing fine    Breast Cancer Maternal Aunt 60 - 69        post,  dt metastases    Breast Cancer Self 59    DCIS Self 59   [4]   Social History  Socioeconomic History    Marital status:     Number of children: 0   Occupational History    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     Employer: [employer]   Tobacco Use    Smoking status: Never     Passive exposure: Never    Smokeless tobacco: Never   Vaping Use    Vaping status: Never Used   Substance and Sexual Activity    Alcohol use: No     Alcohol/week: 0.0 standard drinks of alcohol    Drug use: No   Other Topics Concern    Caffeine Concern Yes     Comment: 2 cups of coffee per day      Social Drivers of Health      Received from [facility]   [5]   Current Outpatient Medications   Medication Sig Dispense Refill    fluticasone-salmeterol 100-50 MCG/ACT Inhalation Aerosol Powder, Breath Activated Inhale 1 puff into the lungs 2 (two) times daily. 60 each 3    albuterol 108 (90 Base) MCG/ACT Inhalation Aero Soln Inhale 1 puff into the lungs every 4 to 6 hours as needed. FOR ASTHMA. Pharmacist, please switch to formulary alternative per insurance coverage, ok to replace with proair, ventolin, proventil, or any other albuterol inhaler. -Dr. Ellison 1 each 11    benzonatate 100 MG Oral Cap Take 1 capsule (100 mg total) by mouth 3 (three) times daily as needed for cough. 30 capsule 0    losartan 50 MG Oral Tab Take 1 tablet (50 mg total) by mouth 2 (two) times daily. 180 tablet 3    amLODIPine 2.5 MG Oral Tab TAKE 1 TABLET BY MOUTH IN THE AM AND 1 TABLET BEFORE BEDTIME. 180 tablet 3    atorvastatin 40 MG Oral Tab  Take 1 tablet (40 mg total) by mouth nightly. 90 tablet 3    Glucose Blood (TRUE METRIX BLOOD GLUCOSE TEST) In Vitro Strip 1 strip by In Vitro route daily. 100 strip 11    Lancets Does not apply Misc Test once daily 100 each 11    hydroCHLOROthiazide 12.5 MG Oral Tab Take 1 tablet (12.5 mg total) by mouth 2 (two) times daily. 180 tablet 3    montelukast 10 MG Oral Tab Take 1 tablet (10 mg total) by mouth nightly. 30 tablet 3    Blood Glucose Monitoring Suppl (TRUE METRIX AIR GLUCOSE METER) w/Device Does not apply Kit Use to test Blood sugar daily 1 kit 0    Calcium Carbonate-Vitamin D 600-200 MG-UNIT Oral Cap Take by mouth. 1 po q12hrs.      aspirin 81 MG Oral Tab EC Take by mouth. take 1 tablet (81MG)  by ORAL route  every day      Multiple Vitamin (MULTI-DAY) Oral Tab Take by mouth.      methylPREDNISolone 4 MG Oral Tablet Therapy Pack Take as directed with food. (Patient not taking: Reported on 4/15/2025) 1 each 0   [6]   Allergies  Allergen Reactions    Latex

## 2025-04-26 ENCOUNTER — OFFICE VISIT (OUTPATIENT)
Dept: INTERNAL MEDICINE CLINIC | Facility: CLINIC | Age: 75
End: 2025-04-26

## 2025-04-26 VITALS
HEART RATE: 76 BPM | BODY MASS INDEX: 26.05 KG/M2 | DIASTOLIC BLOOD PRESSURE: 62 MMHG | WEIGHT: 147 LBS | SYSTOLIC BLOOD PRESSURE: 110 MMHG | HEIGHT: 63 IN

## 2025-04-26 DIAGNOSIS — E78.5 HYPERLIPIDEMIA ASSOCIATED WITH TYPE 2 DIABETES MELLITUS (HCC): ICD-10-CM

## 2025-04-26 DIAGNOSIS — E11.9 TYPE 2 DIABETES MELLITUS WITHOUT COMPLICATION, WITHOUT LONG-TERM CURRENT USE OF INSULIN (HCC): Primary | ICD-10-CM

## 2025-04-26 DIAGNOSIS — R91.8 LUNG NODULES: ICD-10-CM

## 2025-04-26 DIAGNOSIS — E11.59 HYPERTENSION ASSOCIATED WITH TYPE 2 DIABETES MELLITUS (HCC): ICD-10-CM

## 2025-04-26 DIAGNOSIS — E11.69 HYPERLIPIDEMIA ASSOCIATED WITH TYPE 2 DIABETES MELLITUS (HCC): ICD-10-CM

## 2025-04-26 DIAGNOSIS — J30.89 ENVIRONMENTAL AND SEASONAL ALLERGIES: ICD-10-CM

## 2025-04-26 DIAGNOSIS — Z86.000 HISTORY OF DUCTAL CARCINOMA IN SITU (DCIS) OF RIGHT BREAST: ICD-10-CM

## 2025-04-26 DIAGNOSIS — I15.2 HYPERTENSION ASSOCIATED WITH TYPE 2 DIABETES MELLITUS (HCC): ICD-10-CM

## 2025-04-26 DIAGNOSIS — E55.9 VITAMIN D DEFICIENCY: ICD-10-CM

## 2025-04-26 LAB
CARTRIDGE EXPIRATION DATE: ABNORMAL DATE
HEMOGLOBIN A1C: 6.1 % (ref 4.3–5.6)

## 2025-04-26 RX ORDER — MONTELUKAST SODIUM 10 MG/1
10 TABLET ORAL NIGHTLY
Qty: 30 TABLET | Refills: 3 | Status: SHIPPED | OUTPATIENT
Start: 2025-04-26

## 2025-04-26 RX ORDER — LOSARTAN POTASSIUM 50 MG/1
50 TABLET ORAL 2 TIMES DAILY
Qty: 180 TABLET | Refills: 3 | Status: SHIPPED | OUTPATIENT
Start: 2025-04-26

## 2025-04-26 RX ORDER — ATORVASTATIN CALCIUM 40 MG/1
40 TABLET, FILM COATED ORAL NIGHTLY
Qty: 90 TABLET | Refills: 3 | Status: SHIPPED | OUTPATIENT
Start: 2025-04-26

## 2025-04-26 RX ORDER — AMLODIPINE BESYLATE 2.5 MG/1
TABLET ORAL
Qty: 180 TABLET | Refills: 3 | Status: SHIPPED | OUTPATIENT
Start: 2025-04-26

## 2025-04-26 NOTE — PROGRESS NOTES
OFFICE NOTE       The following individual(s) verbally consented to be recorded using ambient AI listening technology and understand that they can each withdraw their consent to this listening technology at any point by asking the clinician to turn off or pause the recording:    Patient name: Beatriz Rosario  Additional names:            Patient ID: Beatriz Rosario is a 74 year old female.  Today's Date: 04/26/25  Chief Complaint: Diabetes      History of Present Illness  Beatriz Rosario is a 74 year old female with type two diabetes mellitus who presents for diabetes follow up.    Her type two diabetes mellitus is well-controlled with an A1c of 6.1, and she wants to lower it below 6. She is not experiencing nausea, vomiting, fatigue, or weakness. Her current medications include amlodipine 2.5 mg twice daily, losartan, montelukast, atorvastatin, calcium, and vitamin D supplements. She has stopped using albuterol as her cough has improved and is not taking hydrochlorothiazide due to cramps, with no current leg swelling.    She has a history of hypertension, hyperlipidemia, atherosclerosis, pancreatic cysts, osteoporosis, vitamin D deficiency, and right breast ductal carcinoma in situ (DCIS). She underwent a lumpectomy, and her condition is stable on recent MRI and MRCP.    She has a history of pulmonary nodules and is concerned about them. A CT chest is planned to monitor these nodules.    She has a history of throat irritation and has been evaluated by an ENT. Currently, she reports no issues with her throat.    Her kidney function is reported as good with a GFR of 93 and no protein or albumin in the urine. She is concerned about her kidney health due to past back pain, which has since resolved.       Vitals:    04/26/25 1042   BP: 110/62   Pulse: 76   Weight: 147 lb (66.7 kg)   Height: 5' 3\" (1.6 m)     body mass index is 26.04 kg/m².  BP Readings from Last 3 Encounters:   04/26/25 110/62   04/15/25  128/60   04/11/25 123/65     The 10-year ASCVD risk score (Diego LUCIO, et al., 2019) is: 22.1%    Values used to calculate the score:      Age: 74 years      Sex: Female      Is Non- : No      Diabetic: Yes      Tobacco smoker: No      Systolic Blood Pressure: 110 mmHg      Is BP treated: Yes      HDL Cholesterol: 55 mg/dL      Total Cholesterol: 133 mg/dL  Results  LABS  A1c: 6.1 (03/2025)  GFR: 93 (01/2025)  Creatinine: 0.66 (01/2025)  Microalbumin creatinine ratio: <0.3 (01/2025)  Vitamin D: 24 (01/2025)  TSH: Normal (01/2025)  Ferritin: Normal (01/2025)  Cholesterol: Normal (01/2025)  Triglycerides: High (01/2025)    RADIOLOGY  MRI: Stable  MRCP: Stable    PATHOLOGY  Right breast biopsy: DCIS (03/10/2025)       Medications reviewed:  Current Medications[1]      Assessment & Plan    1. Type 2 diabetes mellitus without complication, without long-term current use of insulin (HCC) (Primary)  -     POC Hemoglobin A1C  -     Comp Metabolic Panel (14); Future; Expected date: 04/26/2025  2. Hypertension associated with type 2 diabetes mellitus (HCC)  -     amLODIPine Besylate; TAKE 1 TABLET BY MOUTH IN THE AM AND 1 TABLET BEFORE BEDTIME.  Dispense: 180 tablet; Refill: 3  -     Losartan Potassium; Take 1 tablet (50 mg total) by mouth 2 (two) times daily.  Dispense: 180 tablet; Refill: 3  -     Comp Metabolic Panel (14); Future; Expected date: 04/26/2025  3. Hyperlipidemia associated with type 2 diabetes mellitus (HCC)  -     Atorvastatin Calcium; Take 1 tablet (40 mg total) by mouth nightly.  Dispense: 90 tablet; Refill: 3  -     Lipid Panel; Future; Expected date: 04/26/2025  4. Environmental and seasonal allergies  -     Montelukast Sodium; Take 1 tablet (10 mg total) by mouth nightly.  Dispense: 30 tablet; Refill: 3  5. Lung nodules  6. History of ductal carcinoma in situ (DCIS) of right breast  -     CBC With Differential With Platelet; Future; Expected date: 04/26/2025  7. Vitamin D  deficiency  Other orders  -     Vitamin D3; Take 1 capsule (5,000 Units total) by mouth daily.  Dispense: 90 capsule; Refill: 3    Assessment & Plan  Pulmonary nodules  Pulmonary nodules under surveillance. CT chest ordered for further evaluation. Coordination with radiology for lung assessment in upcoming CT abdomen and pelvis planned.  - Confirm CT chest order with Dr. Candido Prado.  - Coordinate with radiology to include lung assessment in upcoming CT abdomen and pelvis.    Right breast DCIS, status post lumpectomy  Right breast DCIS post-lumpectomy. Managed with recent MRI and MRCP. Wire localized lumpectomy scheduled for May 19, 2025.  - Proceed with scheduled lumpectomy on May 19, 2025.  - Coordinate follow-up with surgical team post-lumpectomy.    Hypertension  Hypertension well-controlled. Blood pressure stable at 110 mmHg. Hydrochlorothiazide discontinued due to cramps.  - Continue amlodipine 2.5 mg oral bid.  - Continue losartan 50 mg oral bid.  - Discontinue hydrochlorothiazide.  - Monitor blood pressure regularly.  - Increase losartan if blood pressure rises.    Type 2 diabetes mellitus without complications  Type 2 diabetes well-controlled with A1c of 6.1%.  - Continue current diabetes management.  - Order A1c test every 3 months.  - Review blood glucose levels regularly.    Hyperlipidemia  Hyperlipidemia managed with atorvastatin. Triglycerides slightly elevated but improved.  - Continue atorvastatin 40 mg oral nightly.  - Monitor lipid panel regularly.    Age-related osteoporosis without current pathological fracture  Osteoporosis managed with calcium and vitamin D. Bisphosphonates declined.  - Continue calcium carbonate-vitamin D 600-200 mg-unit oral q12hrs.  - Increase vitamin D to 5000 IU daily due to insufficiency.    Vitamin D deficiency  Vitamin D deficiency persists. Current level 24, indicating insufficiency.  - Increase vitamin D supplementation to 5000 IU daily.  - Finish current bottle of  2000 IU before increasing dose.    Throat irritation  Throat irritation managed by ENT specialist Dr. Frederick Ybarra.  - Continue follow-up with ENT specialist as needed.       Follow Up: As needed/if symptoms worsen or Return in about 3 months (around 7/16/2025) for diabetes follow up and review 6 month labs..      There is a longitudinal care relationship with me, the care plan reflects the ongoing nature of the continuous relationship of care, and the medical record indicates that there is ongoing treatment of a serious/complex medical condition which I am currently managing.  is Applicable.     Objective/ Results:   Physical Exam  Constitutional:       Appearance: She is well-developed.   Cardiovascular:      Rate and Rhythm: Normal rate and regular rhythm.      Heart sounds: Normal heart sounds.   Pulmonary:      Effort: Pulmonary effort is normal.      Breath sounds: Normal breath sounds.   Abdominal:      General: Bowel sounds are normal.      Palpations: Abdomen is soft.   Skin:     General: Skin is warm and dry.   Neurological:      Mental Status: She is alert and oriented to person, place, and time.      Deep Tendon Reflexes: Reflexes are normal and symmetric.        Physical Exam       Reviewed:    Patient Active Problem List    Diagnosis    Lung nodules    Ductal carcinoma in situ (DCIS) of right breast    BRCA gene mutation negative in female     Negative 48 gene hereditary cancer panel ([employer] common hereditary cancers + RNA panel), report in media tab      PAD (peripheral artery disease)    History of ductal carcinoma in situ (DCIS) of right breast    Chronic cough    Age-related osteoporosis without current pathological fracture    Pancreatic cyst (HCC)    Medicare annual wellness visit, subsequent    Hyperlipidemia associated with type 2 diabetes mellitus (HCC)    Colon cancer screening    Atherosclerosis of aorta     Ct abd/pelvis 8/2015      Glaucoma    Type 2 diabetes mellitus without  complication, without long-term current use of insulin (HCC)    Vitamin D deficiency    Allergic rhinitis    Hypertension associated with type 2 diabetes mellitus (HCC)      Allergies[2]   Short Social Hx on File[3]   Review of Systems   Constitutional: Negative.    Respiratory: Negative.     Cardiovascular: Negative.    Gastrointestinal: Negative.    Skin: Negative.    Neurological: Negative.        All other systems negative unless otherwise stated in ROS or HPI above.       Zack Ellison MD  Internal Medicine       Call office with any questions or seek emergency care if necessary.   Patient understands and agrees to follow directions and advice.      ----------------------------------------- PATIENT INSTRUCTIONS-----------------------------------------     There are no Patient Instructions on file for this visit.        The 21st Century Cures Act makes medical notes available to patients in the interest of transparency.  However, please be advised that this is a medical document.  It is intended as a peer to peer communication.  It is written in medical language and may contain abbreviations or verbiage that are technical and unfamiliar.  It may appear blunt or direct.  Medical documents are intended to carry relevant information, facts as evident, and the clinical opinion of the practitioner.          [1]   Current Outpatient Medications   Medication Sig Dispense Refill    amLODIPine 2.5 MG Oral Tab TAKE 1 TABLET BY MOUTH IN THE AM AND 1 TABLET BEFORE BEDTIME. 180 tablet 3    atorvastatin 40 MG Oral Tab Take 1 tablet (40 mg total) by mouth nightly. 90 tablet 3    montelukast 10 MG Oral Tab Take 1 tablet (10 mg total) by mouth nightly. 30 tablet 3    losartan 50 MG Oral Tab Take 1 tablet (50 mg total) by mouth 2 (two) times daily. 180 tablet 3    cholecalciferol (VITAMIN D3) 125 MCG (5000 UT) Oral Cap Take 1 capsule (5,000 Units total) by mouth daily. 90 capsule 3    albuterol 108 (90 Base) MCG/ACT Inhalation  Aero Soln Inhale 1 puff into the lungs every 4 to 6 hours as needed. FOR ASTHMA. Pharmacist, please switch to formulary alternative per insurance coverage, ok to replace with proair, ventolin, proventil, or any other albuterol inhaler. -Dr. Ellison 1 each 11    Glucose Blood (TRUE METRIX BLOOD GLUCOSE TEST) In Vitro Strip 1 strip by In Vitro route daily. 100 strip 11    Lancets Does not apply Misc Test once daily 100 each 11    Blood Glucose Monitoring Suppl (TRUE METRIX AIR GLUCOSE METER) w/Device Does not apply Kit Use to test Blood sugar daily 1 kit 0    Calcium Carbonate-Vitamin D 600-200 MG-UNIT Oral Cap Take by mouth. 1 po q12hrs.      aspirin 81 MG Oral Tab EC Take by mouth. take 1 tablet (81MG)  by ORAL route  every day      Multiple Vitamin (MULTI-DAY) Oral Tab Take by mouth.     [2]   Allergies  Allergen Reactions    Latex    [3]   Social History  Socioeconomic History    Marital status:     Number of children: 0   Occupational History    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     Employer: [employer]   Tobacco Use    Smoking status: Never     Passive exposure: Never    Smokeless tobacco: Never   Vaping Use    Vaping status: Never Used   Substance and Sexual Activity    Alcohol use: No     Alcohol/week: 0.0 standard drinks of alcohol    Drug use: No   Other Topics Concern    Caffeine Concern Yes     Comment: 2 cups of coffee per day      Social Drivers of Health      [facility]

## 2025-05-01 ENCOUNTER — HOSPITAL ENCOUNTER (OUTPATIENT)
Dept: CT IMAGING | Facility: HOSPITAL | Age: 75
Discharge: HOME | End: 2025-05-01
Attending: STUDENT IN AN ORGANIZED HEALTH CARE EDUCATION/TRAINING PROGRAM
Payer: MEDICARE

## 2025-05-01 DIAGNOSIS — R10.31 RIGHT LOWER QUADRANT ABDOMINAL PAIN: ICD-10-CM

## 2025-05-01 DIAGNOSIS — R63.4 WEIGHT LOSS: ICD-10-CM

## 2025-05-01 PROCEDURE — 74177 CT ABD & PELVIS W/CONTRAST: CPT | Performed by: STUDENT IN AN ORGANIZED HEALTH CARE EDUCATION/TRAINING PROGRAM

## 2025-05-08 ENCOUNTER — TELEPHONE (OUTPATIENT)
Dept: CASE MANAGEMENT | Age: 75
End: 2025-05-08

## 2025-05-08 DIAGNOSIS — Z01.810 PRE-OPERATIVE CARDIOVASCULAR EXAMINATION: ICD-10-CM

## 2025-05-08 DIAGNOSIS — R06.02 SHORTNESS OF BREATH: Primary | ICD-10-CM

## 2025-05-08 NOTE — LETTER
As above      February 11, 2019        [address] [facility]      Dear Adrian Isbell: We have attempted to contact you by phone with no success.   Cliff Small has asked me to contact you because she feels you would bene

## 2025-05-12 ENCOUNTER — TELEPHONE (OUTPATIENT)
Age: 75
End: 2025-05-12

## 2025-05-12 DIAGNOSIS — D05.11 DUCTAL CARCINOMA IN SITU (DCIS) OF RIGHT BREAST: Primary | ICD-10-CM

## 2025-05-14 ENCOUNTER — TELEPHONE (OUTPATIENT)
Dept: INTERNAL MEDICINE CLINIC | Facility: CLINIC | Age: 75
End: 2025-05-14

## 2025-05-14 ENCOUNTER — LAB ENCOUNTER (OUTPATIENT)
Dept: LAB | Age: 75
End: 2025-05-14
Attending: STUDENT IN AN ORGANIZED HEALTH CARE EDUCATION/TRAINING PROGRAM
Payer: MEDICARE

## 2025-05-14 ENCOUNTER — OFFICE VISIT (OUTPATIENT)
Dept: INTERNAL MEDICINE CLINIC | Facility: CLINIC | Age: 75
End: 2025-05-14
Payer: MEDICARE

## 2025-05-14 ENCOUNTER — HOSPITAL ENCOUNTER (OUTPATIENT)
Dept: GENERAL RADIOLOGY | Age: 75
Discharge: HOME | End: 2025-05-14
Attending: STUDENT IN AN ORGANIZED HEALTH CARE EDUCATION/TRAINING PROGRAM
Payer: MEDICARE

## 2025-05-14 VITALS — DIASTOLIC BLOOD PRESSURE: 67 MMHG | SYSTOLIC BLOOD PRESSURE: 132 MMHG | HEART RATE: 85 BPM

## 2025-05-14 DIAGNOSIS — I15.2 HYPERTENSION ASSOCIATED WITH TYPE 2 DIABETES MELLITUS (HCC): ICD-10-CM

## 2025-05-14 DIAGNOSIS — Z01.818 PRE-OP EXAM: ICD-10-CM

## 2025-05-14 DIAGNOSIS — R05.3 CHRONIC COUGH: ICD-10-CM

## 2025-05-14 DIAGNOSIS — J30.89 ENVIRONMENTAL AND SEASONAL ALLERGIES: ICD-10-CM

## 2025-05-14 DIAGNOSIS — T14.8XXA BRUISING: ICD-10-CM

## 2025-05-14 DIAGNOSIS — E78.5 HYPERLIPIDEMIA ASSOCIATED WITH TYPE 2 DIABETES MELLITUS (HCC): ICD-10-CM

## 2025-05-14 DIAGNOSIS — E11.59 HYPERTENSION ASSOCIATED WITH TYPE 2 DIABETES MELLITUS (HCC): ICD-10-CM

## 2025-05-14 DIAGNOSIS — Z01.818 PRE-OP EXAM: Primary | ICD-10-CM

## 2025-05-14 DIAGNOSIS — E11.69 HYPERLIPIDEMIA ASSOCIATED WITH TYPE 2 DIABETES MELLITUS (HCC): ICD-10-CM

## 2025-05-14 LAB
ALBUMIN SERPL-MCNC: 4.7 G/DL (ref 3.2–4.8)
ALBUMIN/GLOB SERPL: 2.2 {RATIO} (ref 1–2)
ALP LIVER SERPL-CCNC: 86 U/L (ref 55–142)
ALT SERPL-CCNC: 19 U/L (ref 10–49)
ANION GAP SERPL CALC-SCNC: 7 MMOL/L (ref 0–18)
APTT PPP: 30 SECONDS (ref 23–36)
AST SERPL-CCNC: 21 U/L (ref ?–34)
ATRIAL RATE: 77 BPM
BASOPHILS # BLD AUTO: 0.05 X10(3) UL (ref 0–0.2)
BASOPHILS NFR BLD AUTO: 0.6 %
BILIRUB SERPL-MCNC: 0.4 MG/DL (ref 0.2–1.1)
BILIRUB UR QL: NEGATIVE
BUN BLD-MCNC: 18 MG/DL (ref 9–23)
BUN/CREAT SERPL: 18.6 (ref 10–20)
CALCIUM BLD-MCNC: 9.2 MG/DL (ref 8.7–10.4)
CHLORIDE SERPL-SCNC: 103 MMOL/L (ref 98–112)
CLARITY UR: CLEAR
CO2 SERPL-SCNC: 29 MMOL/L (ref 21–32)
COLOR UR: COLORLESS
CREAT BLD-MCNC: 0.97 MG/DL (ref 0.55–1.02)
DEPRECATED RDW RBC AUTO: 51.6 FL (ref 35.1–46.3)
EGFRCR SERPLBLD CKD-EPI 2021: 62 ML/MIN/1.73M2 (ref 60–?)
EOSINOPHIL # BLD AUTO: 0.25 X10(3) UL (ref 0–0.7)
EOSINOPHIL NFR BLD AUTO: 3.2 %
ERYTHROCYTE [DISTWIDTH] IN BLOOD BY AUTOMATED COUNT: 15 % (ref 11–15)
FASTING STATUS PATIENT QL REPORTED: NO
GLOBULIN PLAS-MCNC: 2.1 G/DL (ref 2–3.5)
GLUCOSE BLD-MCNC: 92 MG/DL (ref 70–99)
GLUCOSE UR-MCNC: NORMAL MG/DL
HCT VFR BLD AUTO: 36.4 % (ref 35–48)
HGB BLD-MCNC: 11.7 G/DL (ref 12–16)
HGB UR QL STRIP.AUTO: NEGATIVE
IMM GRANULOCYTES # BLD AUTO: 0.01 X10(3) UL (ref 0–1)
IMM GRANULOCYTES NFR BLD: 0.1 %
INR BLD: 0.87 (ref 0.8–1.2)
KETONES UR-MCNC: NEGATIVE MG/DL
LEUKOCYTE ESTERASE UR QL STRIP.AUTO: NEGATIVE
LYMPHOCYTES # BLD AUTO: 3.11 X10(3) UL (ref 1–4)
LYMPHOCYTES NFR BLD AUTO: 39.6 %
MCH RBC QN AUTO: 30 PG (ref 26–34)
MCHC RBC AUTO-ENTMCNC: 32.1 G/DL (ref 31–37)
MCV RBC AUTO: 93.3 FL (ref 80–100)
MONOCYTES # BLD AUTO: 0.7 X10(3) UL (ref 0.1–1)
MONOCYTES NFR BLD AUTO: 8.9 %
NEUTROPHILS # BLD AUTO: 3.74 X10 (3) UL (ref 1.5–7.7)
NEUTROPHILS # BLD AUTO: 3.74 X10(3) UL (ref 1.5–7.7)
NEUTROPHILS NFR BLD AUTO: 47.6 %
NITRITE UR QL STRIP.AUTO: NEGATIVE
OSMOLALITY SERPL CALC.SUM OF ELEC: 290 MOSM/KG (ref 275–295)
P AXIS: 58 DEGREES
P-R INTERVAL: 144 MS
PH UR: 6 [PH] (ref 5–8)
PLATELET # BLD AUTO: 332 10(3)UL (ref 150–450)
POTASSIUM SERPL-SCNC: 4 MMOL/L (ref 3.5–5.1)
PROT SERPL-MCNC: 6.8 G/DL (ref 5.7–8.2)
PROT UR-MCNC: NEGATIVE MG/DL
PROTHROMBIN TIME: 12.4 SECONDS (ref 11.6–14.8)
Q-T INTERVAL: 406 MS
QRS DURATION: 78 MS
QTC CALCULATION (BEZET): 459 MS
R AXIS: 62 DEGREES
RBC # BLD AUTO: 3.9 X10(6)UL (ref 3.8–5.3)
SODIUM SERPL-SCNC: 139 MMOL/L (ref 136–145)
SP GR UR STRIP: 1 (ref 1–1.03)
T AXIS: 67 DEGREES
UROBILINOGEN UR STRIP-ACNC: NORMAL
VENTRICULAR RATE: 77 BPM
WBC # BLD AUTO: 7.9 X10(3) UL (ref 4–11)

## 2025-05-14 PROCEDURE — 99215 OFFICE O/P EST HI 40 MIN: CPT | Performed by: STUDENT IN AN ORGANIZED HEALTH CARE EDUCATION/TRAINING PROGRAM

## 2025-05-14 PROCEDURE — 71046 X-RAY EXAM CHEST 2 VIEWS: CPT | Performed by: STUDENT IN AN ORGANIZED HEALTH CARE EDUCATION/TRAINING PROGRAM

## 2025-05-14 PROCEDURE — 36415 COLL VENOUS BLD VENIPUNCTURE: CPT

## 2025-05-14 PROCEDURE — 93005 ELECTROCARDIOGRAM TRACING: CPT

## 2025-05-14 PROCEDURE — 3075F SYST BP GE 130 - 139MM HG: CPT | Performed by: STUDENT IN AN ORGANIZED HEALTH CARE EDUCATION/TRAINING PROGRAM

## 2025-05-14 PROCEDURE — 80053 COMPREHEN METABOLIC PANEL: CPT

## 2025-05-14 PROCEDURE — 3078F DIAST BP <80 MM HG: CPT | Performed by: STUDENT IN AN ORGANIZED HEALTH CARE EDUCATION/TRAINING PROGRAM

## 2025-05-14 PROCEDURE — 81003 URINALYSIS AUTO W/O SCOPE: CPT

## 2025-05-14 PROCEDURE — 1159F MED LIST DOCD IN RCRD: CPT | Performed by: STUDENT IN AN ORGANIZED HEALTH CARE EDUCATION/TRAINING PROGRAM

## 2025-05-14 PROCEDURE — 85730 THROMBOPLASTIN TIME PARTIAL: CPT

## 2025-05-14 PROCEDURE — 87081 CULTURE SCREEN ONLY: CPT

## 2025-05-14 PROCEDURE — 93010 ELECTROCARDIOGRAM REPORT: CPT | Performed by: INTERNAL MEDICINE

## 2025-05-14 PROCEDURE — 85610 PROTHROMBIN TIME: CPT

## 2025-05-14 PROCEDURE — 1160F RVW MEDS BY RX/DR IN RCRD: CPT | Performed by: STUDENT IN AN ORGANIZED HEALTH CARE EDUCATION/TRAINING PROGRAM

## 2025-05-14 PROCEDURE — 85025 COMPLETE CBC W/AUTO DIFF WBC: CPT

## 2025-05-14 RX ORDER — ALBUTEROL SULFATE 90 UG/1
1 INHALANT RESPIRATORY (INHALATION)
Qty: 1 EACH | Refills: 11 | Status: SHIPPED | OUTPATIENT
Start: 2025-05-14

## 2025-05-14 RX ORDER — ATORVASTATIN CALCIUM 40 MG/1
40 TABLET, FILM COATED ORAL NIGHTLY
Qty: 90 TABLET | Refills: 3 | Status: SHIPPED | OUTPATIENT
Start: 2025-05-14

## 2025-05-14 RX ORDER — MONTELUKAST SODIUM 10 MG/1
10 TABLET ORAL NIGHTLY
Qty: 30 TABLET | Refills: 3 | Status: SHIPPED | OUTPATIENT
Start: 2025-05-14

## 2025-05-14 RX ORDER — LOSARTAN POTASSIUM 50 MG/1
50 TABLET ORAL 2 TIMES DAILY
Qty: 180 TABLET | Refills: 3 | Status: SHIPPED | OUTPATIENT
Start: 2025-05-14

## 2025-05-14 RX ORDER — AMLODIPINE BESYLATE 2.5 MG/1
TABLET ORAL
Qty: 180 TABLET | Refills: 3 | Status: SHIPPED | OUTPATIENT
Start: 2025-05-14

## 2025-05-14 NOTE — PROGRESS NOTES
OFFICE NOTE       The following individual(s) verbally consented to be recorded using ambient AI listening technology and understand that they can each withdraw their consent to this listening technology at any point by asking the clinician to turn off or pause the recording:    Patient name: Beatriz Rosario  Additional names:            Patient ID: Beatriz Rosario is a 74 year old female.  Today's Date: 05/14/25  Chief Complaint: Pre-Op Exam (Sx: 5/19/25 Right breast wire localized lumpectomy with Licha Arnold MD)      History of Present Illness  Beatriz Rosario is a 74 year old female with prediabetes and hypertension who presents for a preoperative examination for a right breast lumpectomy.    She is scheduled for a right breast lumpectomy with wire-guided biopsy and is here for a preoperative examination to ensure she is healthy enough for anesthesia and surgery.    Her diabetes is relatively well controlled with a recent HbA1c of 6.1, and she is not on any diabetes medications. She is currently taking amlodipine, atorvastatin, losartan, montelukast, and over-the-counter calcium and vitamin D. She stopped taking aspirin seven days prior to the visit.    No neck pain, range of motion abnormalities, or history of neck surgeries. No history of heart disease, heart failure, heart attack, strokes, chest pain, or arrhythmias in the last ninety days. She denies smoking, sleep apnea, or taking any pulmonary medications. She is able to lay flat without difficulty and can climb two flights of stairs without issues, as she lives on the third floor without an elevator.    She had a diabetic eye exam earlier this year, around March, and is awaiting the results to be sent to the current provider. She is not in need of any medication refills at this time.                       Beatriz Rosario presents for preoperative clearance for: Right breast wire localized lumpectomy  Performing Physician:   Licha Lind,  Date of surgery: 5/19/2025  Elective or emergency: elective   Pre-operative forms provided: tbd    Current complaints, if any:       Active medical problems:   Problem List[1]      Cervical/neck:   - Arthritis: No  - Neck pain: No  - Difficulty with ROM: No  - Prior neck injury/surgery: No    Cardiac:  - History of ischemic coronary disease: No  - History of heart failure: No  - Heart attack in past 90 days: No  - Chest pain in last 90 days: No  - History of Arrhythmia:  No  - History of stroke or TIA:   (in past 6-9months?)No  - Diabetes/A1C: Last A1c value was 6.1% done 4/26/2025.      - Anticoagulation/Warfarin/ASA/NOAC: Yes; comment: stop 7 days before      Pulmonary:   - Smoker: No  - Apnea/CPAP: No  - Asthma/COPD:No  - Difficulty laying flat for extended period of time: No  - Dyspnea/exertional: No  - Respiratory Medications: No    Functional Capacity:   Perform ADLs- eating, dress, toilet (1 METs)? Yes, patient can perform.   Walk up flight of steps, hill, walk ground level 3-4mph (4 METs)? Yes, patient can perform.   Perform heavy housework- scrubbing floors, move heavy furniture, climb 2 flights of stairs (4-10 METs)? Yes; comment: 2 lfights of stairs  Participate in strenuous sports- swimming, singles tennis, football, basketball, ski (+10 METs)?  No        #Revised Cardiac Risk Index   0 points  RCRI Score  3.9 %  Risk of major cardiac event    Dr Lind to proceed with surgical intervention if benefits outweigh risks at time of procedure. Patient instructed to follow all pre and post surgical advice. Surgeon and anesthesiology physicians to make final decision at time of procedure based upon patients clinical status.         Vitals:    05/14/25 1339   BP: 132/67   Pulse: 85     body mass index is unknown because there is no height or weight on file.  BP Readings from Last 3 Encounters:   05/14/25 132/67   04/26/25 110/62   04/15/25 128/60     The 10-year ASCVD risk score (Diego LUCIO,  et al., 2019) is: 30.3%    Values used to calculate the score:      Age: 74 years      Sex: Female      Is Non- : No      Diabetic: Yes      Tobacco smoker: No      Systolic Blood Pressure: 132 mmHg      Is BP treated: Yes      HDL Cholesterol: 55 mg/dL      Total Cholesterol: 133 mg/dL  Results  LABS  HbA1c: 6.1%       Medications reviewed:  Current Medications[2]      Assessment & Plan    1. Pre-op exam (Primary)  -     Comp Metabolic Panel (14); Future; Expected date: 05/14/2025  -     CBC With Differential With Platelet; Future; Expected date: 05/14/2025  -     Urinalysis with Culture Reflex; Future; Expected date: 05/14/2025  -     Prothrombin Time (PT); Future; Expected date: 05/14/2025  -     PTT, Activated; Future; Expected date: 05/14/2025  -     MSSA and MRSA Culture Screen; Future; Expected date: 05/14/2025  -     XR CHEST PA + LAT CHEST (CPT=71046); Future; Expected date: 05/14/2025  -     EKG 12 Lead; Future; Expected date: 05/14/2025  2. Bruising  -     Prothrombin Time (PT); Future; Expected date: 05/14/2025  -     PTT, Activated; Future; Expected date: 05/14/2025  3. Hypertension associated with type 2 diabetes mellitus (HCC)  -     amLODIPine Besylate; TAKE 1 TABLET BY MOUTH IN THE AM AND 1 TABLET BEFORE BEDTIME.  Dispense: 180 tablet; Refill: 3  -     Losartan Potassium; Take 1 tablet (50 mg total) by mouth 2 (two) times daily.  Dispense: 180 tablet; Refill: 3  4. Chronic cough  -     Albuterol Sulfate HFA; Inhale 1 puff into the lungs every 4 to 6 hours as needed. FOR ASTHMA. Pharmacist, please switch to formulary alternative per insurance coverage, ok to replace with proair, ventolin, proventil, or any other albuterol inhaler. -Dr. Ellison  Dispense: 1 each; Refill: 11  5. Hyperlipidemia associated with type 2 diabetes mellitus (HCC)  -     Atorvastatin Calcium; Take 1 tablet (40 mg total) by mouth nightly.  Dispense: 90 tablet; Refill: 3  6. Environmental and seasonal  allergies  -     Montelukast Sodium; Take 1 tablet (10 mg total) by mouth nightly.  Dispense: 30 tablet; Refill: 3    Assessment & Plan  Preoperative examination for right breast lumpectomy  Preoperative evaluation for right breast lumpectomy with wire-guided biopsy. No contraindications identified. Diabetes well controlled. No smoking history or sleep apnea. Aspirin stopped seven days prior.  - Order CBC, CMP, urinalysis, PT, PTT, MSSA, MRSA swab, chest x-ray, and EKG.  - Restart aspirin the day after surgery.  - Send preoperative clearance to surgeon within one to two days after receiving lab results.    Type 2 diabetes mellitus without complications  Type 2 diabetes mellitus is well controlled with an A1c of 6.1. No diabetes medications are currently being used.  - Request diabetic eye exam results from Dr. Issac Mock.    Hypertension  Hypertension is managed with amlodipine and losartan. Blood pressure control appears adequate.    Allergic rhinitis  Allergic rhinitis is managed with montelukast. Symptoms are reportedly severe.  - Ensure montelukast prescription is up to date.  Pre op labs ordered: CBC, CMP, PT/PTT, MSSA/MRSA swab, CXR and EKG     #Revised Cardiac Risk Index   0 points  RCRI Score  3.9 %  Risk of major cardiac event    Dr Lind to proceed with surgical intervention if benefits outweigh risks at time of procedure. Patient instructed to follow all pre and post surgical advice. Surgeon and anesthesiology physicians to make final decision at time of procedure based upon patients clinical status.       Follow Up: As needed/if symptoms worsen or Return in about 3 months (around 8/14/2025)..     I spent 50 minutes obtaining pertitent medical history, reviewing pertinent imaging/labs and specialists notes, evaluating patient, discussing differential diagnosis' and various treatment options, reinforcing importance of compliance with treatment plan, and completing documentation.     Encounter  Times  PreChartin minutes    Reviewing/Obtainin minutes      Medical Exam: 4 minutes    Plan: 5 minutes      Notes: 4 minutes    Counseling/Education: 6 minutes      Referring/Communicatin minutes    Ind Interpretation:   minutes      Care Coordination: 2 minutes       My total time spent caring for the patient on the day of the encounter: 50 minutes.       Objective/ Results:   Physical Exam  Constitutional:       Appearance: She is well-developed.   Cardiovascular:      Rate and Rhythm: Normal rate and regular rhythm.      Heart sounds: Normal heart sounds.   Pulmonary:      Effort: Pulmonary effort is normal.      Breath sounds: Normal breath sounds.   Abdominal:      General: Bowel sounds are normal.      Palpations: Abdomen is soft.   Skin:     General: Skin is warm and dry.   Neurological:      Mental Status: She is alert and oriented to person, place, and time.      Deep Tendon Reflexes: Reflexes are normal and symmetric.        Physical Exam  CHEST: Lungs clear to auscultation.  CARDIOVASCULAR: Heart sounds normal.     Reviewed:    Patient Active Problem List    Diagnosis    Lung nodules    Ductal carcinoma in situ (DCIS) of right breast    BRCA gene mutation negative in female     Negative 48 gene hereditary cancer panel ([employer] common hereditary cancers + RNA panel), report in media tab      PAD (peripheral artery disease)    History of ductal carcinoma in situ (DCIS) of right breast    Chronic cough    Age-related osteoporosis without current pathological fracture    Pancreatic cyst (HCC)    Medicare annual wellness visit, subsequent    Hyperlipidemia associated with type 2 diabetes mellitus (HCC)    Colon cancer screening    Atherosclerosis of aorta     Ct abd/pelvis 2015      Glaucoma    Type 2 diabetes mellitus without complication, without long-term current use of insulin (HCC)    Vitamin D deficiency    Allergic rhinitis    Hypertension associated with type 2 diabetes mellitus (HCC)       Allergies[3]   Short Social Hx on File[4]   Review of Systems   Constitutional: Negative.    Respiratory: Negative.     Cardiovascular: Negative.    Gastrointestinal: Negative.    Skin: Negative.    Neurological: Negative.        All other systems negative unless otherwise stated in ROS or HPI above.       Zack Ellison MD  Internal Medicine       Call office with any questions or seek emergency care if necessary.   Patient understands and agrees to follow directions and advice.      ----------------------------------------- PATIENT INSTRUCTIONS-----------------------------------------     There are no Patient Instructions on file for this visit.        The 21st Century Cures Act makes medical notes available to patients in the interest of transparency.  However, please be advised that this is a medical document.  It is intended as a peer to peer communication.  It is written in medical language and may contain abbreviations or verbiage that are technical and unfamiliar.  It may appear blunt or direct.  Medical documents are intended to carry relevant information, facts as evident, and the clinical opinion of the practitioner.          [1]   Patient Active Problem List  Diagnosis    Allergic rhinitis    Hypertension associated with type 2 diabetes mellitus (HCC)    Type 2 diabetes mellitus without complication, without long-term current use of insulin (HCC)    Vitamin D deficiency    Glaucoma    Atherosclerosis of aorta    Colon cancer screening    Hyperlipidemia associated with type 2 diabetes mellitus (HCC)    Chronic cough    Age-related osteoporosis without current pathological fracture    Pancreatic cyst (HCC)    Medicare annual wellness visit, subsequent    History of ductal carcinoma in situ (DCIS) of right breast    PAD (peripheral artery disease)    BRCA gene mutation negative in female    Ductal carcinoma in situ (DCIS) of right breast    Lung nodules   [2]   Current Outpatient Medications   Medication  Sig Dispense Refill    amLODIPine 2.5 MG Oral Tab TAKE 1 TABLET BY MOUTH IN THE AM AND 1 TABLET BEFORE BEDTIME. 180 tablet 3    albuterol 108 (90 Base) MCG/ACT Inhalation Aero Soln Inhale 1 puff into the lungs every 4 to 6 hours as needed. FOR ASTHMA. Pharmacist, please switch to formulary alternative per insurance coverage, ok to replace with proair, ventolin, proventil, or any other albuterol inhaler. -Dr. Ellison 1 each 11    atorvastatin 40 MG Oral Tab Take 1 tablet (40 mg total) by mouth nightly. 90 tablet 3    losartan 50 MG Oral Tab Take 1 tablet (50 mg total) by mouth 2 (two) times daily. 180 tablet 3    montelukast 10 MG Oral Tab Take 1 tablet (10 mg total) by mouth nightly. 30 tablet 3    cholecalciferol (VITAMIN D3) 125 MCG (5000 UT) Oral Cap Take 1 capsule (5,000 Units total) by mouth daily. 90 capsule 3    Glucose Blood (TRUE METRIX BLOOD GLUCOSE TEST) In Vitro Strip 1 strip by In Vitro route daily. 100 strip 11    Lancets Does not apply Misc Test once daily 100 each 11    Blood Glucose Monitoring Suppl (TRUE METRIX AIR GLUCOSE METER) w/Device Does not apply Kit Use to test Blood sugar daily 1 kit 0    Calcium Carbonate-Vitamin D 600-200 MG-UNIT Oral Cap Take by mouth. 1 po q12hrs.      aspirin 81 MG Oral Tab EC Take by mouth. take 1 tablet (81MG)  by ORAL route  every day      Multiple Vitamin (MULTI-DAY) Oral Tab Take by mouth.     [3]   Allergies  Allergen Reactions    Latex    [4]   Social History  Socioeconomic History    Marital status:     Number of children: 0   Occupational History    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     Employer: [employer]   Tobacco Use    Smoking status: Never     Passive exposure: Never    Smokeless tobacco: Never   Vaping Use    Vaping status: Never Used   Substance and Sexual Activity    Alcohol use: No     Alcohol/week: 0.0 standard drinks of alcohol    Drug use: No   Other Topics Concern    Caffeine Concern Yes     Comment: 2  cups of coffee per day      Social Drivers of Health      Received from [employer]    [facility]

## 2025-05-15 ENCOUNTER — TELEPHONE (OUTPATIENT)
Facility: CLINIC | Age: 75
End: 2025-05-15

## 2025-05-15 NOTE — TELEPHONE ENCOUNTER
1st,Overdue reminder letter sent out via My chart for the following:  MRI MRCP (W+WO) (CPT=74183) (Order #[id number]) on 4/15/25

## 2025-05-18 DIAGNOSIS — M79.671 BILATERAL LEG AND FOOT PAIN: ICD-10-CM

## 2025-05-18 DIAGNOSIS — M79.672 BILATERAL LEG AND FOOT PAIN: ICD-10-CM

## 2025-05-18 DIAGNOSIS — K21.00 GASTROESOPHAGEAL REFLUX DISEASE WITH ESOPHAGITIS WITHOUT HEMORRHAGE: ICD-10-CM

## 2025-05-18 DIAGNOSIS — M79.605 BILATERAL LEG AND FOOT PAIN: ICD-10-CM

## 2025-05-18 DIAGNOSIS — M79.604 BILATERAL LEG AND FOOT PAIN: ICD-10-CM

## 2025-05-18 NOTE — TELEPHONE ENCOUNTER
Pharmacy is requesting a prescription refill.     GABAPENTIN 10MG CAPSULES  QTY: 270  TAKE 1 CAPSULE BY MOUTH THREE TIMES DAILY   LAST REFILL 10/10/24

## 2025-05-18 NOTE — TELEPHONE ENCOUNTER
Pharmacy is requesting a prescription refill.     OMEPRAZOLE 40MG CAPSULES  TAKE 1 CAPSULE BY MOUTH DAILY  QTY: 90

## 2025-05-19 ENCOUNTER — APPOINTMENT (OUTPATIENT)
Dept: MAMMOGRAPHY | Facility: HOSPITAL | Age: 75
End: 2025-05-19
Attending: SURGERY
Payer: MEDICARE

## 2025-05-19 ENCOUNTER — HOSPITAL ENCOUNTER (OUTPATIENT)
Facility: HOSPITAL | Age: 75
Setting detail: HOSPITAL OUTPATIENT SURGERY
Discharge: HOME | End: 2025-05-19
Attending: SURGERY | Admitting: SURGERY
Payer: MEDICARE

## 2025-05-19 ENCOUNTER — ANESTHESIA EVENT (OUTPATIENT)
Dept: SURGERY | Facility: HOSPITAL | Age: 75
End: 2025-05-19
Payer: MEDICARE

## 2025-05-19 ENCOUNTER — HOSPITAL ENCOUNTER (OUTPATIENT)
Dept: MAMMOGRAPHY | Facility: HOSPITAL | Age: 75
Discharge: HOME | End: 2025-05-19
Attending: SURGERY
Payer: MEDICARE

## 2025-05-19 ENCOUNTER — ANESTHESIA (OUTPATIENT)
Dept: SURGERY | Facility: HOSPITAL | Age: 75
End: 2025-05-19
Payer: MEDICARE

## 2025-05-19 VITALS
DIASTOLIC BLOOD PRESSURE: 60 MMHG | SYSTOLIC BLOOD PRESSURE: 119 MMHG | HEIGHT: 63 IN | RESPIRATION RATE: 15 BRPM | WEIGHT: 148 LBS | TEMPERATURE: 97 F | OXYGEN SATURATION: 97 % | BODY MASS INDEX: 26.22 KG/M2 | HEART RATE: 73 BPM

## 2025-05-19 DIAGNOSIS — D05.11 DUCTAL CARCINOMA IN SITU (DCIS) OF RIGHT BREAST: Primary | ICD-10-CM

## 2025-05-19 DIAGNOSIS — D05.11 DUCTAL CARCINOMA IN SITU (DCIS) OF RIGHT BREAST: ICD-10-CM

## 2025-05-19 LAB
GLUCOSE BLDC GLUCOMTR-MCNC: 106 MG/DL (ref 70–99)
GLUCOSE BLDC GLUCOMTR-MCNC: 114 MG/DL (ref 70–99)

## 2025-05-19 PROCEDURE — 82962 GLUCOSE BLOOD TEST: CPT

## 2025-05-19 PROCEDURE — 19281 PERQ DEVICE BREAST 1ST IMAG: CPT | Performed by: SURGERY

## 2025-05-19 PROCEDURE — 76098 X-RAY EXAM SURGICAL SPECIMEN: CPT | Performed by: SURGERY

## 2025-05-19 PROCEDURE — 88307 TISSUE EXAM BY PATHOLOGIST: CPT | Performed by: SURGERY

## 2025-05-19 RX ORDER — LIDOCAINE HYDROCHLORIDE 10 MG/ML
INJECTION, SOLUTION EPIDURAL; INFILTRATION; INTRACAUDAL; PERINEURAL AS NEEDED
Status: DISCONTINUED | OUTPATIENT
Start: 2025-05-19 | End: 2025-05-19 | Stop reason: SURG

## 2025-05-19 RX ORDER — NALOXONE HYDROCHLORIDE 0.4 MG/ML
80 INJECTION, SOLUTION INTRAMUSCULAR; INTRAVENOUS; SUBCUTANEOUS AS NEEDED
Status: DISCONTINUED | OUTPATIENT
Start: 2025-05-19 | End: 2025-05-19

## 2025-05-19 RX ORDER — MORPHINE SULFATE 4 MG/ML
2 INJECTION, SOLUTION INTRAMUSCULAR; INTRAVENOUS EVERY 10 MIN PRN
Status: DISCONTINUED | OUTPATIENT
Start: 2025-05-19 | End: 2025-05-19

## 2025-05-19 RX ORDER — BUPIVACAINE HYDROCHLORIDE 5 MG/ML
INJECTION, SOLUTION EPIDURAL; INTRACAUDAL; PERINEURAL AS NEEDED
Status: DISCONTINUED | OUTPATIENT
Start: 2025-05-19 | End: 2025-05-19 | Stop reason: HOSPADM

## 2025-05-19 RX ORDER — HYDROMORPHONE HYDROCHLORIDE 1 MG/ML
0.2 INJECTION, SOLUTION INTRAMUSCULAR; INTRAVENOUS; SUBCUTANEOUS EVERY 5 MIN PRN
Status: DISCONTINUED | OUTPATIENT
Start: 2025-05-19 | End: 2025-05-19

## 2025-05-19 RX ORDER — HYDROMORPHONE HYDROCHLORIDE 1 MG/ML
0.4 INJECTION, SOLUTION INTRAMUSCULAR; INTRAVENOUS; SUBCUTANEOUS EVERY 5 MIN PRN
Status: DISCONTINUED | OUTPATIENT
Start: 2025-05-19 | End: 2025-05-19

## 2025-05-19 RX ORDER — GLYCOPYRROLATE 0.2 MG/ML
INJECTION, SOLUTION INTRAMUSCULAR; INTRAVENOUS AS NEEDED
Status: DISCONTINUED | OUTPATIENT
Start: 2025-05-19 | End: 2025-05-19 | Stop reason: SURG

## 2025-05-19 RX ORDER — MORPHINE SULFATE 4 MG/ML
4 INJECTION, SOLUTION INTRAMUSCULAR; INTRAVENOUS EVERY 10 MIN PRN
Status: DISCONTINUED | OUTPATIENT
Start: 2025-05-19 | End: 2025-05-19

## 2025-05-19 RX ORDER — NICOTINE POLACRILEX 4 MG
15 LOZENGE BUCCAL
Status: DISCONTINUED | OUTPATIENT
Start: 2025-05-19 | End: 2025-05-19

## 2025-05-19 RX ORDER — HYDROMORPHONE HYDROCHLORIDE 1 MG/ML
0.6 INJECTION, SOLUTION INTRAMUSCULAR; INTRAVENOUS; SUBCUTANEOUS EVERY 5 MIN PRN
Status: DISCONTINUED | OUTPATIENT
Start: 2025-05-19 | End: 2025-05-19

## 2025-05-19 RX ORDER — MORPHINE SULFATE 10 MG/ML
6 INJECTION, SOLUTION INTRAMUSCULAR; INTRAVENOUS EVERY 10 MIN PRN
Status: DISCONTINUED | OUTPATIENT
Start: 2025-05-19 | End: 2025-05-19

## 2025-05-19 RX ORDER — HYDROCODONE BITARTRATE AND ACETAMINOPHEN 5; 325 MG/1; MG/1
1-2 TABLET ORAL EVERY 6 HOURS PRN
Qty: 20 TABLET | Refills: 0 | Status: SHIPPED | OUTPATIENT
Start: 2025-05-19 | End: 2025-05-28 | Stop reason: ALTCHOICE

## 2025-05-19 RX ORDER — SODIUM CHLORIDE, SODIUM LACTATE, POTASSIUM CHLORIDE, CALCIUM CHLORIDE 600; 310; 30; 20 MG/100ML; MG/100ML; MG/100ML; MG/100ML
INJECTION, SOLUTION INTRAVENOUS CONTINUOUS
Status: DISCONTINUED | OUTPATIENT
Start: 2025-05-19 | End: 2025-05-19

## 2025-05-19 RX ORDER — ONDANSETRON 2 MG/ML
INJECTION INTRAMUSCULAR; INTRAVENOUS AS NEEDED
Status: DISCONTINUED | OUTPATIENT
Start: 2025-05-19 | End: 2025-05-19 | Stop reason: SURG

## 2025-05-19 RX ORDER — ACETAMINOPHEN 500 MG
1000 TABLET ORAL ONCE
Status: COMPLETED | OUTPATIENT
Start: 2025-05-19 | End: 2025-05-19

## 2025-05-19 RX ORDER — NICOTINE POLACRILEX 4 MG
30 LOZENGE BUCCAL
Status: DISCONTINUED | OUTPATIENT
Start: 2025-05-19 | End: 2025-05-19

## 2025-05-19 RX ORDER — DEXTROSE MONOHYDRATE 25 G/50ML
50 INJECTION, SOLUTION INTRAVENOUS
Status: DISCONTINUED | OUTPATIENT
Start: 2025-05-19 | End: 2025-05-19

## 2025-05-19 RX ADMIN — GLYCOPYRROLATE 0.2 MG: 0.2 INJECTION, SOLUTION INTRAMUSCULAR; INTRAVENOUS at 11:01:00

## 2025-05-19 RX ADMIN — LIDOCAINE HYDROCHLORIDE 50 MG: 10 INJECTION, SOLUTION EPIDURAL; INFILTRATION; INTRACAUDAL; PERINEURAL at 10:57:00

## 2025-05-19 RX ADMIN — ONDANSETRON 4 MG: 2 INJECTION INTRAMUSCULAR; INTRAVENOUS at 10:58:00

## 2025-05-19 NOTE — ANESTHESIA PREPROCEDURE EVALUATION
Anesthesia PreOp Note    HPI:     Beatriz Rosario is a 74 year old female who presents for preoperative consultation requested by: Licha Lind MD    Date of Surgery: 5/19/2025    Procedure(s):  Right breast wire localized lumpectomy  Indication: Ductal carcinoma in situ (DCIS) of right breast [D05.11]    Relevant Problems   No relevant active problems       NPO:  Last Liquid Consumption Date: 05/19/25  Last Liquid Consumption Time: 0000  Last Solid Consumption Date: 05/18/25  Last Solid Consumption Time: 2000  Last Liquid Consumption Date: 05/19/25          History Review:  Patient Active Problem List    Diagnosis Date Noted    Lung nodules 04/26/2025    Ductal carcinoma in situ (DCIS) of right breast 04/11/2025    BRCA gene mutation negative in female 03/2025    PAD (peripheral artery disease) 01/06/2025    History of ductal carcinoma in situ (DCIS) of right breast 11/27/2024    Chronic cough 06/10/2024    Age-related osteoporosis without current pathological fracture 06/10/2024    Pancreatic cyst (HCC) 06/10/2024    Medicare annual wellness visit, subsequent 06/10/2024    Hyperlipidemia associated with type 2 diabetes mellitus (HCC) 04/07/2019    Colon cancer screening 11/02/2018    Atherosclerosis of aorta 06/12/2018    Glaucoma 11/14/2016    Type 2 diabetes mellitus without complication, without long-term current use of insulin (HCC) 04/27/2012    Vitamin D deficiency 10/31/2011    Allergic rhinitis 03/15/2011    Hypertension associated with type 2 diabetes mellitus (HCC) 01/03/2008       Past Medical History[1]    Past Surgical History[2]    Prescriptions Prior to Admission[3]  Current Medications and Prescriptions Ordered in Epic[4]    Allergies[5]    Family History[6]  Social Hx on file[7]    Available pre-op labs reviewed.  Lab Results   Component Value Date    WBC 7.9 05/14/2025    RBC 3.90 05/14/2025    HGB 11.7 (L) 05/14/2025    HCT 36.4 05/14/2025    MCV 93.3 05/14/2025    MCH 30.0 05/14/2025     MCHC 32.1 05/14/2025    RDW 15.0 05/14/2025    .0 05/14/2025     Lab Results   Component Value Date     05/14/2025    K 4.0 05/14/2025     05/14/2025    CO2 29.0 05/14/2025    BUN 18 05/14/2025    CREATSERUM 0.97 05/14/2025    GLU 92 05/14/2025    PGLU 114 (H) 05/19/2025    CA 9.2 05/14/2025     Lab Results   Component Value Date    INR 0.87 05/14/2025       Vital Signs:  Body mass index is 26.22 kg/m².   height is 1.6 m (5' 3\") and weight is 67.1 kg (148 lb). Her oral temperature is 97.8 °F (36.6 °C). Her blood pressure is 140/68 and her pulse is 86. Her respiration is 15 and oxygen saturation is 94%.   Vitals:    05/15/25 1341 05/19/25 0637   BP:  140/68   Pulse:  86   Resp:  15   Temp:  97.8 °F (36.6 °C)   TempSrc:  Oral   SpO2:  94%   Weight: 67.1 kg (148 lb) 67.1 kg (148 lb)   Height: 1.6 m (5' 3\")         Anesthesia Evaluation     Patient summary reviewed and Nursing notes reviewed    No history of anesthetic complications   Airway   Mallampati: II  Neck ROM: full  Dental      Pulmonary - negative ROS and normal exam   Cardiovascular - normal exam  (+) hypertension    Neuro/Psych - negative ROS     GI/Hepatic/Renal    (+) GERD    Endo/Other    (+) diabetes mellitus  Abdominal  - normal exam                 Anesthesia Plan:   ASA:  3  Plan:   MAC  Informed Consent Plan and Risks Discussed With:  Patient  Discussed plan with:  CRNA      I have informed Beatriz Fisher Co and/or legal guardian or family member of the nature of the anesthetic plan, benefits, risks including possible dental damage if relevant, major complications, and any alternative forms of anesthetic management.   All of the patient's questions were answered to the best of my ability. The patient desires the anesthetic management as planned.  Kennedy Ruggiero MD  5/19/2025 9:55 AM  Present on Admission:  **None**           [1]   Past Medical History:   Allergic rhinitis    BRCA gene mutation negative in female    Negative 48  gene hereditary cancer panel ([employer] common hereditary cancers + RNA panel), report in media tab    Calculus of kidney    Cataracts, bilateral    Diabetes (HCC)    Diabetes mellitus, type II (HCC)    Ductal carcinoma in situ of right breast    Endometrial thickening on ultra sound    Esophageal reflux    Exposure to medical diagnostic radiation    Glaucoma, right eye    High blood pressure    High cholesterol    Lumbar arthropathy    Osteoarthritis    Other and unspecified hyperlipidemia    Personal history of antineoplastic chemotherapy    oral chemotx    Positive PPD    S/P INH prophylaxis for 6 months     Unspecified essential hypertension    Visual impairment    readers    Vitamin B12 deficiency anemia    Vitamin D deficiency   [2]   Past Surgical History:  Procedure Laterality Date    Biopsy of breast, needle core Bilateral 2011    Breast biopsy Right 2001    [facility]    Colonoscopy  2008    Colonoscopy  06/2021    Colonoscopy N/A 06/25/2021    Procedure: COLONOSCOPY,;  Surgeon: Dirk Robbins MD;  Location: [facility]    Hysterectomy  2016    Laparoscopy,vag hysterectomy  09/26/2016    w/ BSO, benign    Lumpectomy right  08/06/2010    Mri breast biopsy w/ clip 1 site right (cpt=19085)  03/10/2025    Needle biopsy left Left 08/12/2011    Radiation right  2011    breast   [3]   Medications Prior to Admission   Medication Sig Dispense Refill Last Dose/Taking    amLODIPine 2.5 MG Oral Tab TAKE 1 TABLET BY MOUTH IN THE AM AND 1 TABLET BEFORE BEDTIME. 180 tablet 3 5/19/2025 at  5:15 AM    atorvastatin 40 MG Oral Tab Take 1 tablet (40 mg total) by mouth nightly. 90 tablet 3 5/18/2025 Bedtime    losartan 50 MG Oral Tab Take 1 tablet (50 mg total) by mouth 2 (two) times daily. 180 tablet 3 5/17/2025    montelukast 10 MG Oral Tab Take 1 tablet (10 mg total) by mouth nightly. 30 tablet 3 Past Week    cholecalciferol (VITAMIN D3) 125 MCG (5000 UT) Oral Cap Take 1 capsule (5,000 Units total) by mouth  daily. 90 capsule 3 5/18/2025 Morning    Calcium Carbonate-Vitamin D 600-200 MG-UNIT Oral Cap Take 1 capsule by mouth every morning.   5/18/2025 Morning    Multiple Vitamin (MULTI-DAY) Oral Tab Take 1 tablet by mouth in the morning.   5/18/2025 Bedtime    albuterol 108 (90 Base) MCG/ACT Inhalation Aero Soln Inhale 1 puff into the lungs every 4 to 6 hours as needed. FOR ASTHMA. Pharmacist, please switch to formulary alternative per insurance coverage, ok to replace with proair, ventolin, proventil, or any other albuterol inhaler. -Dr. Ellison 1 each 11 More than a month    Glucose Blood (TRUE METRIX BLOOD GLUCOSE TEST) In Vitro Strip 1 strip by In Vitro route daily. 100 strip 11     Lancets Does not apply Misc Test once daily 100 each 11     Blood Glucose Monitoring Suppl (TRUE METRIX AIR GLUCOSE METER) w/Device Does not apply Kit Use to test Blood sugar daily 1 kit 0     aspirin 81 MG Oral Tab EC Take by mouth. take 1 tablet (81MG)  by ORAL route  every day   5/7/2025   [4]   Current Facility-Administered Medications Ordered in Epic   Medication Dose Route Frequency Provider Last Rate Last Admin    lactated ringers infusion   Intravenous Continuous Licha Lind MD 20 mL/hr at 05/19/25 0640 New Bag at 05/19/25 0640    ceFAZolin (Ancef) 2g in 10mL IV syringe premix  2 g Intravenous Once Licha Lind MD         No current [facility]-ordered outpatient medications on file.   [5]   Allergies  Allergen Reactions    Latex OTHER (SEE COMMENTS)     Redness on the skin with use of latex gloves   [6]   Family History  Problem Relation Age of Onset    Heart Disease Mother 77        CAD    Hypertension Mother         mycardial infarction    Musculo-skelatal Disorder Mother         osteoporosis    Heart Disorder Mother     Stroke Father 84    Hypertension Father 82        myocardial infarction    Heart Disease Father 82        CAD    Heart Disorder Father     Breast Cancer Sister 50        chemo; doing fine    Breast Cancer  Maternal Aunt 60 - 69        post,  dt metastases    Breast Cancer Self 60    DCIS Self 60   [7]   Social History  Socioeconomic History    Marital status:     Number of children: 0   Occupational History    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

## 2025-05-19 NOTE — IMAGING NOTE
0759 Pt  to mammography department in wheelchair, transporter escort      Hx taken, procedure explained, questions answered.  IV patent, no redness or swelling noted at site     Consent signed and verified     0809 Dr CREWS here. Order verified and signed by all  procedural staff members    0810 Time out taken, site marked RIGHT Breast    0811 Scouts completed by Aimee mammography technologist     0813 Chloro prep as skin prep to site.  Lidocaine 1% 10 milligrams per ml given as anesthetic affect from kit 3 ml total given.    0814 Litiatas needle  20 gauge  X 5 cm placed.  Pt re-imaged, procedure complete at 0818.    0819 BB marker to site wire secured to breast  strips.  A 4x4 dsg secured  over wire with tape by this RN after images completed .    IV patent, no redness or swelling noted at site. Pt feels well.     0838 Transporter arrived to return pt to her room.

## 2025-05-19 NOTE — H&P
History of Present Illness:   Ms. Beatriz Rosario is a 74 year old woman who presents with remote history of right breast DCIS that she said was treated about 20 years ago.  She had a lumpectomy followed by radiation and 5 years of tamoxifen at that time.  She said that over the past few months she has had increased pain and pressure in the right breast.  She had a bilateral diagnostic evaluation in June 2024 at which time she was evaluated for a concern in the left breast with no imaging correlate.  She no longer is following with any additional oncology providers.  She denies any nipple discharge or skin change.  She also has a remote history of a benign left breast needle biopsy and a family history of breast cancer in her sister.  She does not have any known genetic mutation in the family and did not previously undergo genetic testing.  I had the patient obtain an MRI of her breast due to the history and her discomfort and this took place in February 27, 2025.  She was noted to have a 6 mm enhancing mass in the lower outer right breast that was deemed to be indeterminant and a biopsy was recommended.  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  She is here today for evaluation and recommendations for further therapy.        Past Medical History       Past Medical History:    Allergic rhinitis    BRCA gene mutation negative in female     Negative 48 gene hereditary cancer panel ([employer] common hereditary cancers + RNA panel), report in media tab    Cataracts, bilateral    Diabetes (HCC)    Diabetes mellitus, type II (HCC)    Ductal carcinoma in situ of right breast    Endometrial thickening on ultra sound    Glaucoma, right eye    Lumbar arthropathy    Osteoarthritis    Other and unspecified hyperlipidemia    Positive PPD     S/P INH prophylaxis for 6 months     Unspecified essential hypertension    Vitamin B12 deficiency anemia    Vitamin D  deficiency            Past Surgical History         Past Surgical History:   Procedure Laterality Date    Biopsy of breast, needle core Bilateral     Breast biopsy Right      [facility]    Colonoscopy       Colonoscopy   2021    Colonoscopy N/A 2021     Procedure: COLONOSCOPY,;  Surgeon: Dirk Robbins MD;  Location: [facility]    Hysterectomy   2016    Laparoscopy,vag hysterectomy   2016     w/ BSO, benign    Lumpectomy right   2010    Mri breast biopsy w/ clip 1 site right (cpt=19085)   03/10/2025    Needle biopsy left Left 2011    Radiation right        breast            Gynecological History:  Pt is a   She achieved menarche at age: 14 and LMP 50s  Age of Menopause: 50s  Type: natural menopause  She denies any history of hormone replacement therapy   She denies any history of oral contraceptive use   She denies infertility treatment to achieve pregnancy.     Medications:    Current Medications    methylPREDNISolone 4 MG Oral Tablet Therapy Pack Take as directed with food. 1 each 0    fluticasone-salmeterol 100-50 MCG/ACT Inhalation Aerosol Powder, Breath Activated Inhale 1 puff into the lungs 2 (two) times daily. 60 each 3    albuterol 108 (90 Base) MCG/ACT Inhalation Aero Soln Inhale 1 puff into the lungs every 4 to 6 hours as needed. FOR ASTHMA. Pharmacist, please switch to formulary alternative per insurance coverage, ok to replace with proair, ventolin, proventil, or any other albuterol inhaler. -Dr. Ellison 1 each 11    benzonatate 100 MG Oral Cap Take 1 capsule (100 mg total) by mouth 3 (three) times daily as needed for cough. 30 capsule 0    losartan 50 MG Oral Tab Take 1 tablet (50 mg total) by mouth 2 (two) times daily. 180 tablet 3    amLODIPine 2.5 MG Oral Tab TAKE 1 TABLET BY MOUTH IN THE AM AND 1 TABLET BEFORE BEDTIME. 180 tablet 3    atorvastatin 40 MG Oral Tab Take 1 tablet (40 mg total) by mouth nightly. 90 tablet 3    Glucose Blood  (TRUE METRIX BLOOD GLUCOSE TEST) In Vitro Strip 1 strip by In Vitro route daily. 100 strip 11    Lancets Does not apply Misc Test once daily 100 each 11    hydroCHLOROthiazide 12.5 MG Oral Tab Take 1 tablet (12.5 mg total) by mouth 2 (two) times daily. (Patient taking differently: Take 1 tablet (12.5 mg total) by mouth as needed (as needed for feet swelling).) 180 tablet 3    montelukast 10 MG Oral Tab Take 1 tablet (10 mg total) by mouth nightly. (Patient taking differently: Take 1 tablet (10 mg total) by mouth once as needed.) 30 tablet 3    Blood Glucose Monitoring Suppl (TRUE METRIX AIR GLUCOSE METER) w/Device Does not apply Kit Use to test Blood sugar daily 1 kit 0    Calcium Carbonate-Vitamin D 600-200 MG-UNIT Oral Cap Take by mouth. 1 po q12hrs.        aspirin 81 MG Oral Tab EC Take by mouth. take 1 tablet (81MG)  by ORAL route  every day        Multiple Vitamin (MULTI-DAY) Oral Tab Take by mouth.                Allergies:    [Allergies]    [Allergies]       Allergen Reactions    Latex          Family History:   Family History         Family History   Problem Relation Age of Onset    Heart Disease Mother 77         CAD    Hypertension Mother           mycardial infarction    Musculo-skelatal Disorder Mother           osteoporosis    Heart Disorder Mother      Stroke Father 84    Hypertension Father 82         myocardial infarction    Heart Disease Father 82         CAD    Heart Disorder Father      Breast Cancer Sister 50         chemo; doing fine    Breast Cancer Maternal Aunt 60 - 69         post,  dt metastases    Breast Cancer Self 60    DCIS Self 60            She is not of Ashkenazi Buddhist ancestry.     Social History:      History   Alcohol Use No             History   Smoking Status    Never   Smokeless Tobacco    Never   Ms. Beatriz Fisher Co is  with 0 children. She has 9 siblings. She is currently Employed part-time        Review of Systems:  General:   The patient denies, fever, chills,  night sweats, fatigue, generalized weakness, change in appetite or weight loss.     HEENT:     The patient denies eye irritation, cataracts, redness, glaucoma, yellowing of the eyes, change in vision, color blindness, or wearing contacts/glasses. The patient denies hearing loss, ringing in the ears, ear drainage, earaches, nasal congestion, nose bleeds, +snoring, pain in mouth/throat, hoarseness, change in voice, facial trauma.     Respiratory:  The patient denies +chronic cough, phlegm, hemoptysis, pleurisy/chest pain, pneumonia, asthma, wheezing, difficulty in breathing with exertion, emphysema, chronic bronchitis, shortness of breath or abnormal sound when breathing.      Cardiovascular:  There is no history of chest pain, chest pressure/discomfort, palpitations, irregular heartbeat, fainting or near-fainting, difficulty breathing when lying flat, SOB/Coughing at night, swelling of the legs or chest pain while walking.     Breasts:  See history of present illness     Gastrointestinal:     There is no history of difficulty or pain with swallowing, +reflux symptoms, vomiting, dark or bloody stools, constipation, yellowing of the skin, indigestion, nausea, change in bowel habits, diarrhea, abdominal pain or vomiting blood.      Genitourinary:  The patient denies frequent urination, needing to get up at night to urinate, urinary hesitancy or retaining urine, painful urination, urinary incontinence, decreased urine stream, blood in the urine or vaginal/penile discharge.     Skin:    The patient denies rash, itching, skin lesions, dry skin, change in skin color or change in moles.      Hematologic/Lymphatic:  The patient denies easily bruising or bleeding or persistent swollen glands or lymph nodes.      Musculoskeletal:  The patient denies muscle aches/pain, +joint pain, +stiff joints, neck pain, back pain or bone pain.     Neuropsychiatric:  There is no history of migraines or severe headaches, seizure/epilepsy,  speech problems, coordination problems, trembling/tremors, fainting/black outs, dizziness, memory problems, loss of sensation/numbness, problems walking, weakness, tingling or burning in hands/feet. There is no history of abusive relationship, bipolar disorder, sleep disturbance, anxiety, depression or feeling of despair.     Endocrine:    There is no history of poor/slow wound healing, weight loss/gain, fertility or hormone problems, cold intolerance, thyroid disease.      Allergic/Immunologic:  There is no history of hives, hay fever, angioedema or anaphylaxis.     /65 (BP Location: Left arm, Patient Position: Sitting, Cuff Size: adult)   Pulse 90   Temp 97 °F (36.1 °C) (Temporal)   Resp 16   Wt 67.1 kg (148 lb)   SpO2 95%   BMI 26.22 kg/m²      Physical Exam:  The patient is an alert, oriented, well-nourished and  well-developed woman who appears her stated age. Her speech patterns and movements are normal. Her affect is appropriate.     HEENT: The head is normocephalic. The neck is supple. The thyroid is not enlarged and is without palpable masses/nodules. There are no palpable masses. The trachea is in the midline. Conjunctiva are clear, non-icteric.     Chest: The chest expands symmetrically. The lungs are clear to auscultation.     Heart: The rhythm is regular.  There are no murmurs, rubs, gallops or thrills.     Breasts:  Her breasts are symmetrical with bra size 40B  Right breast: The skin, nipple ,and areola appear normal. There is no skin dimpling with movement of the pectoralis. There is no nipple retraction. No nipple discharge can be elicited. The parenchyma is mildly nodular. There are no dominant masses in the breast. The axillary tail is normal.  There is a well-healed incision on the inframammary fold with radiation related changes and no palpable concern.  Left breast:   The skin, nipple, and areola appear normal. There is no skin dimpling with movement of the pectoralis. There is no  nipple retraction. No nipple discharge can be elicited. The parenchyma is mildly nodular. There are no dominant masses in the breast. The axillary tail is normal.     Abdomen:  The abdomen is soft, flat and non tender. The liver is not enlarged. There are no palpable masses.     Lymph Nodes:  The supraclavicular, axillary and cervical regions are free of significant lymphadenopathy.     Back: There is no vertebral column tenderness.     Skin: The skin appears normal. There are no suspicious appearing rashes or lesions.     Extremities: The extremities are without deformity, cyanosis or edema.     Impression:   Ms. Beatriz Rosario is a 74 year old woman presents with personal history of right breast DCIS with recent onset right breast pain.     Discussion and Plan:  I had a discussion with the Patient regarding her breast exam. On exam today she is healing well since her recent biopsy with no other clinical findings.  I personally reviewed the recent imaging and pathology we discussed this at length.     The natural history and evolution of DCIS was discussed with Ms. Beatriz Rosario and her family. This  included the difference between in-situ and invasive carcinoma, and the distinction between  local and systemic disease and local and systemic therapy. For local treatment options, I  explained the risks and benefits of breast conservation and mastectomy (with or without  reconstruction), including the fact that survival rates are essentially equal with these two  approaches. If breast conservation is elected, I explained the need for free margins, the  possibility of re-excision to achieve free margins, and the need for post-operative radiotherapy.  The patient was told that bebeto staging is not usually required for DCIS, but is sometimes  recommended depending on the size and grade of the lesion, and if mastectomy is planned  for treatment. The reasons for this were explained. I explained that for pure DCIS,  systemic  therapy is not required, although endocrine agents such as tamoxifen can be used in women  with hormone sensitive disease in order to reduce the risk of local recurrence and future new  Primaries.  We discussed that the standard of care of a new ipsilateral area of DCIS given a history of lumpectomy with prior radiation would be a completion mastectomy.  Patient lives alone and has no means of transportation is very concerned about a lengthy recovery in light of these factors.  She inquired about the possibility of a repeat lumpectomy and her case was discussed at multidisciplinary tumor board.  It was thought that with negative margins the future recurrence risk would be low at approximately 0.5 to 1% risk per year and that a consideration of risk reducing endocrine therapy and or possible repeat radiation course would be reasonable and allow her to have a potentially acceptable low risk of recurrence without a mastectomy.  She is motivated to proceed with a right breast wire localized lumpectomy in light of these findings.  I will have her meet with both medical and radiation oncology postoperatively to discuss risk reducing strategies.  The patient did proceed with genetic testing in March 2025 the results of which were negative.  The risks and possible complications were discussed with the patient at length and she agreed to proceed.  She was given ample opportunity for questions and those questions were answered to her satisfaction. She has been  encouraged to contact the office with any questions or concerns prior to her next appointment.      This note was created by Dragon voice recognition. Errors in content may be related to improper recognition by the system; efforts to review and correct have been done but errors may still exist. Please be advised the primary purpose of this note is for me to communicate medical care. Standard sentence structure is not always used. Medical terminology and  medical abbreviations may be used. There may be grammatical, typographical, and automated fill ins that may have errors missed in proofreading.    Pre-op Diagnosis: Ductal carcinoma in situ (DCIS) of right breast [D05.11]    The above referenced H&P was reviewed by Licha Lind MD on 5/19/2025, the patient was examined and no significant changes have occurred in the patient's condition since the H&P was performed.  I discussed with the patient and/or legal representative the potential benefits, risks and side effects of this procedure; the likelihood of the patient achieving goals; and potential problems that might occur during recuperation.  I discussed reasonable alternatives to the procedure, including risks, benefits and side effects related to the alternatives and risks related to not receiving this procedure.  We will proceed with procedure as planned.

## 2025-05-19 NOTE — BRIEF OP NOTE
Pre-Operative Diagnosis: Ductal carcinoma in situ (DCIS) of right breast [D05.11]     Post-Operative Diagnosis: Ductal carcinoma in situ (DCIS) of right breast [D05.11]      Procedure Performed:   Right breast wire localized lumpectomy    Surgeons and Role:     * Licha Lind MD - Primary    Assistant(s):  Surgical Assistant.: Sakshi Cassidy CSA     Surgical Findings: Clip visualized on specimen radiogram     Specimen: Right breast lumpectomy, right breast margins x 6     Estimated Blood Loss: 5Cc    Licha Lind MD  5/19/2025  11:47 AM

## 2025-05-19 NOTE — OPERATIVE REPORT
[facility]    PATIENT'S NAME: DONAVAN GONZALEZ   ATTENDING PHYSICIAN: Licha Lind MD   OPERATING PHYSICIAN: Licha Lind MD   PATIENT ACCOUNT#:   [id number]    LOCATION:  [facility]  MEDICAL RECORD #:   [id number]       YOB: 1950  ADMISSION DATE:       05/19/2025      OPERATION DATE:  05/19/2025    OPERATIVE REPORT      PREOPERATIVE DIAGNOSIS:  Right breast ductal carcinoma in situ.  POSTOPERATIVE DIAGNOSIS:  Right breast ductal carcinoma in situ.  PROCEDURE:  Right breast wire-localized lumpectomy with right breast specimen radiography and right breast advancement flap mastopexy for a defect measuring 12 sq cm.     ASSISTANT:  Sakshi Cassidy CSA.    ANESTHESIA:  Monitored anesthesia care and local.    ESTIMATED BLOOD LOSS:  5 mL.    DRAINS:  None.    COMPLICATIONS:  None.    DISPOSITION:  Stable on transfer to recovery room.    INDICATIONS:  The patient is a 74-year-old female.  She has a remote history of a right breast DCIS treated greater than 20 years ago with lumpectomy and radiation, followed by 5 years of tamoxifen at that time, who presents for a new imaging-detected concern of the right breast consistent with a 6 mm enhancing mass.  She had a recent MRI that was done due to recent breast discomfort.  Her biopsy confirmed multifocal ductal carcinoma in situ in this location.  We discussed local treatment options and standard of care being a mastectomy in the setting of a prior lumpectomy and radiation.  However, due to age and comorbidities and wishing to avoid a large surgery, the patient is motivated for a repeat breast-conserving approach.  We discussed risks and possible complications of the lumpectomy, including, but not limited to, infection, bleeding, injury to surrounding structures, and possible need for repeat operation, and she agreed to the proposed surgery.    OPERATIVE TECHNIQUE:  Patient was brought to the imaging suite where she had a  wire localization of the area of concern in the right breast.  She was then brought to the OR, placed in supine position, properly padded and secured, given a dose of IV antibiotics, and sequential compression devices were applied to the legs for DVT prophylaxis.  Monitored anesthesia care was induced.  The right breast was prepped and draped in the usual sterile fashion.  Lidocaine 1% with epinephrine was used to infiltrate the targeted incision site.  I used the prior incision near the lower outer inferior right breast.  This was incised with a 15 blade knife in the skin.  Her wire was identified, brought into the field, and a segment of breast tissue surrounding the tip of the wire was carefully excised and oriented with a short stitch and single clip superiorly, a long stitch and double clip laterally in order to allow for appropriate pathological margin and specimen review.  It was then placed in the imaging device where a specimen x-ray confirmed the presence of the targeted area and clip with adequate margins as deemed by myself.  Using sharp dissection and electrocautery, 6 margins were then taken from the interior of the lumpectomy cavity.  Each were marked with a clip at true margin, individually labeled, and sent for routine permanent pathologic evaluation.  The wound was then irrigated with warm sterile saline.  Hemostasis assured with electrocautery.  Hemoclips were placed around the periphery of the cavity to assist with subsequent surveillance.  She was found to have a large defect measuring at least 12 sq cm in the lower central and inferior breast, thought to impair both wound healing and cosmesis, for which we proceeded with an advancement flap mastopexy.  This required I place a counterincision through the immediate adjacent medial superior breast parenchyma down to the level of pectoralis fascia.  I then used a superior vascular pedicle, mobilized this into the aforementioned defect, and secured  it to the level of pectoralis fascia with a running 3-0 PDS suture.  Her wound was then closed with an interrupted 3-0 Vicryl for deep layer and a running 4-0 subcuticular Monocryl for skin.  Mastisol and Steri-Strips were applied.  Marcaine 0.5% was instilled in the cavity to assist with postop anesthesia.  A sterile dressing and compression bra were placed.  Blood loss was minimal.  All counts were correct at the conclusion of the procedure.  She tolerated the procedure well and was transferred to Recovery in stable condition.    Dictated By Licha Lind MD  d: 05/19/2025 13:01:07  t: 05/19/2025 15:57:22  [facility] [id number]  CMG/    cc: Zack Ellison MD

## 2025-05-19 NOTE — DISCHARGE INSTRUCTIONS
HOME INSTRUCTIONS  Breast Surgery  Post-operative Instructions  Excisional Biopsy, Lumpectomy, Mastectomy, Burket Node Biopsy, or Axillary  Lymph Node Dissection  Licha Lind MD  General Instructions  The following instructions will provide helpful information that will assist your recovery. These are designed to be general guidelines. Please remember that everyone heals and recovers differently. Listen to your body and rest when you are tired. If you have any questions or concerns, please do not hesitate to contact my office. I would like to see you in the office about one week after surgery, please schedule and appointment through my office to make a post-operative appointment if you do not already have one.     Restrictions  There are no lifting weight restrictions for the arm on the surgical side. You may gradually increase the amount of weight based on your comfort level. You should avoid a lot of repetitious activity with the arm until the drain is out (if one was placed) and the wound is well-healed (about two weeks).   You should not drive a car until you believe you can react to an emergency situation and you’re no longer taking narcotic pain medications.   You may shower the day after surgery. You should not bathe or swim (i.e. submerge wound) until the wound is well healed (about two weeks).  There are no dietary restrictions.    Exercise  You may begin arm exercises within a couple days. Do these 2 or 3 times per day, beginning with light exercise and gradually increase your range of motion and repetitions. This will help your arm regain full mobility. We will address your activity level again at your post-operative visit.   You will have pain medication prescribed before discharge. Take this as directed to relieve pain. It is important that you be comfortable so that you may continue your stretching exercises.   If you find the medication prescribed is too strong, try Tylenol (Acetaminophen)  or Ibuprofen.    Wound Care  You may remove the gauze dressing on the first or second postoperative day and then shower. You should leave the steri-strips in place; they will start to peel off about 10 days after your surgery. The stitches are all underneath the skin and will dissolve on their own. You will not need any stitches removed except if you have a drain in place.  I encourage you to shower once the outer bandage is removed, you may use soap and water directly over the steri-strips and pat dry following.  You should keep gauze dressing on the wound until the wound is completely dry and without drainage-usually 1-3 days.   If a surgical bra was placed after the surgery, I encourage you to wear it as much as possible during the week following the procedure (including during sleep). Alternatively, you may choose to wear your own bra provided it is comfortable, provides support and does not have an underwire. If the breast doesn’t move it is less painful.  If an elastic bandage was placed around your chest after the surgery you may remove it on the 1st or 2nd day after surgery. If you prefer to leave it on longer, you may.  It is normal to feel a lump in the area of the incisions for up to 6 months. This is part of the healing process. Eventually the breast will return to its normal condition.     Pain Medication  You will be given a prescription for a narcotic pain medication (usually Norco) upon discharge. Many patients have very little pain and don’t want to use the narcotic. Don’t be afraid to use it if you’re uncomfortable. If you’d prefer you may substitute Tylenol or Ibuprofen (Motrin, Advil). Using an ice pack for a few minutes over the incision can also alleviate pain. If you do use the narcotic medication, use an over the counter stool softener or gentle laxative and stay well-hydrated as constipation is not uncommon with narcotics.    Pathology Report  The Pathology report is usually available 4-5  business days following the surgery. I will call you  with the results once the report is available.    Notify my office if:   Your temperature is over 101.5 F   You notice increasing swelling, redness, warmth or drainage from around the incision or drain site.    If you experience any problems please call my office and either my nurse or myself will respond. After hours, you will be forwarded to my answering service which will help you get in touch with myself or the physician covering for me.    [facility] HOME CARE INSTRUCTIONS: POST-OP ANESTHESIA  The medication that you received for sedation or general anesthesia can last up to 24 hours. Your judgment and reflexes may be altered, even if you feel like your normal self.      We Recommend:   Do not drive any motor vehicle or bicycle   Avoid mowing the lawn, playing sports, or working with power tools/applicances (power saws, electric knives or mixers)   That you have someone stay with you on your first night home   Do not drink alcohol or take sleeping pills or tranquilizers   Do not sign legal documents within 24 hours of your procedure   If you had a nerve block for your surgery, take extra care not to put any pressure on your arm or hand for 24 hours    It is normal:  For you to have a sore throat if you had a breathing tube during surgery (while you were asleep!). The sore throat should get better within 48 hours. You can gargle with warm salt water (1/2 tsp in 4 oz warm water) or use a throat lozenge for comfort  To feel muscle aches or soreness especially in the abdomen, chest or neck. The achy feeling should go away in the next 24 hours  To feel weak, sleepy or \"wiped out\". Your should start feeling better in the next 24 hours.   To experience mild discomforts such as sore lip or tongue, headache, cramps, gas pains or a bloated feeling in your abdomen.   To experience mild back pain or soreness for a day or two if you had spinal or epidural anesthesia.   If  you had laparoscopic surgery, to feel shoulder pain or discomfort on the day of surgery.   For some patients to have nausea after surgery/anesthesia    If you feel nausea or experience vomiting:   Try to move around less.   Eat less than usual or drink only liquids until the next morning   Nausea should resolve in about 24 hours    If you have a problem when you are at home:    Call your surgeons office   Discharge Instructions: After Your Surgery  You’ve just had surgery. During surgery, you were given medicine called anesthesia to keep you relaxed and free of pain. After surgery, you may have some pain or nausea. This is common. Here are some tips for feeling better and getting well after surgery.   Going home  Your healthcare provider will show you how to take care of yourself when you go home. They'll also answer your questions. Have an adult family member or friend drive you home. For the first 24 hours after your surgery:   Don't drive or use heavy equipment.  Don't make important decisions or sign legal papers.  Take medicines as directed.  Don't drink alcohol.  Have someone stay with you, if needed. They can watch for problems and help keep you safe.  Be sure to go to all follow-up visits with your healthcare provider. And rest after your surgery for as long as your provider tells you to.   Coping with pain  If you have pain after surgery, pain medicine will help you feel better. Take it as directed, before pain becomes severe. Also, ask your healthcare provider or pharmacist about other ways to control pain. This might be with heat, ice, or relaxation. And follow any other instructions your surgeon or nurse gives you.      Stay on schedule with your medicine.     Tips for taking pain medicine  To get the best relief possible, remember these points:   Pain medicines can upset your stomach. Taking them with a little food may help.  Most pain relievers taken by mouth need at least 20 to 30 minutes to start to  work.  Don't wait till your pain becomes severe before you take your medicine. Try to time your medicine so that you can take it before starting an activity. This might be before you get dressed, go for a walk, or sit down for dinner.  Constipation is a common side effect of some pain medicines. Call your healthcare provider before taking any medicines, such as laxatives or stool softeners, to help ease constipation. Also ask if you should skip any foods. Drinking lots of fluids and eating foods, such as fruits and vegetables, that are high in fiber can also help. Remember, don't take laxatives unless your surgeon has prescribed them.  Drinking alcohol and taking pain medicine can cause dizziness and slow your breathing. It can even be deadly. Don't drink alcohol while taking pain medicine.  Pain medicine can make you react more slowly to things. Don't drive or run machinery while taking pain medicine.  Your healthcare provider may tell you to take acetaminophen to help ease your pain. Ask them how much you're supposed to take each day. Acetaminophen or other pain relievers may interact with your prescription medicines or other over-the-counter (OTC) medicines. Some prescription medicines have acetaminophen and other ingredients in them. Using both prescription and OTC acetaminophen for pain can cause you to accidentally overdose. Read the labels on your OTC medicines with care. This will help you to clearly know the list of ingredients, how much to take, and any warnings. It may also help you not take too much acetaminophen. If you have questions or don't understand the information, ask your pharmacist or healthcare provider to explain it to you before you take the OTC medicine.   Managing nausea  Some people have an upset stomach (nausea) after surgery. This is often because of anesthesia, pain, or pain medicine, less movement of food in the stomach, or the stress of surgery. These tips will help you handle nausea  and eat healthy foods as you get better. If you were on a special food plan before surgery, ask your healthcare provider if you should follow it while you get better. Check with your provider on how your eating should progress. It may depend on the surgery you had. These general tips may help:   Don't push yourself to eat. Your body will tell you when to eat and how much.  Start off with clear liquids and soup. They're easier to digest.  Next try semi-solid foods as you feel ready. These include mashed potatoes, applesauce, and gelatin.  Slowly move to solid foods. Don’t eat fatty, rich, or spicy foods at first.  Don't force yourself to have 3 large meals a day. Instead eat smaller amounts more often.  Take pain medicines with a small amount of solid food, such as crackers or toast. This helps prevent nausea.  When to call your healthcare provider  Call your healthcare provider right away if you have any of these:   You still have too much pain, or the pain gets worse, after taking the medicine. The medicine may not be strong enough. Or there may be a complication from the surgery.  You feel too sleepy, dizzy, or groggy. The medicine may be too strong.  Side effects, such as nausea or vomiting. Your healthcare provider may advise taking other medicines to treat these or may change your treatment plan..  Skin changes, such as rash, itching, or hives. This may mean you have an allergic reaction. Your provider may advise taking other medicines.  The incision looks different (for instance, part of it opens up).  Bleeding or fluid leaking from the incision site, and you weren't told to expect that.  Fever of 100.4°F (38°C) or higher, or as directed by your healthcare provider.  Call 911  Call 911 right away if you have:   Trouble breathing  Facial swelling    If you have obstructive sleep apnea   You were given anesthesia medicine during surgery to keep you comfortable and free of pain. After surgery, you may have more  apnea spells because of this medicine and other medicines you were given. The spells may last longer than normal.    At home:  Keep using the continuous positive airway pressure (CPAP) device when you sleep. Unless your healthcare provider tells you not to, use it when you sleep, day or night. CPAP is a common device used to treat obstructive sleep apnea.  Talk with your provider before taking any pain medicine, muscle relaxants, or sedatives. Your provider will tell you about the possible dangers of taking these medicines.  Contact your provider if your sleeping changes a lot even when taking medicines as directed.  [id number] last reviewed this educational content on 4/1/2024  This information is for informational purposes only. This is not intended to be a substitute for professional medical advice, diagnosis, or treatment. Always seek the advice and follow the directions from your physician or other qualified health care provider.  © 1745-6375 [employer]. All rights reserved. This information is not intended as a substitute for professional medical care. Always follow your healthcare professional's instructions.

## 2025-05-19 NOTE — ANESTHESIA POSTPROCEDURE EVALUATION
Patient: Beatriz Rsoario    Procedure Summary       Date: 05/19/25 Room / Location: [facility] MAIN OR 08 / [facility] MAIN OR    Anesthesia Start: 1054 Anesthesia Stop: 1148    Procedure: Right breast wire localized lumpectomy (Right: Breast) Diagnosis:       Ductal carcinoma in situ (DCIS) of right breast      (Ductal carcinoma in situ (DCIS) of right breast [D05.11])    Surgeons: Licha Lind MD Anesthesiologist: Kennedy Ruggiero MD    Anesthesia Type: MAC ASA Status: 3            Anesthesia Type: MAC    Vitals Value Taken Time   /64 05/19/25 11:41   Temp 98 05/19/25 11:48   Pulse 81 05/19/25 11:48   Resp 12 05/19/25 11:48   SpO2 100 % 05/19/25 11:48   Vitals shown include unfiled device data.    EM AN Post Evaluation:   Patient Evaluated in PACU  Patient Participation: complete - patient participated  Level of Consciousness: awake  Pain Management: adequate  Airway Patency:patent  Dental exam unchanged from preop  Yes    Cardiovascular Status: acceptable  Respiratory Status: acceptable  Postoperative Hydration acceptable      Kennedy Ruggiero MD  5/19/2025 11:48 AM

## 2025-05-20 ENCOUNTER — TELEPHONE (OUTPATIENT)
Facility: CLINIC | Age: 75
End: 2025-05-20

## 2025-05-20 RX ORDER — GABAPENTIN 100 MG/1
100 CAPSULE ORAL 3 TIMES DAILY PRN
Qty: 270 CAPSULE | Refills: 0 | Status: SHIPPED | OUTPATIENT
Start: 2025-05-20 | End: 2025-08-18

## 2025-05-20 RX ORDER — OMEPRAZOLE 40 MG/1
40 CAPSULE, DELAYED RELEASE ORAL DAILY
Qty: 90 CAPSULE | Refills: 0 | Status: SHIPPED | OUTPATIENT
Start: 2025-05-20 | End: 2025-08-18

## 2025-05-20 NOTE — TELEPHONE ENCOUNTER
Please review;     Please see message below for upcoming appointment.    Future Appointments   Date Time Provider Department Center                 7/16/2025  9:30 AM Zack Ellison MD [facility]

## 2025-05-21 ENCOUNTER — TELEPHONE (OUTPATIENT)
Age: 75
End: 2025-05-21

## 2025-05-21 ENCOUNTER — TELEPHONE (OUTPATIENT)
Dept: CASE MANAGEMENT | Age: 75
End: 2025-05-21

## 2025-05-21 ENCOUNTER — TELEPHONE (OUTPATIENT)
Dept: INTERNAL MEDICINE CLINIC | Facility: CLINIC | Age: 75
End: 2025-05-21

## 2025-05-21 DIAGNOSIS — D05.11 DUCTAL CARCINOMA IN SITU (DCIS) OF RIGHT BREAST: Primary | ICD-10-CM

## 2025-05-21 NOTE — TELEPHONE ENCOUNTER
Dr Ellison,     Patient needs referral to Dr Gramajo for appointment 5/28/25.     Pended referral please review diagnosis and sign off if you agree.    Thank you.  Katherine Manzo  [facility]

## 2025-05-21 NOTE — TELEPHONE ENCOUNTER
I received a call from Mihaela the nurse navigator that she is not able to see the referral in patient referral tab but it is under other orders tab. She will like for it to reflect under the referrals.    I called manage care and spoke to Chiqui and she will place a new referral.

## 2025-05-21 NOTE — TELEPHONE ENCOUNTER
Phoned patient for continued care coordination.  Patient is s/p right lumpectomy, and scheduled for post operative follow up with Dr. Lind on 5/28/25.  Discussed scheduling medical oncology consultation for discussion of adjuvant treatment.  Patient agreeable to meeting with Dr. Gramajo on 5/28/25 at 1pm.  All appointment information provided.  Patient acknowledged an denied questions.

## 2025-05-28 ENCOUNTER — OFFICE VISIT (OUTPATIENT)
Facility: CLINIC | Age: 75
End: 2025-05-28
Payer: MEDICARE

## 2025-05-28 ENCOUNTER — OFFICE VISIT (OUTPATIENT)
Age: 75
End: 2025-05-28
Attending: STUDENT IN AN ORGANIZED HEALTH CARE EDUCATION/TRAINING PROGRAM
Payer: MEDICARE

## 2025-05-28 VITALS
DIASTOLIC BLOOD PRESSURE: 67 MMHG | TEMPERATURE: 98 F | WEIGHT: 148 LBS | HEART RATE: 72 BPM | OXYGEN SATURATION: 97 % | HEIGHT: 63 IN | SYSTOLIC BLOOD PRESSURE: 120 MMHG | RESPIRATION RATE: 16 BRPM | BODY MASS INDEX: 26.22 KG/M2

## 2025-05-28 VITALS
HEART RATE: 88 BPM | SYSTOLIC BLOOD PRESSURE: 125 MMHG | BODY MASS INDEX: 26 KG/M2 | OXYGEN SATURATION: 97 % | WEIGHT: 149 LBS | DIASTOLIC BLOOD PRESSURE: 73 MMHG

## 2025-05-28 DIAGNOSIS — D05.11 DUCTAL CARCINOMA IN SITU (DCIS) OF RIGHT BREAST: Primary | ICD-10-CM

## 2025-05-28 DIAGNOSIS — D05.11 BREAST NEOPLASM, TIS (DCIS), RIGHT: Primary | ICD-10-CM

## 2025-05-28 PROCEDURE — 3078F DIAST BP <80 MM HG: CPT | Performed by: SURGERY

## 2025-05-28 PROCEDURE — 3074F SYST BP LT 130 MM HG: CPT | Performed by: SURGERY

## 2025-05-28 PROCEDURE — 1126F AMNT PAIN NOTED NONE PRSNT: CPT | Performed by: SURGERY

## 2025-05-28 PROCEDURE — 99024 POSTOP FOLLOW-UP VISIT: CPT | Performed by: SURGERY

## 2025-05-28 RX ORDER — RALOXIFENE HYDROCHLORIDE 60 MG/1
60 TABLET, FILM COATED ORAL DAILY
Qty: 30 TABLET | Refills: 6 | Status: SHIPPED | OUTPATIENT
Start: 2025-05-28

## 2025-05-28 NOTE — PATIENT INSTRUCTIONS
VISIT SUMMARY:  Today, we discussed your recent diagnosis of recurrent multifocal ductal carcinoma in situ (DCIS) of the right breast and the plan for adjuvant endocrine therapy. We also reviewed your history of osteopenia, pulmonary nodules, right rotator cuff issues, and a pancreatic cyst. Additionally, we discussed your upcoming gastrointestinal evaluation.    YOUR PLAN:  -DUCTAL CARCINOMA IN SITU (DCIS) OF RIGHT BREAST: DCIS is a non-invasive breast cancer where abnormal cells are found in the lining of a breast duct but have not spread outside the duct. You have recurrent multifocal DCIS in the right breast, initially treated 20 years ago. Recently, you had a right breast lumpectomy and advancement flap mastopexy, with final pathology showing focal residual DCIS and negative margins. We discussed adjuvant endocrine therapy options and decided on raloxifene due to its benefits for bone density and lower cardiovascular risk. You will start with a 30-day supply of raloxifene. [facility] will [facility] and in six months for a mammogram review.    -OSTEOPENIA: Osteopenia is a condition where bone mineral density is lower than normal, which can lead to an increased risk of fractures. Your baseline DEXA scan showed osteopenia, which influenced our decision to prescribe raloxifene for your DCIS due to its potential to improve bone density.    -PULMONARY NODULES: Pulmonary nodules are small growths in the lungs that are usually benign but need monitoring. Your CT calcium score showed pulmonary nodules, and a follow-up CT of the chest is recommended. We will schedule this CT in July.    -RIGHT ROTATOR CUFF PROBLEM: A rotator cuff problem involves the muscles and tendons that stabilize the shoulder. You have a chronic issue with your right rotator cuff, previously treated with steroid injections that provided relief. We will contact your orthopedic doctor to arrange another steroid  injection.    -PANCREATIC CYST: A pancreatic cyst is a fluid-filled sac within the pancreas that requires further evaluation to determine its nature. We will schedule an MRI MRCP to evaluate the pancreatic cyst as soon as possible.    -GASTROINTESTINAL EVALUATION: You are scheduled for an endoscopy to evaluate your gastrointestinal system further. This procedure will help us understand any underlying issues. The endoscopy is scheduled for June 18, 2025.    INSTRUCTIONS:  1. Start taking raloxifene as prescribed, with a 30-day supply provided.  2. Schedule a follow-up visit in [facility] month at [facility].  3. Plan for a follow-up visit in six months for a mammogram review.  4. Schedule a CT of the chest in July to monitor pulmonary nodules.  5. Contact your orthopedic doctor to arrange a steroid injection for your right shoulder.  6. Schedule an MRI MRCP for pancreatic cyst evaluation as soon as possible.  7. Attend your scheduled endoscopy on June 18, 2025.    Contains text generated by Let[employer]

## 2025-05-28 NOTE — PROGRESS NOTES
Breast Surgery Post-Operative Visit    Diagnosis: Right breast DCIS status postlumpectomy in May 19, 2025    Stage: Tis NxMx    Disease Status:  Surgical treatment complete, medical oncology recommendations pending.    History:   This 74 year old woman presented with remote history of right breast DCIS that she said was treated about 20 years ago.  She had a lumpectomy followed by radiation and 5 years of tamoxifen at that time.  She said that over the past few months she has had increased pain and pressure in the right breast.  She had a bilateral diagnostic evaluation in June 2024 at which time she was evaluated for a concern in the left breast with no imaging correlate.  She no longer is following with any additional oncology providers.  She denies any nipple discharge or skin change.  She also has a remote history of a benign left breast needle biopsy and a family history of breast cancer in her sister.  She does not have any known genetic mutation in the family and did not previously undergo genetic testing.  I had the patient obtain an MRI of her breast due to the history and her discomfort and this took place in February 27, 2025.  She was noted to have a 6 mm enhancing mass in the lower outer right breast that was deemed to be indeterminant and a biopsy was recommended.  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  She underwent lumpectomy without complication.  She is here today for evaluation and recommendations for further therapy.        Past Medical History:    Allergic rhinitis    BRCA gene mutation negative in female    Negative 48 gene hereditary cancer panel ([employer] common hereditary cancers + RNA panel), report in media tab    Calculus of kidney    Cataracts, bilateral    Diabetes (HCC)    Diabetes mellitus, type II (HCC)    Ductal carcinoma in situ of right breast    Endometrial thickening on ultra sound    Esophageal reflux     Exposure to medical diagnostic radiation    Glaucoma, right eye    High blood pressure    High cholesterol    Lumbar arthropathy    Osteoarthritis    Other and unspecified hyperlipidemia    Personal history of antineoplastic chemotherapy    oral chemotx    Positive PPD    S/P INH prophylaxis for 6 months     Unspecified essential hypertension    Visual impairment    readers    Vitamin B12 deficiency anemia    Vitamin D deficiency       Past Surgical History:   Procedure Laterality Date    Biopsy of breast, needle core Bilateral 2011    Breast biopsy Right     [facility]    Colonoscopy      Colonoscopy  2021    Colonoscopy N/A 2021    Procedure: COLONOSCOPY,;  Surgeon: Dirk Robbins MD;  Location: [facility], [facility]    Hysterectomy  2016    Laparoscopy,vag hysterectomy  2016    w/ BSO, benign    Lumpectomy right  2010    Mri breast biopsy w/ clip 1 site right (cpt=19085)  03/10/2025    Needle biopsy left Left 2011    Radiation right      breast       Gynecological History:  Pt is a   She achieved menarche at age: 14 and LMP 50s  Age of Menopause: 50s  Type: natural menopause  She denies any history of hormone replacement therapy   She denies any history of oral contraceptive use   She denies infertility treatment to achieve pregnancy.    Medications:     gabapentin 100 MG Oral Cap Take 1 capsule (100 mg total) by mouth 3 (three) times daily as needed. 270 capsule 0    Omeprazole 40 MG Oral Capsule Delayed Release Take 1 capsule (40 mg total) by mouth daily. 90 capsule 0    amLODIPine 2.5 MG Oral Tab TAKE 1 TABLET BY MOUTH IN THE AM AND 1 TABLET BEFORE BEDTIME. 180 tablet 3    albuterol 108 (90 Base) MCG/ACT Inhalation Aero Soln Inhale 1 puff into the lungs every 4 to 6 hours as needed. FOR ASTHMA. Pharmacist, please switch to formulary alternative per insurance coverage, ok to replace with proair, ventolin, proventil, or any other albuterol inhaler. -Dr. Ellison 1  each 11    atorvastatin 40 MG Oral Tab Take 1 tablet (40 mg total) by mouth nightly. 90 tablet 3    losartan 50 MG Oral Tab Take 1 tablet (50 mg total) by mouth 2 (two) times daily. 180 tablet 3    montelukast 10 MG Oral Tab Take 1 tablet (10 mg total) by mouth nightly. 30 tablet 3    cholecalciferol (VITAMIN D3) 125 MCG (5000 UT) Oral Cap Take 1 capsule (5,000 Units total) by mouth daily. 90 capsule 3    Glucose Blood (TRUE METRIX BLOOD GLUCOSE TEST) In Vitro Strip 1 strip by In Vitro route daily. 100 strip 11    Lancets Does not apply Misc Test once daily 100 each 11    Blood Glucose Monitoring Suppl (TRUE METRIX AIR GLUCOSE METER) w/Device Does not apply Kit Use to test Blood sugar daily 1 kit 0    Calcium Carbonate-Vitamin D 600-200 MG-UNIT Oral Cap Take 1 capsule by mouth every morning.      aspirin 81 MG Oral Tab EC Take by mouth. take 1 tablet (81MG)  by ORAL route  every day      Multiple Vitamin (MULTI-DAY) Oral Tab Take 1 tablet by mouth in the morning.         Allergies:    Allergies[1]    Family History:   Family History   Problem Relation Age of Onset    Heart Disease Mother 77        CAD    Hypertension Mother         mycardial infarction    Musculo-skelatal Disorder Mother         osteoporosis    Heart Disorder Mother     Stroke Father 84    Hypertension Father 82        myocardial infarction    Heart Disease Father 82        CAD    Heart Disorder Father     Breast Cancer Sister 50        chemo; doing fine    Breast Cancer Maternal Aunt 60 - 69        post,  dt metastases    Breast Cancer Self 60    DCIS Self 60       She is not of Ashkenazi Mormonism ancestry.    Social History:  History   Alcohol Use No       History   Smoking Status    Never   Smokeless Tobacco    Never   Ms. Beatriz Fisher Co is  with 0 children. She has 9 siblings. She is currently Employed part-time      Review of Systems:  General:   The patient denies, fever, chills, night sweats, fatigue, generalized weakness, change  in appetite or weight loss.    HEENT:     The patient denies eye irritation, cataracts, redness, glaucoma, yellowing of the eyes, change in vision, color blindness, or wearing contacts/glasses. The patient denies hearing loss, ringing in the ears, ear drainage, earaches, nasal congestion, nose bleeds, +snoring, pain in mouth/throat, hoarseness, change in voice, facial trauma.    Respiratory:  The patient denies +chronic cough, phlegm, hemoptysis, pleurisy/chest pain, pneumonia, asthma, wheezing, difficulty in breathing with exertion, emphysema, chronic bronchitis, shortness of breath or abnormal sound when breathing.     Cardiovascular:  There is no history of chest pain, chest pressure/discomfort, palpitations, irregular heartbeat, fainting or near-fainting, difficulty breathing when lying flat, SOB/Coughing at night, swelling of the legs or chest pain while walking.    Breasts:  See history of present illness    Gastrointestinal:     There is no history of difficulty or pain with swallowing, +reflux symptoms, vomiting, dark or bloody stools, constipation, yellowing of the skin, indigestion, nausea, change in bowel habits, diarrhea, abdominal pain or vomiting blood.     Genitourinary:  The patient denies frequent urination, needing to get up at night to urinate, urinary hesitancy or retaining urine, painful urination, urinary incontinence, decreased urine stream, blood in the urine or vaginal/penile discharge.    Skin:    The patient denies rash, itching, skin lesions, dry skin, change in skin color or change in moles.     Hematologic/Lymphatic:  The patient denies easily bruising or bleeding or persistent swollen glands or lymph nodes.     Musculoskeletal:  The patient denies muscle aches/pain, +joint pain, +stiff joints, neck pain, back pain or bone pain.    Neuropsychiatric:  There is no history of migraines or severe headaches, seizure/epilepsy, speech problems, coordination problems, trembling/tremors,  fainting/black outs, dizziness, memory problems, loss of sensation/numbness, problems walking, weakness, tingling or burning in hands/feet. There is no history of abusive relationship, bipolar disorder, sleep disturbance, anxiety, depression or feeling of despair.    Endocrine:    There is no history of poor/slow wound healing, weight loss/gain, fertility or hormone problems, cold intolerance, thyroid disease.     Allergic/Immunologic:  There is no history of hives, hay fever, angioedema or anaphylaxis.    /73 (BP Location: Left arm, Patient Position: Sitting, Cuff Size: adult)   Pulse 88   Wt 67.6 kg (149 lb)   SpO2 97%   BMI 26.39 kg/m²     Physical Exam:  The patient is an alert, oriented, well-nourished and  well-developed woman who appears her stated age. Her speech patterns and movements are normal. Her affect is appropriate.    HEENT: The head is normocephalic. The neck is supple. The thyroid is not enlarged and is without palpable masses/nodules. There are no palpable masses. The trachea is in the midline. Conjunctiva are clear, non-icteric.    Chest: The chest expands symmetrically. The lungs are clear to auscultation.    Heart: The rhythm is regular.  There are no murmurs, rubs, gallops or thrills.    Breasts:  Her breasts are symmetrical with bra size 40B  Right breast: The skin, nipple ,and areola appear normal. There is no skin dimpling with movement of the pectoralis. There is no nipple retraction. No nipple discharge can be elicited. The parenchyma is mildly nodular. There are no dominant masses in the breast. The axillary tail is normal.  There is a well-healed incision on the inframammary fold with radiation related changes and no palpable concern.  Left breast:   The skin, nipple, and areola appear normal. There is no skin dimpling with movement of the pectoralis. There is no nipple retraction. No nipple discharge can be elicited. The parenchyma is mildly nodular. There are no dominant  masses in the breast. The axillary tail is normal.    Abdomen:  The abdomen is soft, flat and non tender. The liver is not enlarged. There are no palpable masses.    Lymph Nodes:  The supraclavicular, axillary and cervical regions are free of significant lymphadenopathy.    Back: There is no vertebral column tenderness.    Skin: The skin appears normal. There are no suspicious appearing rashes or lesions.    Extremities: The extremities are without deformity, cyanosis or edema.    Impression:   Ms. Beatriz Rosario is a 74 year old woman presents with personal history of right breast DCIS.    Discussion and Plan:  I had a discussion with the Patient regarding her breast exam. On exam today she is healing well since her surgery with no signs of infection.  I personally reviewed the pathology that showed a 4 mm area of DCIS with negative margins.  No further surgical intervention is recommended.   We discussed that the standard of care of a new ipsilateral area of DCIS given a history of lumpectomy with prior radiation would be a completion mastectomy though the patient declined due to possible lengthy recovery and lack of any support system. It was thought that with negative margins the future recurrence risk would be low at approximately 0.5 to 1% risk per year and that a consideration of risk reducing endocrine therapy and or possible repeat radiation course would be reasonable and allow her to have a potentially acceptable low risk of recurrence without a mastectomy.  She does not wish to proceed with repeat radiation therapy but is agreeable to meeting with medical oncology to discuss the role of endocrine therapy.  Will plan for close surveillance with a bilateral diagnostic evaluation in 6 months and clinical exam to follow.  The patient did proceed with genetic testing in March 2025 the results of which were negative.  She was given ample opportunity for questions and those questions were answered to her  satisfaction. She has been  encouraged to contact the office with any questions or concerns prior to her next appointment.     This note was created by [employer] voice recognition. Errors in content may be related to improper recognition by the system; efforts to review and correct have been done but errors may still exist. Please be advised the primary purpose of this note is for me to communicate medical care. Standard sentence structure is not always used. Medical terminology and medical abbreviations may be used. There may be grammatical, typographical, and automated fill ins that may have errors missed in proofreading.       [1]   Allergies  Allergen Reactions    Latex OTHER (SEE COMMENTS)     Redness on the skin with use of latex gloves

## 2025-05-28 NOTE — CONSULTS
HPI   Beatriz Rosario is a 74 year old female here at the request of Zack Ellison MD for evaluation of Ductal carcinoma in situ (DCIS) of right breast.    History of Present Illness  Beatriz Rosario is a 74 year old female with a history of right-sided DCIS of the breast who presents for discussion of prophylactic  endocrine therapy.    She has a history of ductal carcinoma in situ (DCIS) of the right breast, initially diagnosed approximately 20 years ago. At that time, she underwent a lumpectomy followed by radiation therapy and completed five years of chemoprophylaxis with tamoxifen. She experienced vaginal spotting as a side effect of tamoxifen, which led to a hysterectomy due to concerns of postmenopausal bleeding.    Recently, she noted increased pain and pressure in the right breast over the past few months. A bilateral diagnostic mammogram in June 2024 showed concerns in the left breast, but further imaging did not reveal any correlates. An MRI on February 27, 2025, identified a 6 mm enhancing mass in the lower outer right breast, leading to a biopsy on March 10, 2025, which confirmed multifocal DCIS, ER 90% positive. She underwent a right breast lumpectomy with advancement flap mastopexy on May 19, 2025. The final pathology showed focal residual DCIS with negative margins. She is recovering well from surgery with minimal pain and experiences some discomfort with compression bras.    Her family history includes a sister with breast cancer, but there is no known genetic mutation in the family. She underwent genetic testing with a 48-gene panel, which was negative.    She has a history of osteopenia, as shown by a DEXA scan on January 30, 2025, which indicated increased bone mineral density in the lumbar spine and left hip compared to a previous scan in July 2022. She also had a CT calcium score of zero on the same date, although pulmonary nodules were noted, prompting a recommendation for further CT  imaging.    She experiences shortness of breath when climbing three flights of stairs and has a history of right-sided rotator cuff issues, for which she plans to receive a steroid injection. No current nipple discharge, skin changes, or significant weight loss, although her weight fluctuates with her appetite. She reports occasional constipation, which she manages with dietary adjustments.    Her current medications include amlodipine, which she associates with an allergic cough, and montelukast for allergies.      ECOG PS 0    Review of Systems:   Review of Systems - Oncology  10 point review of systems with pertinent positives and negatives outlined in the HPI      Current Medications[1]    Allergies:   Allergies[2]    Past Medical History[3]    Past Surgical History[4]    Short Social Hx on File[5]      Family History[6]      PHYSICAL EXAM:    /67 (BP Location: Left arm, Patient Position: Sitting, Cuff Size: adult)   Pulse 72   Temp 98.2 °F (36.8 °C) (Tympanic)   Resp 16   Ht 1.6 m (5' 3\")   Wt 67.1 kg (148 lb)   SpO2 97%   BMI 26.22 kg/m²   Wt Readings from Last 6 Encounters:   05/28/25 67.1 kg (148 lb)   05/28/25 67.6 kg (149 lb)   05/19/25 67.1 kg (148 lb)   04/26/25 66.7 kg (147 lb)   04/15/25 66.7 kg (147 lb)   04/11/25 67.1 kg (148 lb)     Physical Exam  Physical Exam  GENERAL: Alert, cooperative, well developed, no acute distress.  HEENT: Normocephalic, normal oropharynx, moist mucous membranes.  NECK: No cervical or supraclavicular lymphadenopathy.  CHEST: Clear to auscultation bilaterally, no wheezes, rhonchi, or crackles.  CARDIOVASCULAR: Regular heart rate and rhythm, S1 and S2 normal without murmurs.  BREAST: Right breast with post-surgical swelling, no masses in right breast, post-surgical changes noted, no masses in left breast.  No axillary LAD.  ABDOMEN: Soft, non-tender, non-distended, without organomegaly, normal bowel sounds.  EXTREMITIES: No cyanosis or edema.  MUSCULOSKELETAL:  Limited range of motion in right shoulder due to rotator cuff issue.  NEUROLOGICAL: Cranial nerves grossly intact, moves all extremities without gross motor or sensory deficit.        ASSESSMENT/PLAN:     1. Ductal carcinoma in situ (DCIS) of right breast      Assessment & Plan  Ductal carcinoma in situ (DCIS) of right breast  Recurrent multifocal DCIS of the right breast, initially treated 20 years ago with lumpectomy, radiation, and tamoxifen. Recent treatment included right breast lumpectomy and advancement flap mastopexy, with final pathology showing focal residual DCIS and negative margins.  Patient to be presented at our multidisciplinary tumor board, and decision was not to proceed with mastectomy and to proceed with lumpectomy and adjuvant endocrine therapy.  She had received radiation in the past, and given small size of the DCIS, and proceeding with adjuvant endocrine therapy, recommended to forego further radiation.  Adjuvant endocrine therapy options were discussed. Raloxifene was chosen due to adverse effects with anastrozole and current osteopenia, offering bone density benefits and comparable efficacy to tamoxifen in reducing invasive breast cancer risk. The risk of future recurrence is approximately 0.5 to 1% per year. Raloxifene was preferred over anastrozole due to its bone density benefits and lower cardiovascular risk.  - Prescribe raloxifene with a 30-day supply.  - Schedule follow-up visit in one month with the cancer survivors clinic.  - Plan follow-up visit in six months with mammogram review.    Osteopenia  Osteopenia identified on baseline DEXA scan, influencing the decision to prescribe raloxifene for DCIS due to its potential to improve bone density.    Pulmonary nodules  Pulmonary nodules identified on CT calcium score, unlikely related to DCIS. Follow-up with CT of the chest recommended by pulmonologist Dr. Prado.  - Schedule CT of the chest in July.    Right rotator cuff  problem  Chronic right rotator cuff issue with limited range of motion, previously treated with steroid injections providing three months of relief.  - Contact orthopedic doctor for steroid injection in the right shoulder.    Pancreatic cyst  Pancreatic cyst requiring further evaluation.  - Schedule MRI MRCP for pancreatic cyst evaluation as soon as possible.    Gastrointestinal evaluation  Scheduled endoscopy for further gastrointestinal evaluation.  - Schedule endoscopy on June 18, 2025.    Recording duration: 31 minutes    MDM high risk    The following individual(s) [employer], verbally consented to be recorded using Ambient AI technology and understand that they can withdraw their consent to listening technology at any point by asking the clinician to turn off or pause the recording.     No orders of the defined types were placed in this encounter.      Results From Past 48 Hours:  No results found for this or any previous visit (from the past 48 hours).    Imaging & Referrals:  None   No orders of the defined types were placed in this encounter.    Results  RADIOLOGY  Bilateral diagnostic mammogram: No imaging correlate in the left breast (06/2024)  Breast MRI: 6 mm enhancing mass in the lower outer right breast (02/27/2025)  CT calcium score: Pulmonary nodules: pleural base nodular lesion 4x9 mm, peripheral micronodules 3 mm, lingular nodule 3x4 mm, calcium score 0 (01/30/2025)  CT abdomen pelvis: Negative except for moderate diffuse stool in the colon (05/01/2025)    DIAGNOSTIC  DEXA scan: Osteopenia, increased bone density in lumbar spine and left hip compared to 07/09/2022 (01/30/2025)    PATHOLOGY  Breast biopsy: Multifocal ductal carcinoma in situ, ER 90% positive (03/10/2025)  Right breast lumpectomy pathology: Focal residual DCIS, all margins negative, residual DCIS 4 mm (05/19/2025)               [1]   Current Outpatient Medications   Medication Sig Dispense Refill    gabapentin 100 MG Oral Cap  Take 1 capsule (100 mg total) by mouth 3 (three) times daily as needed. 270 capsule 0    Omeprazole 40 MG Oral Capsule Delayed Release Take 1 capsule (40 mg total) by mouth daily. 90 capsule 0    amLODIPine 2.5 MG Oral Tab TAKE 1 TABLET BY MOUTH IN THE AM AND 1 TABLET BEFORE BEDTIME. 180 tablet 3    albuterol 108 (90 Base) MCG/ACT Inhalation Aero Soln Inhale 1 puff into the lungs every 4 to 6 hours as needed. FOR ASTHMA. Pharmacist, please switch to formulary alternative per insurance coverage, ok to replace with proair, ventolin, proventil, or any other albuterol inhaler. -Dr. Ellison 1 each 11    atorvastatin 40 MG Oral Tab Take 1 tablet (40 mg total) by mouth nightly. 90 tablet 3    losartan 50 MG Oral Tab Take 1 tablet (50 mg total) by mouth 2 (two) times daily. 180 tablet 3    montelukast 10 MG Oral Tab Take 1 tablet (10 mg total) by mouth nightly. 30 tablet 3    cholecalciferol (VITAMIN D3) 125 MCG (5000 UT) Oral Cap Take 1 capsule (5,000 Units total) by mouth daily. 90 capsule 3    Glucose Blood (TRUE METRIX BLOOD GLUCOSE TEST) In Vitro Strip 1 strip by In Vitro route daily. 100 strip 11    Lancets Does not apply Misc Test once daily 100 each 11    Blood Glucose Monitoring Suppl (TRUE METRIX AIR GLUCOSE METER) w/Device Does not apply Kit Use to test Blood sugar daily 1 kit 0    Calcium Carbonate-Vitamin D 600-200 MG-UNIT Oral Cap Take 1 capsule by mouth every morning.      aspirin 81 MG Oral Tab EC Take by mouth. take 1 tablet (81MG)  by ORAL route  every day      Multiple Vitamin (MULTI-DAY) Oral Tab Take 1 tablet by mouth in the morning.     [2]   Allergies  Allergen Reactions    Latex OTHER (SEE COMMENTS)     Redness on the skin with use of latex gloves   [3]   Past Medical History:   Allergic rhinitis    BRCA gene mutation negative in female    Negative 48 gene hereditary cancer panel ([employer] common hereditary cancers + RNA panel), report in media tab    Calculus of kidney    Cataracts, bilateral     Diabetes (HCC)    Diabetes mellitus, type II (HCC)    Ductal carcinoma in situ of right breast    Endometrial thickening on ultra sound    Esophageal reflux    Exposure to medical diagnostic radiation    Glaucoma, right eye    High blood pressure    High cholesterol    Lumbar arthropathy    Osteoarthritis    Other and unspecified hyperlipidemia    Personal history of antineoplastic chemotherapy    oral chemotx    Positive PPD    S/P INH prophylaxis for 6 months     Unspecified essential hypertension    Visual impairment    readers    Vitamin B12 deficiency anemia    Vitamin D deficiency   [4]   Past Surgical History:  Procedure Laterality Date    Biopsy of breast, needle core Bilateral 2011    [place] Right 2001    [place]    Colonoscopy  2008    Colonoscopy  06/2021    Colonoscopy N/A 06/25/2021    Procedure: COLONOSCOPY,;  Surgeon: Dirk Robbins MD;  Location: [facility], [facility]    Hysterectomy  2016    Laparoscopy,vag hysterectomy  09/26/2016    w/ BSO, benign    Lumpectomy right  08/06/2010    Mri breast biopsy w/ clip 1 site right (cpt=19085)  03/10/2025    Needle biopsy left Left 08/12/2011    Radiation right  2011    breast   [5]   Social History  Socioeconomic History    Marital status:     Number of children: 0   Occupational History    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     Employer: [employer]   Tobacco Use    Smoking status: Never     Passive exposure: Never    Smokeless tobacco: Never   Vaping Use    Vaping status: Never Used   Substance and Sexual Activity    Alcohol use: No     Alcohol/week: 0.0 standard drinks of alcohol    Drug use: No   Other Topics Concern    Caffeine Concern Yes     Comment: 2 cups of coffee per day      Social Drivers of Health      Received from [employer]    [facility]   [6]   Family History  Problem Relation Age of Onset    Heart Disease Mother 77        CAD    Hypertension Mother         mycardial infarction     Musculo-skelatal Disorder Mother         osteoporosis    Heart Disorder Mother     Stroke Father 84    Hypertension Father 82        myocardial infarction    Heart Disease Father 82        CAD    Heart Disorder Father     Breast Cancer Sister 50        chemo; doing fine    Breast Cancer Maternal Aunt 60 - 69        post,  dt metastases    Breast Cancer Self 60    DCIS Self 60

## 2025-06-18 ENCOUNTER — ANESTHESIA (OUTPATIENT)
Dept: ENDOSCOPY | Facility: HOSPITAL | Age: 75
End: 2025-06-18
Payer: MEDICARE

## 2025-06-18 ENCOUNTER — ANESTHESIA EVENT (OUTPATIENT)
Dept: ENDOSCOPY | Facility: HOSPITAL | Age: 75
End: 2025-06-18
Payer: MEDICARE

## 2025-06-18 ENCOUNTER — HOSPITAL ENCOUNTER (OUTPATIENT)
Facility: HOSPITAL | Age: 75
Setting detail: HOSPITAL OUTPATIENT SURGERY
Discharge: HOME | End: 2025-06-18
Attending: STUDENT IN AN ORGANIZED HEALTH CARE EDUCATION/TRAINING PROGRAM | Admitting: STUDENT IN AN ORGANIZED HEALTH CARE EDUCATION/TRAINING PROGRAM
Payer: MEDICARE

## 2025-06-18 VITALS
HEIGHT: 63 IN | WEIGHT: 148 LBS | SYSTOLIC BLOOD PRESSURE: 107 MMHG | OXYGEN SATURATION: 95 % | HEART RATE: 69 BPM | DIASTOLIC BLOOD PRESSURE: 62 MMHG | RESPIRATION RATE: 18 BRPM | BODY MASS INDEX: 26.22 KG/M2

## 2025-06-18 DIAGNOSIS — R05.9 COUGH, UNSPECIFIED TYPE: ICD-10-CM

## 2025-06-18 DIAGNOSIS — K21.9 GASTROESOPHAGEAL REFLUX DISEASE, UNSPECIFIED WHETHER ESOPHAGITIS PRESENT: ICD-10-CM

## 2025-06-18 PROCEDURE — 43239 EGD BIOPSY SINGLE/MULTIPLE: CPT | Performed by: STUDENT IN AN ORGANIZED HEALTH CARE EDUCATION/TRAINING PROGRAM

## 2025-06-18 RX ORDER — LIDOCAINE HYDROCHLORIDE 10 MG/ML
INJECTION, SOLUTION EPIDURAL; INFILTRATION; INTRACAUDAL; PERINEURAL AS NEEDED
Status: DISCONTINUED | OUTPATIENT
Start: 2025-06-18 | End: 2025-06-18 | Stop reason: SURG

## 2025-06-18 RX ORDER — SODIUM CHLORIDE, SODIUM LACTATE, POTASSIUM CHLORIDE, CALCIUM CHLORIDE 600; 310; 30; 20 MG/100ML; MG/100ML; MG/100ML; MG/100ML
INJECTION, SOLUTION INTRAVENOUS CONTINUOUS
Status: DISCONTINUED | OUTPATIENT
Start: 2025-06-18 | End: 2025-06-18

## 2025-06-18 RX ORDER — NALOXONE HYDROCHLORIDE 0.4 MG/ML
0.08 INJECTION, SOLUTION INTRAMUSCULAR; INTRAVENOUS; SUBCUTANEOUS ONCE AS NEEDED
Status: DISCONTINUED | OUTPATIENT
Start: 2025-06-18 | End: 2025-06-18

## 2025-06-18 RX ADMIN — LIDOCAINE HYDROCHLORIDE 50 MG: 10 INJECTION, SOLUTION EPIDURAL; INFILTRATION; INTRACAUDAL; PERINEURAL at 10:42:00

## 2025-06-18 RX ADMIN — SODIUM CHLORIDE, SODIUM LACTATE, POTASSIUM CHLORIDE, CALCIUM CHLORIDE: 600; 310; 30; 20 INJECTION, SOLUTION INTRAVENOUS at 10:40:00

## 2025-06-18 NOTE — OPERATIVE REPORT
ESOPHAGOGASTRODUODENOSCOPY (EGD) REPORT    Beatriz Rosario     1950 Age 75 year old   PCP Zack Ellison MD Endoscopist Marcos Sloan MD       Date of procedure: 25    Procedure: EGD w/ biopsies    Pre-operative diagnosis: cough, gerd    Post-operative diagnosis: see impression    Medications: MAC    Complications: none    Procedure:  Informed consent was obtained from the patient after the risks of the procedure were discussed, including but not limited to bleeding, perforation, aspiration, infection, or possibility of a missed lesion. After discussions of the risks/benefits and alternatives to this procedure, as well as the planned sedation, the patient was placed in the left lateral decubitus position and begun on continuous blood pressure pulse oximetry and EKG monitoring and this was maintained throughout the procedure. Once an adequate level of sedation was obtained a bite block was placed. Then the lubricated tip of the [id number] diagnostic video upper endoscope was inserted and advanced using direct visualization into the posterior pharynx and ultimately into the esophagus. The scope was then advanced through the stomach and to the second portion of the duodenum.    Complications: None    Findings:      1. Esophagus: The squamocolumnar junction was noted at 38 cm and appeared regular. The diaphragmatic pinch was noted noted at 38 cm from the incisors. The esophageal mucosa appeared healthy and normal. There was no endoscopic findings of esophagitis, stricture or Elizondo's esophagus.    2. Stomach: The stomach distended normally. Normal rugal folds were seen. The pylorus was patent. Retroflexion revealed a normal fundus. The gastric mucosa appeared healthy and normal. Biopsies were taken with a cold forceps from the antrum, incisura and body for histology.     3. Duodenum: The duodenal mucosa appeared normal in the bulb and 2nd portion of the duodenum.     Impression:  -Esophagus:  Unremarkable, normal appearing esophagus.  -Stomach: Unremarkable, no ulcers. Biopsied.  -Duodenum: Unremarkable examined portion.  -No evidence of esophagitis, do not suspect intermittent cough/throat clearing to be related to gastroesophageal reflux disease.    Recommend:  1. Await pathology.    >>>If tissue was sampled/removed and you have not received your pathology results either by phone or letter within 2 weeks, please call our office at [phone].    Specimens:  Gastric.    Blood loss: <1 ml

## 2025-06-18 NOTE — H&P
History & Physical Examination    Patient Name: Beatriz Rosario  MRN: [id number]  CSN: [id number]  YOB: 1950    Diagnosis: cough    Prescriptions Prior to Admission[1]  Current Hospital Medications[2]    Allergies: Allergies[3]    Past Medical History[4]  Past Surgical History[5]  Family History[6]  Social History     Tobacco Use    Smoking status: Never     Passive exposure: Never    Smokeless tobacco: Never   Substance Use Topics    Alcohol use: Not Currently       SYSTEM Check if Review is Normal Check if Physical Exam is Normal If not normal, please explain:   HEENT [X ] [ X]    NECK  [X ] [ X]    HEART [X ] [ X]    LUNGS [X ] [ X]    ABDOMEN [X ] [ X]    EXTREMITIES [X ] [ X]    OTHER        I have discussed the risks and benefits and alternatives of the procedure with the patient/family.  They understand and agree to proceed with plan of care.   I have reviewed the History and Physical done within the last 30 days.  Any changes noted above.    Marcos Sloan MD  [facility]                   [1]   Medications Prior to Admission   Medication Sig Dispense Refill Last Dose/Taking    PANTOPRAZOLE SODIUM OR Take by mouth daily.   6/16/2025    raloxifene 60 MG Oral Tab Take 1 tablet (60 mg total) by mouth daily. 30 tablet 6 6/17/2025    amLODIPine 2.5 MG Oral Tab TAKE 1 TABLET BY MOUTH IN THE AM AND 1 TABLET BEFORE BEDTIME. 180 tablet 3 6/17/2025    atorvastatin 40 MG Oral Tab Take 1 tablet (40 mg total) by mouth nightly. 90 tablet 3 6/17/2025    losartan 50 MG Oral Tab Take 1 tablet (50 mg total) by mouth 2 (two) times daily. 180 tablet 3 6/17/2025    montelukast 10 MG Oral Tab Take 1 tablet (10 mg total) by mouth nightly. 30 tablet 3 6/17/2025    cholecalciferol (VITAMIN D3) 125 MCG (5000 UT) Oral Cap Take 1 capsule (5,000 Units total) by mouth daily. 90 capsule 3 6/17/2025    Calcium Carbonate-Vitamin D 600-200 MG-UNIT Oral Cap Take 1 capsule by mouth every morning.   Taking     Multiple Vitamin (MULTI-DAY) Oral Tab Take 1 tablet by mouth in the morning.   6/17/2025    Glucose Blood (TRUE METRIX BLOOD GLUCOSE TEST) In Vitro Strip 1 strip by In Vitro route daily. 100 strip 11     Lancets Does not apply Misc Test once daily 100 each 11     Blood Glucose Monitoring Suppl (TRUE METRIX AIR GLUCOSE METER) w/Device Does not apply Kit Use to test Blood sugar daily 1 kit 0     aspirin 81 MG Oral Tab EC Take by mouth. take 1 tablet (81MG)  by ORAL route  every day   6/11/2025   [2]   Current Facility-Administered Medications   Medication Dose Route Frequency    lactated ringers infusion   Intravenous Continuous   [3]   Allergies  Allergen Reactions    Latex OTHER (SEE COMMENTS)     Redness on the skin with use of latex gloves   [4]   Past Medical History:   Allergic rhinitis    BRCA gene mutation negative in female    Negative 48 gene hereditary cancer panel ([employer] common hereditary cancers + RNA panel), report in media tab    Calculus of kidney    Cataracts, bilateral    Diabetes (HCC)    Diabetes mellitus, type II (HCC)    Ductal carcinoma in situ of right breast    Endometrial thickening on ultra sound    Esophageal reflux    Exposure to medical diagnostic radiation    Glaucoma, right eye    High blood pressure    High cholesterol    Lumbar arthropathy    Osteoarthritis    Other and unspecified hyperlipidemia    Personal history of antineoplastic chemotherapy    oral chemotx    Positive PPD    S/P INH prophylaxis for 6 months     Unspecified essential hypertension    Vitamin B12 deficiency anemia    Vitamin D deficiency   [5]   Past Surgical History:  Procedure Laterality Date    Biopsy of breast, needle core Bilateral 2011    Breast biopsy Right 2001    [facility]    Colonoscopy  2008    Colonoscopy  06/2021    Colonoscopy N/A 06/25/2021    Procedure: COLONOSCOPY,;  Surgeon: Dirk Robbins MD;  Location: [facility], [facility]    Hysterectomy  2016    Laparoscopy,vag hysterectomy   2016    w/ BSO, benign    Lumpectomy right  2010    Mri breast biopsy w/ clip 1 site right (cpt=19085)  03/10/2025    Needle biopsy left Left 2011    Radiation right  2011    breast   [6]   Family History  Problem Relation Age of Onset    Heart Disease Mother 77        CAD    Hypertension Mother         mycardial infarction    Musculo-skelatal Disorder Mother         osteoporosis    Heart Disorder Mother     Stroke Father 84    Hypertension Father 82        myocardial infarction    Heart Disease Father 82        CAD    Heart Disorder Father     Breast Cancer Sister 50        chemo; doing fine    Breast Cancer Maternal Aunt 60 - 69        post,  dt metastases    Breast Cancer Self 60    DCIS Self 60

## 2025-06-18 NOTE — ANESTHESIA POSTPROCEDURE EVALUATION
Patient: Beatriz [employer]    Procedure Summary       Date: 06/18/25 Room / Location: [facility] ENDOSCOPY 03 / [facility] ENDOSCOPY    Anesthesia Start: 1042 Anesthesia Stop: 1051    Procedure: ESOPHAGOGASTRODUODENOSCOPY Diagnosis:       Gastroesophageal reflux disease, unspecified whether esophagitis present      Cough, unspecified type      (normal egd)    Surgeons: Marcos Sloan MD Anesthesiologist: Evelina Forde MD    Anesthesia Type: MAC ASA Status: 3            Anesthesia Type: No value filed.    Vitals Value Taken Time   /66 06/18/25 11:06   Temp  06/18/25 11:06   Pulse 66 06/18/25 11:06   Resp 14 06/18/25 11:06   SpO2 100 06/18/25 11:06       EM AN Post Evaluation:   Patient Evaluated in PACU  Level of Consciousness: awake  Pain Score: 0  Pain Management: adequate  Airway Patency:patent  Dental exam unchanged from preop  Yes    Nausea/Vomiting: none  Cardiovascular Status: acceptable  Respiratory Status: acceptable  Postoperative Hydration acceptable      Evelina Forde MD  6/18/2025 11:06 AM

## 2025-06-18 NOTE — DISCHARGE INSTRUCTIONS
Home Care Instructions for Gastroscopy with Sedation    Diet:  - Resume your regular diet as tolerated unless otherwise instructed.  - Start with light meals to minimize bloating.  - Do not drink alcohol today.    Medication:  - If you have questions about resuming your normal medications, please contact your Primary Care Physician.    Activities:  - Take it easy today. Do not return to work today.  - Do not drive today.  - Do not operate any machinery today (including kitchen equipment).    Gastroscopy:  - You may have a sore throat for 2-3 days following the exam. This is normal. Gargling with warm salt water (1/2 tsp salt to 1 glass warm water) or using throat lozenges will help.  - If you experience any sharp pain in your neck, abdomen or chest, vomiting of blood, oral temperature over 100 degrees Fahrenheit, light-headedness or dizziness, or any other problems, contact your doctor.    **If unable to reach your doctor, please go to the [facility] Emergency Room**    - Your referring physician will receive a full report of your examination.  - If you do not hear from your doctor's office within two weeks of your biopsy, please call them for your results.    You may be able to see your laboratory results in [facility] between 4 and 7 business days.  In some cases, your physician may not have viewed the results before they are released to [facility].  If you have questions regarding your results contact the physician who ordered the test/exam by phone or via [facility] by choosing \"Ask a Medical Question.\"

## 2025-06-18 NOTE — ANESTHESIA PREPROCEDURE EVALUATION
Anesthesia PreOp Note    HPI:     Beatriz Rosario is a 75 year old female who presents for preoperative consultation requested by: Marcos Sloan MD    Date of Surgery: 6/18/2025    Procedure(s):  ESOPHAGOGASTRODUODENOSCOPY  Indication: Gastroesophageal reflux disease, unspecified whether esophagitis present /Cough, unspecified type    Relevant Problems   No relevant active problems       NPO:                         History Review:  Patient Active Problem List    Diagnosis Date Noted    Breast neoplasm, Tis (DCIS), right 05/28/2025    Lung nodules 04/26/2025    Ductal carcinoma in situ (DCIS) of right breast 04/11/2025    BRCA gene mutation negative in female 03/2025    PAD (peripheral artery disease) 01/06/2025    History of ductal carcinoma in situ (DCIS) of right breast 11/27/2024    Chronic cough 06/10/2024    Age-related osteoporosis without current pathological fracture 06/10/2024    Pancreatic cyst (HCC) 06/10/2024    Medicare annual wellness visit, subsequent 06/10/2024    Hyperlipidemia associated with type 2 diabetes mellitus (HCC) 04/07/2019    Colon cancer screening 11/02/2018    Atherosclerosis of aorta 06/12/2018    Glaucoma 11/14/2016    Type 2 diabetes mellitus without complication, without long-term current use of insulin (HCC) 04/27/2012    Vitamin D deficiency 10/31/2011    Allergic rhinitis 03/15/2011    Hypertension associated with type 2 diabetes mellitus (HCC) 01/03/2008       Past Medical History[1]    Past Surgical History[2]    Prescriptions Prior to Admission[3]  Current Medications and Prescriptions Ordered in Epic[4]    Allergies[5]    Family History[6]  Social Hx on file[7]    Available pre-op labs reviewed.  Lab Results   Component Value Date    WBC 7.9 05/14/2025    RBC 3.90 05/14/2025    HGB 11.7 (L) 05/14/2025    HCT 36.4 05/14/2025    MCV 93.3 05/14/2025    MCH 30.0 05/14/2025    MCHC 32.1 05/14/2025    RDW 15.0 05/14/2025    .0 05/14/2025     Lab Results    Component Value Date     05/14/2025    K 4.0 05/14/2025     05/14/2025    CO2 29.0 05/14/2025    BUN 18 05/14/2025    CREATSERUM 0.97 05/14/2025    GLU 92 05/14/2025    PGLU 106 (H) 05/19/2025    CA 9.2 05/14/2025          Vital Signs:  Body mass index is 26.22 kg/m².   height is 1.6 m (5' 3\") and weight is 67.1 kg (148 lb).   Vitals:    06/13/25 1443   Weight: 67.1 kg (148 lb)   Height: 1.6 m (5' 3\")        Anesthesia Evaluation     Patient summary reviewed and Nursing notes reviewed    Airway   Dental      Pulmonary    Cardiovascular   (+) hypertension    Neuro/Psych      GI/Hepatic/Renal    (+) GERD    Endo/Other    (+) diabetes mellitus    Comments: Breast cancer  Abdominal                  Anesthesia Plan:   ASA:  3  Plan:   MAC  Informed Consent Plan and Risks Discussed With:  Patient  Discussed plan with:  Attending      I have informed Beatriz Hooperdiogojuan Co and/or legal guardian or family member of the nature of the anesthetic plan, benefits, risks including possible dental damage if relevant, major complications, and any alternative forms of anesthetic management.   All of the patient's questions were answered to the best of my ability. The patient desires the anesthetic management as planned.  Evelina Forde MD  6/18/2025 9:10 AM  Present on Admission:  **None**           [1]   Past Medical History:   Allergic rhinitis    BRCA gene mutation negative in female    Negative 48 gene hereditary cancer panel ([employer] common hereditary cancers + RNA panel), report in media tab    Calculus of kidney    Cataracts, bilateral    Diabetes (HCC)    Diabetes mellitus, type II (HCC)    Ductal carcinoma in situ of right breast    Endometrial thickening on ultra sound    Esophageal reflux    Exposure to medical diagnostic radiation    Glaucoma, right eye    High blood pressure    High cholesterol    Lumbar arthropathy    Osteoarthritis    Other and unspecified hyperlipidemia    Personal history of antineoplastic  chemotherapy    oral chemotx    Positive PPD    S/P INH prophylaxis for 6 months     Unspecified essential hypertension    Vitamin B12 deficiency anemia    Vitamin D deficiency   [2]   Past Surgical History:  Procedure Laterality Date    Biopsy of breast, needle core Bilateral 2011    Breast biopsy Right 2001    [facility]    Colonoscopy  2008    Colonoscopy  06/2021    Colonoscopy N/A 06/25/2021    Procedure: COLONOSCOPY,;  Surgeon: Dirk Robbins MD;  Location: [facility], [facility]    Hysterectomy  2016    Laparoscopy,vag hysterectomy  09/26/2016    w/ BSO, benign    Lumpectomy right  08/06/2010    Mri breast biopsy w/ clip 1 site right (cpt=19085)  03/10/2025    Needle biopsy left Left 08/12/2011    Radiation right  2011    breast   [3]   Medications Prior to Admission   Medication Sig Dispense Refill Last Dose/Taking    PANTOPRAZOLE SODIUM OR Take by mouth daily.   Taking    raloxifene 60 MG Oral Tab Take 1 tablet (60 mg total) by mouth daily. 30 tablet 6 Taking    amLODIPine 2.5 MG Oral Tab TAKE 1 TABLET BY MOUTH IN THE AM AND 1 TABLET BEFORE BEDTIME. 180 tablet 3 Taking    atorvastatin 40 MG Oral Tab Take 1 tablet (40 mg total) by mouth nightly. 90 tablet 3 Taking    losartan 50 MG Oral Tab Take 1 tablet (50 mg total) by mouth 2 (two) times daily. 180 tablet 3 Taking    montelukast 10 MG Oral Tab Take 1 tablet (10 mg total) by mouth nightly. 30 tablet 3 Taking    cholecalciferol (VITAMIN D3) 125 MCG (5000 UT) Oral Cap Take 1 capsule (5,000 Units total) by mouth daily. 90 capsule 3 Taking    Calcium Carbonate-Vitamin D 600-200 MG-UNIT Oral Cap Take 1 capsule by mouth every morning.   Taking    Multiple Vitamin (MULTI-DAY) Oral Tab Take 1 tablet by mouth in the morning.   Taking    Glucose Blood (TRUE METRIX BLOOD GLUCOSE TEST) In Vitro Strip 1 strip by In Vitro route daily. 100 strip 11     Lancets Does not apply Misc Test once daily 100 each 11     Blood Glucose Monitoring Suppl (TRUE METRIX AIR  GLUCOSE METER) w/Device Does not apply Kit Use to test Blood sugar daily 1 kit 0     aspirin 81 MG Oral Tab EC Take by mouth. take 1 tablet (81MG)  by ORAL route  every day      [4]   No current Epic-ordered facility-administered medications on file.     No current Epic-ordered outpatient medications on file.   [5]   Allergies  Allergen Reactions    Latex OTHER (SEE COMMENTS)     Redness on the skin with use of latex gloves   [6]   Family History  Problem Relation Age of Onset    Heart Disease Mother 77        CAD    Hypertension Mother         mycardial infarction    Musculo-skelatal Disorder Mother         osteoporosis    Heart Disorder Mother     Stroke Father 84    Hypertension Father 82        myocardial infarction    Heart Disease Father 82        CAD    Heart Disorder Father     Breast Cancer Sister 50        chemo; doing fine    Breast Cancer Maternal Aunt 60 - 69        post,  dt metastases    Breast Cancer Self 60    DCIS Self 60   [7]   Social History  Socioeconomic History    Marital status:     Number of children: 0   Occupational History    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     Employer: [employer]   Tobacco Use    Smoking status: Never     Passive exposure: Never    Smokeless tobacco: Never   Vaping Use    Vaping status: Never Used   Substance and Sexual Activity    Alcohol use: Not Currently    Drug use: No   Other Topics Concern    Caffeine Concern Yes     Comment: 2 cups of coffee per day

## 2025-07-01 ENCOUNTER — OFFICE VISIT (OUTPATIENT)
Age: 75
End: 2025-07-01
Attending: STUDENT IN AN ORGANIZED HEALTH CARE EDUCATION/TRAINING PROGRAM
Payer: MEDICARE

## 2025-07-01 DIAGNOSIS — Z71.9 COUNSELING, UNSPECIFIED: ICD-10-CM

## 2025-07-01 DIAGNOSIS — Z08 ENCOUNTER FOR FOLLOW-UP EXAMINATION AFTER COMPLETED TREATMENT FOR MALIGNANT NEOPLASM: ICD-10-CM

## 2025-07-01 DIAGNOSIS — D05.11 DUCTAL CARCINOMA IN SITU (DCIS) OF RIGHT BREAST: Primary | ICD-10-CM

## 2025-07-01 NOTE — PROGRESS NOTES
I met with Beatriz for a Survivorship Clinic visit to provide a survivorship care plan (SCP) and information related to post-treatment care. She is a 74 year old female with a history of ductal carcinoma in situ (DCIS) of the right breast, initially diagnosed approximately 20 years ago. At that time, she underwent a lumpectomy followed by radiation therapy and completed five years of chemoprophylaxis with Tamoxifen. She experienced vaginal spotting as a side effect of Tamoxifen, which led to a total hysterectomy due to concerns of postmenopausal bleeding.     A bilateral diagnostic mammogram in 6/24 showed concerns in the left breast, but further imaging did not reveal any correlates. An MRI on 2/27/25, identified a 6 mm enhancing mass in the lower outer right breast, leading to a biopsy on 3/10/25, which confirmed multifocal DCIS, ER 90% positive. On 5/19/25 Dr. Licha Lind performed a right breast lumpectomy with advancement flap mastopexy. The final pathology showed focal residual DCIS with negative margins. She was seen by Dr. Veronica Gramajo who recommended endocrine therapy with Raloxifene, given her baseline osteopenia which she started in 6/25. (See Oncology Treatment Summary SCP for details).      Current condition/issues: Beatriz has been taking Raloxifene for one month. She noted leg cramps initially that resolved. She has noted mild bilateral LE edema that she has had before but it is more consistent. She worries about the risk of blood clots and is traveling to the [facility] in August. She takes Raloxifene each AM. She elevates her legs when she can. She notes soreness in her right breast, worries she is developing a keloid, but does think the soreness is improving. She was encouraged to contact Dr. Lind's office if this worsens or she wants this evaluated.     She denies anxiety or depression and has good support from friends. All of her family is in the [facility] and she plans to visit them in  August for a few weeks. She eats healthy most days and would like to lose a few pounds. Current BMI is 26. She tries to walk 2-3 times/week for 30\" and does some stretching. She has a treadmill and weights in her home that she has not used. We reviewed tracking steps, increased walking, weight bearing benefits. 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    She had recent EGD on 6/19/25 with Dr. Marcos Sloan to evaluate GERD symptoms and it was normal. She continues to take Pantoprazole prn.     Survivorship Care Plan and letter: Beatriz was provided a hard copy of the SCP and a letter that outlined the purpose of the visit and plan.  We reviewed all elements of both documents. She is aware that a letter and SCP will be sent to her PCP, Dr. Zack Ellison.     Reviewed Cancer Surveillance and that Dr. Gramajo will oversee this care.  She will see her next on 11/28/25. She should get bilateral mammogram in November, then should schedule to see Dr. Lind.      Reviewed Schedule of other clinic visits with Primary Care and specialists: Dr. Ellison will continue to manage all general health care recommended for age and gender including cancer screening tests.  She is up-to-date with screening. Ongoing medical comorbidities will continue to be managed by PCP and specialists. She is scheduled for CT chest on 7/29/25 to evaluate prior lung nodule and will discuss with Dr. Jerrod Prado. She has order from Dr. Marcos Sloan for MRCP to evaluate pancreas cyst.       Reviewed concerning symptoms that she should report to any Provider.      Reviewed possible late and long-term effects related to the treatment that was received.  We reviewed care of the surgical arm and management of hormone related side effects.  We reviewed impact of body image.     Reviewed common cancer survivor issues and resources available.  We reviewed exercise, nutrition, weight loss options, psychosocial support and survivorship programs.      Reviewed Lifestyle/behaviors that can affect ongoing health, including the risk for the cancer coming back or developing another cancer.  We reviewed importance of physical activity including aerobic, strength training and weight bearing exercise.  We reviewed a plant based diet.  We reviewed skin care and sun safety. She drinks no alcohol and is a non-smoker.     The patient received a take-home folder that included the following survivorship resources:   -SCP and patient letter  -Breast Survivorship Guide   -[facility]-education, support groups, special programs, nutrition and exercise classes, movement classes, mind/body programs, survivorship programs, individual counseling  -[facility]  -[email or web address] handout-nutrition, physical activity  -ACS Cancer Screening Guidelines  -Websites: American Cancer Society, Living Beyond Breast Cancer, Facebook: [facility], Tory Kolori, Endocrine Therapies, [employer] for your Life     The patient was given the opportunity to ask questions.  She had a few questions and verbalized understanding of the information we discussed today. Emotional support was provided. My total time spent caring for the patient on the day of the encounter: 60 minutes of face to face counseling regarding survivorship education, review of care plan and additional resources.  She was encouraged to call with any further questions.   [employer]

## 2025-07-15 ENCOUNTER — ORDER TRANSCRIPTION (OUTPATIENT)
Dept: SLEEP CENTER | Age: 75
End: 2025-07-15

## 2025-07-15 ENCOUNTER — TELEPHONE (OUTPATIENT)
Dept: SLEEP CENTER | Age: 75
End: 2025-07-15

## 2025-07-15 DIAGNOSIS — I15.2 HYPERTENSION ASSOCIATED WITH TYPE 2 DIABETES MELLITUS (HCC): ICD-10-CM

## 2025-07-15 DIAGNOSIS — R53.83 FATIGUE: Primary | ICD-10-CM

## 2025-07-15 DIAGNOSIS — E11.59 HYPERTENSION ASSOCIATED WITH TYPE 2 DIABETES MELLITUS (HCC): ICD-10-CM

## 2025-07-16 ENCOUNTER — OFFICE VISIT (OUTPATIENT)
Dept: INTERNAL MEDICINE CLINIC | Facility: CLINIC | Age: 75
End: 2025-07-16

## 2025-07-16 ENCOUNTER — LAB ENCOUNTER (OUTPATIENT)
Dept: LAB | Age: 75
End: 2025-07-16
Attending: INTERNAL MEDICINE
Payer: MEDICARE

## 2025-07-16 VITALS — DIASTOLIC BLOOD PRESSURE: 68 MMHG | SYSTOLIC BLOOD PRESSURE: 115 MMHG | HEART RATE: 76 BPM

## 2025-07-16 DIAGNOSIS — J30.89 ENVIRONMENTAL AND SEASONAL ALLERGIES: ICD-10-CM

## 2025-07-16 DIAGNOSIS — E11.69 HYPERLIPIDEMIA ASSOCIATED WITH TYPE 2 DIABETES MELLITUS (HCC): ICD-10-CM

## 2025-07-16 DIAGNOSIS — K21.00 GASTROESOPHAGEAL REFLUX DISEASE WITH ESOPHAGITIS WITHOUT HEMORRHAGE: ICD-10-CM

## 2025-07-16 DIAGNOSIS — E11.59 HYPERTENSION ASSOCIATED WITH TYPE 2 DIABETES MELLITUS (HCC): ICD-10-CM

## 2025-07-16 DIAGNOSIS — H40.2294: ICD-10-CM

## 2025-07-16 DIAGNOSIS — E11.9 TYPE 2 DIABETES MELLITUS WITHOUT COMPLICATION, WITHOUT LONG-TERM CURRENT USE OF INSULIN (HCC): ICD-10-CM

## 2025-07-16 DIAGNOSIS — E78.5 HYPERLIPIDEMIA ASSOCIATED WITH TYPE 2 DIABETES MELLITUS (HCC): ICD-10-CM

## 2025-07-16 DIAGNOSIS — E11.9 TYPE 2 DIABETES MELLITUS WITHOUT COMPLICATION, WITHOUT LONG-TERM CURRENT USE OF INSULIN (HCC): Primary | ICD-10-CM

## 2025-07-16 DIAGNOSIS — I15.2 HYPERTENSION ASSOCIATED WITH TYPE 2 DIABETES MELLITUS (HCC): ICD-10-CM

## 2025-07-16 DIAGNOSIS — Z86.000 HISTORY OF DUCTAL CARCINOMA IN SITU (DCIS) OF RIGHT BREAST: ICD-10-CM

## 2025-07-16 LAB
ALBUMIN SERPL-MCNC: 5 G/DL (ref 3.2–4.8)
ALBUMIN/GLOB SERPL: 2.6 {RATIO} (ref 1–2)
ALP LIVER SERPL-CCNC: 71 U/L (ref 55–142)
ALT SERPL-CCNC: 20 U/L (ref 10–49)
ANION GAP SERPL CALC-SCNC: 11 MMOL/L (ref 0–18)
AST SERPL-CCNC: 24 U/L (ref ?–34)
BASOPHILS # BLD AUTO: 0.06 X10(3) UL (ref 0–0.2)
BASOPHILS NFR BLD AUTO: 0.9 %
BILIRUB SERPL-MCNC: 0.6 MG/DL (ref 0.2–1.1)
BUN BLD-MCNC: 18 MG/DL (ref 9–23)
BUN/CREAT SERPL: 26.1 (ref 10–20)
CALCIUM BLD-MCNC: 9.3 MG/DL (ref 8.7–10.4)
CARTRIDGE EXPIRATION DATE: ABNORMAL DATE
CHLORIDE SERPL-SCNC: 104 MMOL/L (ref 98–112)
CHOLEST SERPL-MCNC: 121 MG/DL (ref ?–200)
CO2 SERPL-SCNC: 26 MMOL/L (ref 21–32)
CREAT BLD-MCNC: 0.69 MG/DL (ref 0.55–1.02)
DEPRECATED RDW RBC AUTO: 48.2 FL (ref 35.1–46.3)
EGFRCR SERPLBLD CKD-EPI 2021: 91 ML/MIN/1.73M2 (ref 60–?)
EOSINOPHIL # BLD AUTO: 0.11 X10(3) UL (ref 0–0.7)
EOSINOPHIL NFR BLD AUTO: 1.6 %
ERYTHROCYTE [DISTWIDTH] IN BLOOD BY AUTOMATED COUNT: 14.6 % (ref 11–15)
FASTING PATIENT LIPID ANSWER: YES
FASTING STATUS PATIENT QL REPORTED: YES
GLOBULIN PLAS-MCNC: 1.9 G/DL (ref 2–3.5)
GLUCOSE BLD-MCNC: 100 MG/DL (ref 70–99)
HCT VFR BLD AUTO: 37.9 % (ref 35–48)
HDLC SERPL-MCNC: 57 MG/DL (ref 40–59)
HEMOGLOBIN A1C: 6.4 % (ref 4.3–5.6)
HGB BLD-MCNC: 12.4 G/DL (ref 12–16)
IMM GRANULOCYTES # BLD AUTO: 0.02 X10(3) UL (ref 0–1)
IMM GRANULOCYTES NFR BLD: 0.3 %
LDLC SERPL CALC-MCNC: 38 MG/DL (ref ?–100)
LYMPHOCYTES # BLD AUTO: 2.99 X10(3) UL (ref 1–4)
LYMPHOCYTES NFR BLD AUTO: 43.7 %
MCH RBC QN AUTO: 29.6 PG (ref 26–34)
MCHC RBC AUTO-ENTMCNC: 32.7 G/DL (ref 31–37)
MCV RBC AUTO: 90.5 FL (ref 80–100)
MONOCYTES # BLD AUTO: 0.48 X10(3) UL (ref 0.1–1)
MONOCYTES NFR BLD AUTO: 7 %
NEUTROPHILS # BLD AUTO: 3.18 X10 (3) UL (ref 1.5–7.7)
NEUTROPHILS # BLD AUTO: 3.18 X10(3) UL (ref 1.5–7.7)
NEUTROPHILS NFR BLD AUTO: 46.5 %
NONHDLC SERPL-MCNC: 64 MG/DL (ref ?–130)
OSMOLALITY SERPL CALC.SUM OF ELEC: 294 MOSM/KG (ref 275–295)
PLATELET # BLD AUTO: 319 10(3)UL (ref 150–450)
POTASSIUM SERPL-SCNC: 3.9 MMOL/L (ref 3.5–5.1)
PROT SERPL-MCNC: 6.9 G/DL (ref 5.7–8.2)
RBC # BLD AUTO: 4.19 X10(6)UL (ref 3.8–5.3)
SODIUM SERPL-SCNC: 141 MMOL/L (ref 136–145)
TRIGL SERPL-MCNC: 160 MG/DL (ref 30–149)
VLDLC SERPL CALC-MCNC: 22 MG/DL (ref 0–30)
WBC # BLD AUTO: 6.8 X10(3) UL (ref 4–11)

## 2025-07-16 PROCEDURE — 3074F SYST BP LT 130 MM HG: CPT | Performed by: STUDENT IN AN ORGANIZED HEALTH CARE EDUCATION/TRAINING PROGRAM

## 2025-07-16 PROCEDURE — 99214 OFFICE O/P EST MOD 30 MIN: CPT | Performed by: STUDENT IN AN ORGANIZED HEALTH CARE EDUCATION/TRAINING PROGRAM

## 2025-07-16 PROCEDURE — 1159F MED LIST DOCD IN RCRD: CPT | Performed by: STUDENT IN AN ORGANIZED HEALTH CARE EDUCATION/TRAINING PROGRAM

## 2025-07-16 PROCEDURE — 3078F DIAST BP <80 MM HG: CPT | Performed by: STUDENT IN AN ORGANIZED HEALTH CARE EDUCATION/TRAINING PROGRAM

## 2025-07-16 PROCEDURE — 3044F HG A1C LEVEL LT 7.0%: CPT | Performed by: STUDENT IN AN ORGANIZED HEALTH CARE EDUCATION/TRAINING PROGRAM

## 2025-07-16 PROCEDURE — 1160F RVW MEDS BY RX/DR IN RCRD: CPT | Performed by: STUDENT IN AN ORGANIZED HEALTH CARE EDUCATION/TRAINING PROGRAM

## 2025-07-16 PROCEDURE — 85025 COMPLETE CBC W/AUTO DIFF WBC: CPT

## 2025-07-16 PROCEDURE — 3072F LOW RISK FOR RETINOPATHY: CPT | Performed by: STUDENT IN AN ORGANIZED HEALTH CARE EDUCATION/TRAINING PROGRAM

## 2025-07-16 PROCEDURE — 80061 LIPID PANEL: CPT

## 2025-07-16 PROCEDURE — 36415 COLL VENOUS BLD VENIPUNCTURE: CPT

## 2025-07-16 PROCEDURE — 80053 COMPREHEN METABOLIC PANEL: CPT

## 2025-07-16 PROCEDURE — 83036 HEMOGLOBIN GLYCOSYLATED A1C: CPT | Performed by: STUDENT IN AN ORGANIZED HEALTH CARE EDUCATION/TRAINING PROGRAM

## 2025-07-16 RX ORDER — LOSARTAN POTASSIUM 50 MG/1
50 TABLET ORAL 2 TIMES DAILY
Qty: 180 TABLET | Refills: 3 | Status: SHIPPED | OUTPATIENT
Start: 2025-07-16

## 2025-07-16 RX ORDER — PANTOPRAZOLE SODIUM 40 MG/1
40 TABLET, DELAYED RELEASE ORAL
Qty: 90 TABLET | Refills: 3 | Status: SHIPPED | OUTPATIENT
Start: 2025-07-16

## 2025-07-16 RX ORDER — ATORVASTATIN CALCIUM 40 MG/1
40 TABLET, FILM COATED ORAL NIGHTLY
Qty: 90 TABLET | Refills: 3 | Status: SHIPPED | OUTPATIENT
Start: 2025-07-16

## 2025-07-16 RX ORDER — AMLODIPINE BESYLATE 2.5 MG/1
2.5 TABLET ORAL DAILY
Qty: 90 TABLET | Refills: 3 | Status: SHIPPED | OUTPATIENT
Start: 2025-07-16

## 2025-07-16 RX ORDER — MONTELUKAST SODIUM 10 MG/1
10 TABLET ORAL NIGHTLY
Qty: 90 TABLET | Refills: 3 | Status: SHIPPED | OUTPATIENT
Start: 2025-07-16

## 2025-07-16 NOTE — PROGRESS NOTES
OFFICE NOTE       The following individual(s) verbally consented to be recorded using ambient AI listening technology and understand that they can each withdraw their consent to this listening technology at any point by asking the clinician to turn off or pause the recording:    Patient name: Beatriz Rosario  Additional names:            Patient ID: Beatriz Rosario is a 75 year old female.  Today's Date: 07/16/25  Chief Complaint: No chief complaint on file.      History of Present Illness  Beatriz Rosario is a 75 year old female with diabetes and ductal carcinoma in situ who presents for follow-up of her A1c and medication management.    Her recent A1c was 6.4, which is below her target of 6.5. She is managing her diabetes without medications like metformin and is monitoring her diet, although she mentions eating out recently. She aims to lower her A1c further, ideally below 6.    She has a history of ductal carcinoma in situ and is currently on hormone replacement therapy with raloxifene. She experiences mild edema in her legs, which she attributes to the medication. Her legs feel tired, but the side effects are manageable.    She is taking amlodipine 2.5 mg twice daily and losartan one tablet twice daily for blood pressure management. She experiences leg swelling, which she associates with amlodipine. Her blood pressure is currently well-controlled at 115/68 mmHg.    She experiences loose stools once a day, which she suspects might be related to her chemotherapy. Her current medications include vitamin D 5000 units over the counter, montelukast for cough and allergies, and pantoprazole 40 mg for GERD, which she takes as needed. She is also on glimepiride, which is managed through her pharmacy.    She plans to travel to the [facility] in August for two weeks and is mindful of maintaining her health during the trip. No chest pain, no palpitations, and she has been walking regularly.     Lab Results    Component Value Date    A1C 6.4 (A) 07/16/2025    A1C 6.1 (A) 04/26/2025    A1C 6.3 (H) 01/25/2025    A1C 6.3 (H) 10/10/2024    A1C 6.2 (A) 05/25/2024         Vitals:    07/16/25 0905   BP: 115/68   Pulse: 76     body mass index is unknown because there is no height or weight on file.  BP Readings from Last 3 Encounters:   07/16/25 115/68   06/18/25 107/62   05/28/25 120/67     The 10-year ASCVD risk score (Diego LUCIO, et al., 2019) is: 26.8%    Values used to calculate the score:      Age: 75 years      Sex: Female      Is Non- : No      Diabetic: Yes      Tobacco smoker: No      Systolic Blood Pressure: 115 mmHg      Is BP treated: Yes      HDL Cholesterol: 55 mg/dL      Total Cholesterol: 133 mg/dL  Results  LABS  A1c: 6.4    DIAGNOSTIC  Eye exam: No retinopathy (Spring 2025)       Medications reviewed:  Current Medications[1]      Assessment & Plan    1. Type 2 diabetes mellitus without complication, without long-term current use of insulin (HCC) (Primary)  -     POC Hemoglobin A1C  -     Cancel: Diabetic Retinopathy Exam; Future; Expected date: 07/16/2025  2. Chronic primary angle-closure glaucoma, indeterminate stage, unspecified laterality  -     Cancel: Diabetic Retinopathy Exam; Future; Expected date: 07/16/2025  3. Hypertension associated with type 2 diabetes mellitus (HCC)  -     amLODIPine Besylate; Take 1 tablet (2.5 mg total) by mouth daily.  Dispense: 90 tablet; Refill: 3  -     Losartan Potassium; Take 1 tablet (50 mg total) by mouth 2 (two) times daily.  Dispense: 180 tablet; Refill: 3  4. Hyperlipidemia associated with type 2 diabetes mellitus (HCC)  -     Atorvastatin Calcium; Take 1 tablet (40 mg total) by mouth nightly.  Dispense: 90 tablet; Refill: 3  5. Environmental and seasonal allergies  -     Montelukast Sodium; Take 1 tablet (10 mg total) by mouth nightly.  Dispense: 90 tablet; Refill: 3  6. Gastroesophageal reflux disease with esophagitis without hemorrhage  -      Pantoprazole Sodium; Take 1 tablet (40 mg total) by mouth every morning before breakfast.  Dispense: 90 tablet; Refill: 3    Assessment & Plan  Ductal Carcinoma In Situ (DCIS)  DCIS stage zero, no radiation or debulking needed. Raloxifene causing mild leg edema. Oral chemotherapy preferred.  - Continue raloxifene as prescribed by Dr. Hale.  - Monitor for side effects of raloxifene, including leg edema.    Diabetes Mellitus  A1c at 6.4%, below target. Concern about dietary habits affecting glucose control.  - Monitor A1c levels.  - Encourage dietary modifications to improve glucose control.  - Follow up in mid-October to recheck A1c.    Hypertension  Blood pressure controlled at 115/68 mmHg. Amlodipine may contribute to leg edema.  - Reduce amlodipine to once daily to minimize leg swelling.  - Continue losartan as prescribed.  - Monitor blood pressure regularly.    Gastrointestinal Symptoms  Loose stools possibly related to chemotherapy and diet. Probiotics recommended.  - Recommend probiotic supplementation to manage gastrointestinal symptoms.  - Encourage dietary modifications, including increased fiber and water intake.       Follow Up: As needed/if symptoms worsen or Return in about 3 months (around 10/16/2025) for Diabetes follow up..     I spent 36 minutes obtaining pertitent medical history, reviewing pertinent imaging/labs and specialists notes, evaluating patient, discussing differential diagnosis' and various treatment options, reinforcing importance of compliance with treatment plan, and completing documentation.         Objective/ Results:   Physical Exam  Constitutional:       Appearance: She is well-developed.   HENT:      Head: Normocephalic and atraumatic.      Right Ear: External ear normal.      Left Ear: External ear normal.      Nose: Nose normal.   Eyes:      Conjunctiva/sclera: Conjunctivae normal.      Pupils: Pupils are equal, round, and reactive to light.   Cardiovascular:      Rate and  Rhythm: Normal rate and regular rhythm.      Heart sounds: Normal heart sounds.   Pulmonary:      Effort: Pulmonary effort is normal.      Breath sounds: Normal breath sounds.   Abdominal:      General: Bowel sounds are normal.      Palpations: Abdomen is soft.   Genitourinary:     Vagina: Normal.   Musculoskeletal:         General: Normal range of motion.      Cervical back: Normal range of motion and neck supple.   Skin:     General: Skin is warm and dry.   Neurological:      Mental Status: She is alert and oriented to person, place, and time.      Deep Tendon Reflexes: Reflexes are normal and symmetric.   Psychiatric:         Behavior: Behavior normal.         Thought Content: Thought content normal.         Judgment: Judgment normal.        Physical Exam  VITALS: BP- 115/68     Reviewed:    Patient Active Problem List    Diagnosis    Gastroesophageal reflux disease    Breast neoplasm, Tis (DCIS), right    Lung nodules    Ductal carcinoma in situ (DCIS) of right breast    BRCA gene mutation negative in female     Negative 48 gene hereditary cancer panel ([employer] common hereditary cancers + RNA panel), report in media tab      PAD (peripheral artery disease)    History of ductal carcinoma in situ (DCIS) of right breast    Chronic cough    Age-related osteoporosis without current pathological fracture    Pancreatic cyst (HCC)    Medicare annual wellness visit, subsequent    Hyperlipidemia associated with type 2 diabetes mellitus (HCC)    Colon cancer screening    Atherosclerosis of aorta     Ct abd/pelvis 8/2015      Glaucoma    Type 2 diabetes mellitus without complication, without long-term current use of insulin (HCC)    Vitamin D deficiency    Allergic rhinitis    Hypertension associated with type 2 diabetes mellitus (HCC)      Allergies[2]   Short Social Hx on File[3]   Review of Systems   Constitutional: Negative.    Respiratory: Negative.     Cardiovascular:  Positive for leg swelling (bilateral ankle).    Gastrointestinal: Negative.    Skin: Negative.    Neurological: Negative.        All other systems negative unless otherwise stated in ROS or HPI above.       Zack Ellison MD  Internal Medicine       Call office with any questions or seek emergency care if necessary.   Patient understands and agrees to follow directions and advice.      ----------------------------------------- PATIENT INSTRUCTIONS-----------------------------------------     There are no Patient Instructions on file for this visit.        The 21st Century Cures Act makes medical notes available to patients in the interest of transparency.  However, please be advised that this is a medical document.  It is intended as a peer to peer communication.  It is written in medical language and may contain abbreviations or verbiage that are technical and unfamiliar.  It may appear blunt or direct.  Medical documents are intended to carry relevant information, facts as evident, and the clinical opinion of the practitioner.          [1]   Current Outpatient Medications   Medication Sig Dispense Refill    amLODIPine 2.5 MG Oral Tab Take 1 tablet (2.5 mg total) by mouth daily. 90 tablet 3    atorvastatin 40 MG Oral Tab Take 1 tablet (40 mg total) by mouth nightly. 90 tablet 3    losartan 50 MG Oral Tab Take 1 tablet (50 mg total) by mouth 2 (two) times daily. 180 tablet 3    montelukast 10 MG Oral Tab Take 1 tablet (10 mg total) by mouth nightly. 90 tablet 3    pantoprazole 40 MG Oral Tab EC Take 1 tablet (40 mg total) by mouth every morning before breakfast. 90 tablet 3    raloxifene 60 MG Oral Tab Take 1 tablet (60 mg total) by mouth daily. 30 tablet 6    cholecalciferol (VITAMIN D3) 125 MCG (5000 UT) Oral Cap Take 1 capsule (5,000 Units total) by mouth daily. 90 capsule 3    Calcium Carbonate-Vitamin D 600-200 MG-UNIT Oral Cap Take 1 capsule by mouth every morning.      aspirin 81 MG Oral Tab EC Take by mouth. take 1 tablet (81MG)  by ORAL route  every day       Multiple Vitamin (MULTI-DAY) Oral Tab Take 1 tablet by mouth in the morning.      Glucose Blood (TRUE METRIX BLOOD GLUCOSE TEST) In Vitro Strip 1 strip by In Vitro route daily. 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

## 2025-07-29 ENCOUNTER — HOSPITAL ENCOUNTER (OUTPATIENT)
Dept: CT IMAGING | Age: 75
Discharge: HOME | End: 2025-07-29
Attending: INTERNAL MEDICINE

## 2025-07-29 DIAGNOSIS — R91.1 LUNG NODULE: ICD-10-CM

## 2025-07-29 PROCEDURE — 71250 CT THORAX DX C-: CPT | Performed by: INTERNAL MEDICINE

## 2025-08-28 ENCOUNTER — NURSE TRIAGE (OUTPATIENT)
Dept: INTERNAL MEDICINE CLINIC | Facility: CLINIC | Age: 75
End: 2025-08-28

## 2025-08-30 DIAGNOSIS — R05.3 CHRONIC COUGH: ICD-10-CM

## 2025-08-30 RX ORDER — BENZONATATE 100 MG/1
100 CAPSULE ORAL 3 TIMES DAILY PRN
Qty: 30 CAPSULE | Refills: 0 | Status: SHIPPED | OUTPATIENT
Start: 2025-08-30

## (undated) DEVICE — ANTIBACTERIAL UNDYED BRAIDED (POLYGLACTIN 910), SYNTHETIC ABSORBABLE SUTURE: Brand: COATED VICRYL

## (undated) DEVICE — SUT PDS II 3-0 18IN ABSRB UD L24MM PS-1

## (undated) DEVICE — Device: Brand: JELCO

## (undated) DEVICE — KIT ENDO ORCAPOD 160/180/190

## (undated) DEVICE — Device

## (undated) DEVICE — MEDI-VAC NON-CONDUCTIVE SUCTION TUBING: Brand: CARDINAL HEALTH

## (undated) DEVICE — 60 ML SYRINGE REGULAR TIP: Brand: MONOJECT

## (undated) DEVICE — SOLUTION IRRIG 1000ML 0.9% NACL USP BTL

## (undated) DEVICE — PAD,ABDOMINAL,8"X7.5",STERILE,LF,1/PK: Brand: MEDLINE

## (undated) DEVICE — INSULATED BLADE ELECTRODE: Brand: EDGE

## (undated) DEVICE — KIT CLEAN ENDOKIT 1.1OZ GOWNX2

## (undated) DEVICE — WECK HORIZON SMALL YELLOW CLIP DISP

## (undated) DEVICE — SUT MCRYL 4-0 18IN PS-2 ABSRB UD 19MM 3/8 CIR

## (undated) DEVICE — 12 ML SYRINGE LUER-LOCK TIP: Brand: MONOJECT

## (undated) DEVICE — BRA SURG ELIZ PINK M

## (undated) DEVICE — DRAPE TAPE: Brand: CONVERTORS

## (undated) DEVICE — MEDI-VAC NON-CONDUCTIVE SUCTION TUBING 6MM X 1.8M (6FT.) L: Brand: CARDINAL HEALTH

## (undated) DEVICE — GIJAW SINGLE-USE BIOPSY FORCEPS WITH NEEDLE: Brand: GIJAW

## (undated) DEVICE — MINOR GENERAL: Brand: MEDLINE INDUSTRIES, INC.

## (undated) DEVICE — YANKAUER,BULB TIP,W/O VENT,RIGID,STERILE: Brand: MEDLINE

## (undated) DEVICE — ADHESIVE LIQ 2/3ML VI MASTISOL

## (undated) DEVICE — CONMED SCOPE SAVER BITE BLOCK, 20X27 MM: Brand: SCOPE SAVER

## (undated) DEVICE — SUT PERMA- 2-0 18IN FS NABSRB BLK 26MM 3/8

## (undated) DEVICE — WECK HORIZON MED BLUE CLIP DISP

## (undated) DEVICE — YANKAUER,FLEXIBLE HANDLE,REGLR CAPACITY: Brand: MEDLINE INDUSTRIES, INC.

## (undated) DEVICE — PACK,UNIVERSAL,SPLIT,II: Brand: MEDLINE

## (undated) DEVICE — V2 SPECIMEN COLLECTION MANIFOLD KIT: Brand: NEPTUNE

## (undated) DEVICE — GAMMEX® PI HYBRID SIZE 6.5, STERILE POWDER-FREE SURGICAL GLOVE, POLYISOPRENE AND NEOPRENE BLEND: Brand: GAMMEX

## (undated) NOTE — MR AVS SNAPSHOT
[address] [facility] [address]  [phone]               Thank you for choosing us for your health care visit with Stefan Arroyo MD.  We are glad to serve you and happy to provide you with this summary of your visit. azithromycin 250 MG Tabs   Take two tablets by mouth today, then one daily.  Take with food and eat some yogurt after   Commonly known as:  ZITHROMAX           benzonatate 100 MG Caps   Take 1 capsule (100 mg total) by mouth 3 (three) times daily as nee including white bread, rice and pasta   Eat plenty of protein, keep the fat content low Sugars:  sodas and sports drinks, candies and desserts   Eat plenty of low-fat dairy products High fat meats and dairy   Choose whole grain products Foods high in sodiu

## (undated) NOTE — LETTER
[facility]  [address], [place], [place]    Authorization for Surgical Operation and Procedure                               I hereby authorize Marcos Sloan MD, my physician and his/her assistants (if applicable), which may include medical students, residents, and/or fellows, to perform the following surgical operation/ procedure and administer such anesthesia as may be determined necessary by my physician: Operation/Procedure name (s) ESOPHAGOGASTRODUODENOSCOPY on [employer]   2.   I recognize that during the surgical operation/procedure, unforeseen conditions may necessitate additional or different procedures than those listed above.  I, therefore, further authorize and request that the above-named surgeon, assistants, or designees perform such procedures as are, in their judgment, necessary and desirable.    3.   My surgeon/physician has discussed prior to my surgery the potential benefits, risks and side effects of this procedure; the likelihood of achieving goals; and potential problems that might occur during recuperation.  They also discussed reasonable alternatives to the procedure, including risks, benefits, and side effects related to the alternatives and risks related to not receiving this procedure.  I have had all my questions answered and I acknowledge that no guarantee has been made as to the result that may be obtained.    4.   Should the need arise during my operation/procedure, which includes change of level of care prior to discharge, I also consent to the administration of blood and/or blood products.  Further, I understand that despite careful testing and screening of blood or blood products by collecting agencies, I may still be subject to ill effects as a result of receiving a blood transfusion and/or blood products.  The following are some, but not all, of the potential risks that can occur: fever and allergic reactions, hemolytic reactions, transmission  of diseases such as Hepatitis, AIDS and Cytomegalovirus (CMV) and fluid overload.  In the event that I wish to have an autologous transfusion of my own blood, or a directed donor transfusion, I will discuss this with my physician.  Check only if Refusing Blood or Blood Products  I understand refusal of blood or blood products as deemed necessary by my physician may have serious consequences to my condition to include possible death. I hereby assume responsibility for my refusal and release the hospital, its personnel, and my physicians from any responsibility for the consequences of my refusal.    o  Refuse   5.   I authorize the use of any specimen, organs, tissues, body parts or foreign objects that may be removed from my body during the operation/procedure for diagnosis, research or teaching purposes and their subsequent disposal by hospital authorities.  I also authorize the release of specimen test results and/or written reports to my treating physician on the hospital medical staff or other referring or consulting physicians involved in my care, at the discretion of the Pathologist or my treating physician.    6.   I consent to the photographing or videotaping of the operations or procedures to be performed, including appropriate portions of my body for medical, scientific, or educational purposes, provided my identity is not revealed by the pictures or by descriptive texts accompanying them.  If the procedure has been photographed/videotaped, the surgeon will obtain the original picture, image, videotape or CD.  The hospital will not be responsible for storage, release or maintenance of the picture, image, tape or CD.    7.   I consent to the presence of a  or observers in the operating room as deemed necessary by my physician or their designees.    8.   I recognize that in the event my procedure results in extended X-Ray/fluoroscopy time, I may develop a skin reaction.    9. If I have a Do  Not Attempt Resuscitation (DNAR) order in place, that status will be suspended while in the operating room, procedural suite, and during the recovery period unless otherwise explicitly stated by me (or a person authorized to consent on my behalf). The surgeon or my attending physician will determine when the applicable recovery period ends for purposes of reinstating the DNAR order.  10. Patients having a sterilization procedure: I understand that if the procedure is successful the results will be permanent and it will therefore be impossible for me to inseminate, conceive, or bear children.  I also understand that the procedure is intended to result in sterility, although the result has not been guaranteed.   11. I acknowledge that my physician has explained sedation/analgesia administration to me including the risk and benefits I consent to the administration of sedation/analgesia as may be necessary or desirable in the judgment of my physician.    I CERTIFY THAT I HAVE READ AND FULLY UNDERSTAND THE ABOVE CONSENT TO OPERATION and/or OTHER PROCEDURE.     ____________________________________  _________________________________        ______________________________  Signature of Patient    Signature of Responsible Person                Printed Name of Responsible Person                                      ____________________________________  _____________________________                ________________________________  Signature of Witness        Date  Time         Relationship to Patient    STATEMENT OF PHYSICIAN My signature below affirms that prior to the time of the procedure; I have explained to the patient and/or his/her legal representative, the risks and benefits involved in the proposed treatment and any reasonable alternative to the proposed treatment. I have also explained the risks and benefits involved in refusal of the proposed treatment and alternatives to the proposed treatment and have answered the  patient's questions. If I have a significant financial interest in a co-management agreement or a significant financial interest in any product or implant, or other significant relationship used in this procedure/surgery, I have disclosed this and had a discussion with my patient.     _____________________________________________________              _____________________________  (Signature of Physician)                                                                                         (Date)                                   (Time)  Patient Name: Beat[employer]      : 1950      Printed: 2025     Medical Record #: [id number]                                      Page 1 of 1

## (undated) NOTE — LETTER
AUTHORIZATION FOR SURGICAL OPERATION OR OTHER PROCEDURE    1. I hereby authorize Dr. Selma Silva, and [address] [place] [address] staff assigned to my case to perform the following operation and/or procedure at the [address] [place] [address]:    _______________________________________________________________________________________________    Right shoulder cortisone injectin  _______________________________________________________________________________________________    2. My physician has explained the nature and purpose of the operation or other procedure, possible alternative methods of treatment, the risks involved, and the possibility of complication to me. I acknowledge that no guarantee has been made as to the result that may be obtained. 3.  I recognize that, during the course of this operation, or other procedure, unforseen conditions may necessitate additional or different procedure than those listed above. I, therefore, further authorize and request that the above named physician, his/her physician assistants or designees perform such procedures as are, in his/her professional opinion, necessary and desirable. 4.  Any tissue or organs removed in the operation or other procedure may be disposed of by and at the discretion of the [address] [place] [address] and [facility]. 5.  I understand that in the event of a medical emergency, I will be transported by local paramedics to [facility] or other hospital emergency department. 6.  I certify that I have read and fully understand the above consent to operation and/or other procedure. 7.  I acknowledge that my physician has explained sedation/analgesia administration to me including the risks and benefits. I consent to the administration of sedation/analgesia as may be necessary or desirable in the judgement of my physician.     Witness signature: ___________________________________________________ Date:  ______/______/_____ Time:  ________ A. M.  P.M. Patient Name:  ______________________________________________________  (please print)      Patient signature:  ___________________________________________________             Relationship to Patient:           []  Parent    Responsible person                          []  Spouse  In case of minor or                    [] Other  _____________   Incompetent name:  __________________________________________________                               (please print)      _____________      Responsible person  In case of minor or  Incompetent signature:  _______________________________________________    Statement of Physician  My signature below affirms that prior to the time of the procedure, I have explained to the patient and/or his/her guardian, the risks and benefits involved in the proposed treatment and any reasonable alternative to the proposed treatment. I have also explained the risks and benefits involved in the refusal of the proposed treatment and have answered the patient's questions.                         Date:  ______/______/_______  Provider                      Signature:  __________________________________________________________       Time:  ___________ A.M    P.M.

## (undated) NOTE — LETTER
[place] [address], [place], [place]    Authorization for Surgical Operation and Procedure                               I hereby authorize                                                     , MD, my physician and his/her assistants (if applicable), which may include medical students, residents, and/or fellows, to perform the following surgical operation/ procedure and administer such anesthesia as may be determined necessary by my physician: Operation/Procedure name (s) Right breast wire localization on [employer]   2.   I recognize that during the surgical operation/procedure, unforeseen conditions may necessitate additional or different procedures than those listed above.  I, therefore, further authorize and request that the above-named surgeon, assistants, or designees perform such procedures as are, in their judgment, necessary and desirable.    3.   My surgeon/physician has discussed prior to my surgery the potential benefits, risks and side effects of this procedure; the likelihood of achieving goals; and potential problems that might occur during recuperation.  They also discussed reasonable alternatives to the procedure, including risks, benefits, and side effects related to the alternatives and risks related to not receiving this procedure.  I have had all my questions answered and I acknowledge that no guarantee has been made as to the result that may be obtained.    4.   Should the need arise during my operation/procedure, which includes change of level of care prior to discharge, I also consent to the administration of blood and/or blood products.  Further, I understand that despite careful testing and screening of blood or blood products by collecting agencies, I may still be subject to ill effects as a result of receiving a blood transfusion and/or blood products.  The following are some, but not all, of the potential risks that can occur: fever and allergic reactions,  hemolytic reactions, transmission of diseases such as Hepatitis, AIDS and Cytomegalovirus (CMV) and fluid overload.  In the event that I wish to have an autologous transfusion of my own blood, or a directed donor transfusion, I will discuss this with my physician.  Check only if Refusing Blood or Blood Products  I understand refusal of blood or blood products as deemed necessary by my physician may have serious consequences to my condition to include possible death. I hereby assume responsibility for my refusal and release the hospital, its personnel, and my physicians from any responsibility for the consequences of my refusal.    o  Refuse   5.   I authorize the use of any specimen, organs, tissues, body parts or foreign objects that may be removed from my body during the operation/procedure for diagnosis, research or teaching purposes and their subsequent disposal by hospital authorities.  I also authorize the release of specimen test results and/or written reports to my treating physician on the hospital medical staff or other referring or consulting physicians involved in my care, at the discretion of the Pathologist or my treating physician.    6.   I consent to the photographing or videotaping of the operations or procedures to be performed, including appropriate portions of my body for medical, scientific, or educational purposes, provided my identity is not revealed by the pictures or by descriptive texts accompanying them.  If the procedure has been photographed/videotaped, the surgeon will obtain the original picture, image, videotape or CD.  The hospital will not be responsible for storage, release or maintenance of the picture, image, tape or CD.    7.   I consent to the presence of a  or observers in the operating room as deemed necessary by my physician or their designees.    8.   I recognize that in the event my procedure results in extended X-Ray/fluoroscopy time, I may develop a  skin reaction.    9. If I have a Do Not Attempt Resuscitation (DNAR) order in place, that status will be suspended while in the operating room, procedural suite, and during the recovery period unless otherwise explicitly stated by me (or a person authorized to consent on my behalf). The surgeon or my attending physician will determine when the applicable recovery period ends for purposes of reinstating the DNAR order.  10. Patients having a sterilization procedure: I understand that if the procedure is successful the results will be permanent and it will therefore be impossible for me to inseminate, conceive, or bear children.  I also understand that the procedure is intended to result in sterility, although the result has not been guaranteed.   11. I acknowledge that my physician has explained sedation/analgesia administration to me including the risk and benefits I consent to the administration of sedation/analgesia as may be necessary or desirable in the judgment of my physician.    I CERTIFY THAT I HAVE READ AND FULLY UNDERSTAND THE ABOVE CONSENT TO OPERATION and/or OTHER PROCEDURE.     ____________________________________  _________________________________        ______________________________  Signature of Patient    Signature of Responsible Person                Printed Name of Responsible Person                                      ____________________________________  _____________________________                ________________________________  Signature of Witness        Date  Time         Relationship to Patient    STATEMENT OF PHYSICIAN My signature below affirms that prior to the time of the procedure; I have explained to the patient and/or his/her legal representative, the risks and benefits involved in the proposed treatment and any reasonable alternative to the proposed treatment. I have also explained the risks and benefits involved in refusal of the proposed treatment and alternatives to the  proposed treatment and have answered the patient's questions. If I have a significant financial interest in a co-management agreement or a significant financial interest in any product or implant, or other significant relationship used in this procedure/surgery, I have disclosed this and had a discussion with my patient.     _____________________________________________________              _____________________________  (Signature of Physician)                                                                                         (Date)                                   (Time)  Patient Name: Beat[employer] Fisher [employer]      : 1950      Printed: 2025     Medical Record #: [id number]                                      Page 1 of 1

## (undated) NOTE — LETTER
AUTHORIZATION FOR SURGICAL OPERATION OR OTHER PROCEDURE    1. I hereby authorize Tim Andrade/Linda Chase PA-C, and [facility] staff assigned to my case to perform the following operation and/or procedure at the [facility] site:    _______________________________________________________________________________________________    Right shoulder cortisone injection  _______________________________________________________________________________________________    2.  My physician has explained the nature and purpose of the operation or other procedure, possible alternative methods of treatment, the risks involved, and the possibility of complication to me.  I acknowledge that no guarantee has been made as to the result that may be obtained.  3.  I recognize that, during the course of this operation, or other procedure, unforseen conditions may necessitate additional or different procedure than those listed above.  I, therefore, further authorize and request that the above named physician, his/her physician assistants or designees perform such procedures as are, in his/her professional opinion, necessary and desirable.  4.  Any tissue or organs removed in the operation or other procedure may be disposed of by and at the discretion of the [facility] and [facility].  5.  I understand that in the event of a medical emergency, I will be transported by local paramedics to [facility] or other hospital emergency department.  6.  I certify that I have read and fully understand the above consent to operation and/or other procedure.    7.  I acknowledge that my physician has explained sedation/analgesia administration to me including the risks and benefits.  I consent to the administration of sedation/analgesia as may be necessary or desirable in the judgement of my physician.    Witness signature: ___________________________________________________ Date:   ______/______/_____                    Time:  ________ A.M.  P.M.       Patient Name:  ______________________________________________________  (please print)      Patient signature:  ___________________________________________________             Relationship to Patient:           []  Parent    Responsible person                          []  Spouse  In case of minor or                    [] Other  _____________   Incompetent name:  __________________________________________________                               (please print)      _____________      Responsible person  In case of minor or  Incompetent signature:  _______________________________________________    Statement of Physician  My signature below affirms that prior to the time of the procedure, I have explained to the patient and/or his/her guardian, the risks and benefits involved in the proposed treatment and any reasonable alternative to the proposed treatment.  I have also explained the risks and benefits involved in the refusal of the proposed treatment and have answered the patient's questions.                        Date:  ______/______/_______  Provider                      Signature:  __________________________________________________________       Time:  ___________ A.M    P.M.

## (undated) NOTE — LETTER
[address] [facility]    Consent for Coronary CT Angiography    Your doctor has recommended that you have a Coronary CT Angiography procedure.  Coronary CT Angiography is a diagnostic procedure using computed tomography to scan the can range from very minor to very serious leading to a life threatening situation or even death. Please be sure to communicate any allergy you may have to your caregiver immediately.     The information that is obtained during your testing will be treated a

## (undated) NOTE — LETTER
09/03/21        [address] [facility] [address]      Dear Tony Thapa,    [address] [facility] records indicate that you have outstanding lab work and or testing that was ordered for you and has not yet been completed:  Orders Harini

## (undated) NOTE — LETTER
01/20/22        [address] [facility] [address]      Dear Awais Byrd,    [address] [facility] records indicate that you have outstanding lab work and or testing that was ordered for you and has not yet been completed:  Orders Harini

## (undated) NOTE — LETTER
7/14/2021              [employer]        [address]         [facility] [address]         To whom it may concern,    This is to certify that Ms Riaz Najera was seen in our clinic today.  She is physically fit to work as

## (undated) NOTE — LETTER
9/5/2023              [employer]        [address]         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        Sincerely,    Robi Baker MD  [facility], [address], [address]  [phone]        Document electronically generated by:  Robi Baker MD

## (undated) NOTE — LETTER
Date & Time: 11/4/2023, 12:33 PM  Patient: Dorothea Rosario  Encounter Provider(s):    Arthor Officer, MARQUITA       To Whom It May Concern:    Beatriz Rosario was seen and treated in our department on 11/4/2023. She can return to work 11/10/2023.     If you have any questions or concerns, please do not hesitate to call.        _____________________________  Physician/APC Signature

## (undated) NOTE — LETTER
AUTHORIZATION FOR SURGICAL OPERATION OR OTHER PROCEDURE    1. I hereby authorize Dr. John Sheehan and Madison Young, and [facility] staff assigned to my case to perform the following operation and/or procedure at the [facility]:    Right shoulder cortisone Injection _______________________________________________________________________________________________      _______________________________________________________________________________________________    2. My physician has explained the nature and purpose of the operation or other procedure, possible alternative methods of treatment, the risks involved, and the possibility of complication to me. I acknowledge that no guarantee has been made as to the result that may be obtained. 3.  I recognize that, during the course of this operation, or other procedure, unforseen conditions may necessitate additional or different procedure than those listed above. I, therefore, further authorize and request that the above named physician, his/her physician assistants or designees perform such procedures as are, in his/her professional opinion, necessary and desirable. 4.  Any tissue or organs removed in the operation or other procedure may be disposed of by and at the discretion of the [facility] and [facility] AT [facility]. 5.  I understand that in the event of a medical emergency, I will be transported by local paramedics to [facility] or other hospital emergency department. 6.  I certify that I have read and fully understand the above consent to operation and/or other procedure. 7.  I acknowledge that my physician has explained sedation/analgesia administration to me including the risks and benefits. I consent to the administration of sedation/analgesia as may be necessary or desirable in the judgement of my physician.     Witness signature: ___________________________________________________ Date: ______/______/_____                    Time:  ________ A. M.  P.M. Patient Name:  ______________________________________________________  (please print)      Patient signature:  ___________________________________________________             Relationship to Patient:           []  Parent    Responsible person                          []  Spouse  In case of minor or                    [] Other  _____________   Incompetent name:  __________________________________________________                               (please print)      _____________      Responsible person  In case of minor or  Incompetent signature:  _______________________________________________    Statement of Physician  My signature below affirms that prior to the time of the procedure, I have explained to the patient and/or his/her guardian, the risks and benefits involved in the proposed treatment and any reasonable alternative to the proposed treatment. I have also explained the risks and benefits involved in the refusal of the proposed treatment and have answered the patient's questions.                         Date:  ______/______/_______  Provider                      Signature:  __________________________________________________________       Time:  ___________ A.M    P.M.

## (undated) NOTE — LETTER
5/7/2020              Beatriz Sanchez [employer] [place] [id number]         To whom it may concern,    This is to certify that Ms Madi Newton was seen and examined today in our clinic.  She was found to be

## (undated) NOTE — LETTER
AUTHORIZATION FOR SURGICAL OPERATION OR OTHER PROCEDURE    1. I hereby authorize Dr. Dominguez Kaye, and [facility] staff assigned to my case to perform the following operation and/or procedure at the [facility]:    _______________________________________________________________________________________________    Right Shoulder Cortisone Injection   _______________________________________________________________________________________________    2. My physician has explained the nature and purpose of the operation or other procedure, possible alternative methods of treatment, the risks involved, and the possibility of complication to me. I acknowledge that no guarantee has been made as to the result that may be obtained. 3.  I recognize that, during the course of this operation, or other procedure, unforseen conditions may necessitate additional or different procedure than those listed above. I, therefore, further authorize and request that the above named physician, his/her physician assistants or designees perform such procedures as are, in his/her professional opinion, necessary and desirable. 4.  Any tissue or organs removed in the operation or other procedure may be disposed of by and at the discretion of the [facility] and [facility] AT [facility]. 5.  I understand that in the event of a medical emergency, I will be transported by local paramedics to [facility] or other hospital emergency department. 6.  I certify that I have read and fully understand the above consent to operation and/or other procedure. 7.  I acknowledge that my physician has explained sedation/analgesia administration to me including the risks and benefits. I consent to the administration of sedation/analgesia as may be necessary or desirable in the judgement of my physician.     Witness signature: ___________________________________________________ Date:  ______/______/_____ Time:  ________ A. M.  P.M. Patient Name:  ______________________________________________________  (please print)      Patient signature:  ___________________________________________________             Relationship to Patient:           []  Parent    Responsible person                          []  Spouse  In case of minor or                    [] Other  _____________   Incompetent name:  __________________________________________________                               (please print)      _____________      Responsible person  In case of minor or  Incompetent signature:  _______________________________________________    Statement of Physician  My signature below affirms that prior to the time of the procedure, I have explained to the patient and/or his/her guardian, the risks and benefits involved in the proposed treatment and any reasonable alternative to the proposed treatment. I have also explained the risks and benefits involved in the refusal of the proposed treatment and have answered the patient's questions.                         Date:  ______/______/_______  Provider                      Signature:  __________________________________________________________       Time:  ___________ A.M    P.M.

## (undated) NOTE — LETTER
May 22, 2020    Jayesh Chao [address] [id number]      Dear Amaya De Leónn:    VITAMIN B12   Order: [id number]   Collected:  5/7/2020  9:03 AM Status:  Final result Dx:  Vitamin B12 deficiency   Component  Ref Range & Units Ivelisse Scanlon on 5/7/2020 Sent   [employer] Results Release     MyChart Status: Active  Results Release   Orders with Same Specimen ID     COMP METABOLIC PANEL (14) (XSS#[id number]) on 5/7/20   LIPID PANEL (Order#[id number]) on 5/7/20   View [employer] LDL Cholesterol 33 <100 mg/dL    VLDL 35 (H) 0 - 30 mg/dL    Non HDL Chol 68 <130 mg/dL    FASTING Yes    MICROALB/CREAT RATIO, RANDOM URINE   Result Value Ref Range    Microalbumin, Urine 0.95 mg/dL    Creatinine Ur Random 28.40 mg/dL    Malb/Cre Calc 33

## (undated) NOTE — LETTER
[facility]  [address] [facility] [facility] [place] [address]  [phone]  Authorization for Imaging Procedure    I hereby authorize                                   , my physician and his/her assistants (if applicable), which may include medical students, residents, and/or fellows, to perform the following procedure and administer such anesthesia as may be determined necessary by my physician: MAGNETIC RESONANCE IMAGE GUIDED RIGHT BREAST BIOPSY WITH CLIP PLACEMENT on [employer]   2.  I recognize that during the procedure, unforeseen conditions may necessitate additional or different procedures than those listed above. I, therefore, further authorize and request that the above-named physician, assistants, or designees perform such procedures as are, in their judgment, necessary and desirable.    3.  My physician has discussed prior to my procedure the potential benefits, risks and side effects of this procedure; the likelihood of achieving goals; and potential problems that might occur during recuperation. They also discussed reasonable alternatives to the procedure, including risks, benefits, and side effects related to the alternatives and risks related to not receiving this procedure. I have had all my questions answered and I acknowledge that no guarantee has been made as to the result that may be obtained.    4.  Should the need arise during my procedure, which includes change of level of care prior to discharge, I also consent to the administration of blood and/or blood products. Further, I understand that despite careful testing and screening of blood or blood products by collecting agencies, I may still be subject to ill effects as a result of receiving a blood transfusion and/or blood products. The following are some, but not all, of the potential risks that can occur: fever and allergic reactions, hemolytic reactions, transmission of diseases such as Hepatitis, AIDS and  Cytomegalovirus (CMV) and fluid overload. In the event that I wish to have an autologous transfusion of my own blood, or a directed donor transfusion, I will discuss this with my physician.  Check only if Refusing Blood or Blood Products  I understand refusal of blood or blood products as deemed necessary by my physician may have serious consequences to my condition to include possible death. I hereby assume responsibility for my refusal and release the hospital, its personnel, and my physicians from any responsibility for the consequences of my refusal.   [  ] Patient Refuses Blood      5.  I authorize the use of any specimen, organs, tissues, body parts or foreign objects that may be removed from my body during the procedure for diagnosis, research or teaching purposes and their subsequent disposal by hospital authorities. I also authorize the release of specimen test results and/or written reports to my treating physician on the hospital medical staff or other referring or consulting physicians involved in my care, at the discretion of the Pathologist or my treating physician.    6.  I consent to the photographing or videotaping of the procedures to be performed, including appropriate portions of my body for medical, scientific, or educational purposes, provided my identity is not revealed by the pictures or by descriptive texts accompanying them. If the procedure has been photographed/videotaped, the physician will obtain the original picture, image, videotape or CD. The hospital will not be responsible for storage, release or maintenance of the picture, image, tape or CD.   7.  I consent to the presence of a  or observers in the operating room as deemed necessary by my physician or their designees.    8.  I recognize that in the event my procedure results in extended X-Ray/fluoroscopy time, I may develop a skin reaction.    9.  If I have a Do Not Attempt Resuscitation (DNAR) order in place,  that status will be suspended while in the operating room, procedural suite, and during the recovery period unless otherwise explicitly stated by me (or a person authorized to consent on my behalf). The performing physician or my attending physician will determine when the applicable recovery period ends for purposes of reinstating the DNAR order.  10.  I acknowledge that my physician has explained sedation/analgesia administration to me including the risk and benefits I consent to the administration of sedation/analgesia as may be necessary or desirable in the judgment of my physician.      I CERTIFY THAT I HAVE READ AND FULLY UNDERSTAND THE ABOVE CONSENT FOR THE PROCEDURE.   Signature of Patient: _____________________________________________________________  Responsible person in case of minor, unconscious: ____________________________________  Relationship to patient:  __________________________________________________________  Signature of Witness: _______________________________Date: _________Time: __________    Statement of Physician: My signature below affirms that prior to the time of the procedure, I have explained to the patient and/or her guardian, the risks and benefits involved in the proposed treatment and any reasonable alternative to the proposed treatment. I have also explained the risks and benefits involved in the refusal of the proposed treatment and have answered the patient's questions. If I have a significant financial interest in a co-management agreement or a significant financial interest in any product or implant, or other significant relationship used in the procedure/surgery, I have disclosed this and had a discussion with my patient.  Signature of Physician:   _________________________________Date:_____________Time:________    Patient Name: Beatriz Rosario : 1950  Printed: March 3, 2025   Medical Record #: [id number]

## (undated) NOTE — MR AVS SNAPSHOT
[address] [facility] [address]  [phone]               Thank you for choosing us for your health care visit with Lyssa Zheng MD.  We are glad to serve you and happy to provide you with this summary of your insurance company's guidelines for prior authorization for this test.  You may be held responsible for payment in full if proper authorization is not acquired.   Please contact the Patient Business Office at [phone] if you have any questions related to Commonly known as:  LOFIBRA           latanoprost 0.005 % Soln   Commonly known as:  XALATAN           MULTI-DAY Tabs   Take  by mouth.            Triamterene-HCTZ 37.5-25 MG Tabs   TAKE 1 TABLET BY MOUTH DAILY   Commonly known as:  [id number]

## (undated) NOTE — LETTER
AUTHORIZATION FOR SURGICAL OPERATION OR OTHER PROCEDURE    1. I hereby authorize Dr. Kvng Dutta MD, and [facility] staff assigned to my case to perform the following operation and/or procedure at the [facility]:    _______________________________________________________________________________________________    Right shoulder cortisone injection  _______________________________________________________________________________________________    2. My physician has explained the nature and purpose of the operation or other procedure, possible alternative methods of treatment, the risks involved, and the possibility of complication to me. I acknowledge that no guarantee has been made as to the result that may be obtained. 3.  I recognize that, during the course of this operation, or other procedure, unforseen conditions may necessitate additional or different procedure than those listed above. I, therefore, further authorize and request that the above named physician, his/her physician assistants or designees perform such procedures as are, in his/her professional opinion, necessary and desirable. 4.  Any tissue or organs removed in the operation or other procedure may be disposed of by and at the discretion of the [facility] and [facility] AT [facility]. 5.  I understand that in the event of a medical emergency, I will be transported by local paramedics to [facility] or other hospital emergency department. 6.  I certify that I have read and fully understand the above consent to operation and/or other procedure. 7.  I acknowledge that my physician has explained sedation/analgesia administration to me including the risks and benefits. I consent to the administration of sedation/analgesia as may be necessary or desirable in the judgement of my physician.     Witness signature: ___________________________________________________ Date:  _02_/_01__/_2022_ Time:  _12:10___ P. M. Patient Name:  ______________________________________________________  (please print)      Patient signature:  ___________________________________________________             Relationship to Patient:           []  Parent    Responsible person                          []  Spouse  In case of minor or                    [] Other  _____________   Incompetent name:  __________________________________________________                               (please print)      _____________      Responsible person  In case of minor or  Incompetent signature:  _______________________________________________    Statement of Physician  My signature below affirms that prior to the time of the procedure, I have explained to the patient and/or his/her guardian, the risks and benefits involved in the proposed treatment and any reasonable alternative to the proposed treatment. I have also explained the risks and benefits involved in the refusal of the proposed treatment and have answered the patient's questions.                         Date:  ______/______/_______  Provider                      Signature:  __________________________________________________________       Time:  ___________ A.M    P.M.

## (undated) NOTE — LETTER
09/28/21        [address] [facility] [address]      Dear Avni Poster,    Our records indicate that you have outstanding lab work and or testing that was ordered for you and has not yet been completed:  Orders Harini

## (undated) NOTE — LETTER
8/31/2022              [employer]        [address]         [facility] [address]         To whom it may concern,    This is to certify that Ms Adelita Wong is our patient and under our care. 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        Sincerely,    MD Bruno Peterson Eduin , [address], [address] [place] [place] [address]  [phone]        Document electronically generated by:  Sharon King MD

## (undated) NOTE — LETTER
Date & Time: 11/4/2023, 12:33 PM  Patient: Pauline Rosario  Encounter Provider(s):    Marisol Amaya PA-C       To Whom It May Concern:    Beatriz Rosario was seen and treated in our department on 11/4/2023. She can return to work 11/10/2023.     If you have any questions or concerns, please do not hesitate to call.        _____________________________  Physician/APC Signature

## (undated) NOTE — LETTER
10/01/20        Brie Chao [address] [facility] [address]      Dear Misty Torres records indicate that you have outstanding lab work and or testing that was ordered for you and has not yet been completed:  Orders Placed T

## (undated) NOTE — Clinical Note
Has taken Raloxifene for one month. Notes elsie CONNELLY mild edema more consistently since starting it, resolves when elevates. She is worried about side effect of blood clots espec with [facility] travel this summer. Does take a daily baby ASA. Otherwise has no other side effects. Sees you 11/28raffi mammog in Nov. thx

## (undated) NOTE — LETTER
3/24/2023              [employer]        [address]         [facility] [address]         To whom it may concern,      This is to certify that Ms Guillermo Llanos was seen and examined in our clinic today. 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        Sincerely,    Willy Brown MD  [facility], [address], [address]  [phone]        Document electronically generated by:  Willy Brown MD

## (undated) NOTE — LETTER
12/03/18        [address] [facility] [place] [address]      Dear Mumtaz Knapp records indicate that you have outstanding lab work and or testing that was ordered for you and has not yet been completed:  Orders Placed T

## (undated) NOTE — LETTER
AUTHORIZATION FOR SURGICAL OPERATION OR OTHER PROCEDURE    1. I hereby authorize Dr. Elia Benitez, and [facility] staff assigned to my case to perform the following operation and/or procedure at the [facility]:    _______________________________________________________________________________________________    Right Shoulder Cortisone Injection  _______________________________________________________________________________________________    2. My physician has explained the nature and purpose of the operation or other procedure, possible alternative methods of treatment, the risks involved, and the possibility of complication to me. I acknowledge that no guarantee has been made as to the result that may be obtained. 3.  I recognize that, during the course of this operation, or other procedure, unforseen conditions may necessitate additional or different procedure than those listed above. I, therefore, further authorize and request that the above named physician, his/her physician assistants or designees perform such procedures as are, in his/her professional opinion, necessary and desirable. 4.  Any tissue or organs removed in the operation or other procedure may be disposed of by and at the discretion of the [facility] and [facility] AT [facility]. 5.  I understand that in the event of a medical emergency, I will be transported by local paramedics to [facility] or other hospital emergency department. 6.  I certify that I have read and fully understand the above consent to operation and/or other procedure. 7.  I acknowledge that my physician has explained sedation/analgesia administration to me including the risks and benefits. I consent to the administration of sedation/analgesia as may be necessary or desirable in the judgement of my physician.     Witness signature: ___________________________________________________ Date:  ______/______/_____ Time:  ________ A. M.  P.M. Patient Name:  ______________________________________________________  (please print)      Patient signature:  ___________________________________________________             Relationship to Patient:           []  Parent    Responsible person                          []  Spouse  In case of minor or                    [] Other  _____________   Incompetent name:  __________________________________________________                               (please print)      _____________      Responsible person  In case of minor or  Incompetent signature:  _______________________________________________    Statement of Physician  My signature below affirms that prior to the time of the procedure, I have explained to the patient and/or his/her guardian, the risks and benefits involved in the proposed treatment and any reasonable alternative to the proposed treatment. I have also explained the risks and benefits involved in the refusal of the proposed treatment and have answered the patient's questions.                         Date:  ______/______/_______  Provider                      Signature:  __________________________________________________________       Time:  ___________ A.M    P.M.

## (undated) NOTE — LETTER
06/17/20        [address] [facility] [address]      Dear Delores Hard records indicate that you have outstanding lab work and or testing that was ordered for you and has not yet been completed:  Orders Placed T

## (undated) NOTE — LETTER
December 22, 2017    [address] [facility]      Dear Janak Reagan:   It was a pleasure speaking with you over the phone recently regarding our Chronic Care Management program. To follow up, I wanted to send

## (undated) NOTE — LETTER
10/18/19        Zaki Chao [address] [facility] [address]      Dear Kait Stewart records indicate that you have outstanding lab work and or testing that was ordered for you and has not yet been completed:  Orders Placed T

## (undated) NOTE — LETTER
[facility], [address] [facility] [address]  [address] [facility], [address]       11/09/18        Patient: Cuauhtemoc Serrano Co   YOB: 1950   Date of Visit: 11/9/2018       Dear  Dr. Walter Rivas MD,      Thank you for referring Cuauhtemoc Rosario t

## (undated) NOTE — LETTER
Lee Núñez, [address] [facility] [address]  [address] [facility], [address]       11/09/18        Patient: Slim Friday Co   YOB: 1950   Date of Visit: 11/9/2018       Dear  Dr. Zaheer Mcneil MD,      Thank you for referring [facility] Friday [facility] t

## (undated) NOTE — LETTER
5/15/2025          Beatriz Fisher Co    [address]     [facility] [address]*         Dear Beatriz,    Our records indicate that the tests ordered for you by Marcos Sloan MD  have not been done.  If you have, in fact, already completed the tests or you do not wish to have the tests done, please contact our office at THE NUMBER LISTED BELOW.  Otherwise, please proceed with the testing.  Enclosed is a duplicate order for your convenience.    Imaging Order:      MRI MRCP (W+WO) (CPT=74183) (Order #[id number]) on 4/15/25      To Schedule a test at [facility]  Please call Central Scheduling At [phone] Monday through Friday between 7:30 am to 6:00 pm and on Saturday 8:00 am to 1:00 pm        Sincerely,    Marcos Sloan MD  [facility], [facility], [place]  [address] [facility]  [facility] [id number] [phone]

## (undated) NOTE — LETTER
[facility]  [address], [place], [place]    Authorization for Surgical Operation and Procedure                               I hereby authorize Licha Lind MD, my physician and his/her assistants (if applicable), which may include medical students, residents, and/or fellows, to perform the following surgical operation/ procedure and administer such anesthesia as may be determined necessary by my physician: Operation/Procedure name (s) Right breast lumpectomy on Beatriz Deomano Co   2.   I recognize that during the surgical operation/procedure, unforeseen conditions may necessitate additional or different procedures than those listed above.  I, therefore, further authorize and request that the above-named surgeon, assistants, or designees perform such procedures as are, in their judgment, necessary and desirable.    3.   My surgeon/physician has discussed prior to my surgery the potential benefits, risks and side effects of this procedure; the likelihood of achieving goals; and potential problems that might occur during recuperation.  They also discussed reasonable alternatives to the procedure, including risks, benefits, and side effects related to the alternatives and risks related to not receiving this procedure.  I have had all my questions answered and I acknowledge that no guarantee has been made as to the result that may be obtained.    4.   Should the need arise during my operation/procedure, which includes change of level of care prior to discharge, I also consent to the administration of blood and/or blood products.  Further, I understand that despite careful testing and screening of blood or blood products by collecting agencies, I may still be subject to ill effects as a result of receiving a blood transfusion and/or blood products.  The following are some, but not all, of the potential risks that can occur: fever and allergic reactions, hemolytic reactions, transmission of  diseases such as Hepatitis, AIDS and Cytomegalovirus (CMV) and fluid overload.  In the event that I wish to have an autologous transfusion of my own blood, or a directed donor transfusion, I will discuss this with my physician.  Check only if Refusing Blood or Blood Products  I understand refusal of blood or blood products as deemed necessary by my physician may have serious consequences to my condition to include possible death. I hereby assume responsibility for my refusal and release the hospital, its personnel, and my physicians from any responsibility for the consequences of my refusal.    o  Refuse   5.   I authorize the use of any specimen, organs, tissues, body parts or foreign objects that may be removed from my body during the operation/procedure for diagnosis, research or teaching purposes and their subsequent disposal by hospital authorities.  I also authorize the release of specimen test results and/or written reports to my treating physician on the hospital medical staff or other referring or consulting physicians involved in my care, at the discretion of the Pathologist or my treating physician.    6.   I consent to the photographing or videotaping of the operations or procedures to be performed, including appropriate portions of my body for medical, scientific, or educational purposes, provided my identity is not revealed by the pictures or by descriptive texts accompanying them.  If the procedure has been photographed/videotaped, the surgeon will obtain the original picture, image, videotape or CD.  The hospital will not be responsible for storage, release or maintenance of the picture, image, tape or CD.    7.   I consent to the presence of a  or observers in the operating room as deemed necessary by my physician or their designees.    8.   I recognize that in the event my procedure results in extended X-Ray/fluoroscopy time, I may develop a skin reaction.    9. If I have a Do Not  Attempt Resuscitation (DNAR) order in place, that status will be suspended while in the operating room, procedural suite, and during the recovery period unless otherwise explicitly stated by me (or a person authorized to consent on my behalf). The surgeon or my attending physician will determine when the applicable recovery period ends for purposes of reinstating the DNAR order.  10. Patients having a sterilization procedure: I understand that if the procedure is successful the results will be permanent and it will therefore be impossible for me to inseminate, conceive, or bear children.  I also understand that the procedure is intended to result in sterility, although the result has not been guaranteed.   11. I acknowledge that my physician has explained sedation/analgesia administration to me including the risk and benefits I consent to the administration of sedation/analgesia as may be necessary or desirable in the judgment of my physician.    I CERTIFY THAT I HAVE READ AND FULLY UNDERSTAND THE ABOVE CONSENT TO OPERATION and/or OTHER PROCEDURE.     ____________________________________  _________________________________        ______________________________  Signature of Patient    Signature of Responsible Person                Printed Name of Responsible Person                                      ____________________________________  _____________________________                ________________________________  Signature of Witness        Date  Time         Relationship to Patient    STATEMENT OF PHYSICIAN My signature below affirms that prior to the time of the procedure; I have explained to the patient and/or his/her legal representative, the risks and benefits involved in the proposed treatment and any reasonable alternative to the proposed treatment. I have also explained the risks and benefits involved in refusal of the proposed treatment and alternatives to the proposed treatment and have answered the  patient's questions. If I have a significant financial interest in a co-management agreement or a significant financial interest in any product or implant, or other significant relationship used in this procedure/surgery, I have disclosed this and had a discussion with my patient.     _____________________________________________________              _____________________________  (Signature of Physician)                                                                                         (Date)                                   (Time)  Patient Name: Beatriz Fisher Co      : 1950      Printed: 2025     Medical Record #: [id number]                                      Page 1 of 1

## (undated) NOTE — LETTER
Erin Márquez, [address] [address],[address]  [address] [facility], [address] [facility]       09/06/19        Patient: Neo Rosario   YOB: 1950   Date of Visit: 9/6/2019       Dear  Dr. Daryle Draft, MD,      Thank you for referring Neo Rosario

## (undated) NOTE — LETTER
06/28/21        [address] [facility] [address]      Dear Ruben Chao,    [address] [facility] records indicate that you have outstanding lab work and or testing that was ordered for you and has not yet been completed:  Orders Harini

## (undated) NOTE — LETTER
[facility]  Administration of Anesthesia  Beatriz MCKEON agree to be cared for by a physician anesthesiologist alone and/or with a nurse anesthetist, who is specially trained to monitor me and give me medicine to put me to sleep or keep me comfortable during my procedure    I understand that my anesthesiologist and/or anesthetist is not an employee or agent of [facility] or [employer]. He or she works for [facility]    As the patient asking for anesthesia services, I agree to:  Allow the anesthesiologist (anesthesia doctor) to give me medicine and do additional procedures as necessary. Some examples are: Starting or using an “IV” to give me medicine, fluids or blood during my procedure, and having a breathing tube placed to help me breathe when I’m asleep (intubation). In the event that my heart stops working properly, I understand that my anesthesiologist will make every effort to sustain my life, unless otherwise directed by [facility] Do Not Resuscitate documents.  Tell my anesthesia doctor before my procedure:  If I am pregnant.  The last time that I ate or drank.  iii. All of the medicines I take (including prescriptions, herbal supplements, and pills I can buy without a prescription (including street drugs/illegal medications). Failure to inform my anesthesiologist about these medicines may increase my risk of anesthetic complications.  iv.If I am allergic to anything or have had a reaction to anesthesia before.  I understand how the anesthesia medicine will help me (benefits).  I understand that with any type of anesthesia medicine there are risks:  The most common risks are: nausea, vomiting, sore throat, muscle soreness, damage to my eyes, mouth, or teeth (from breathing tube placement).  Rare risks include: remembering what happened during my procedure, allergic reactions to medications, injury to my airway, heart, lungs, vision, nerves, or  muscles and in extremely rare instances death.  My doctor has explained to me other choices available to me for my care (alternatives).  Pregnant Patients (“epidural”):  I understand that the risks of having an epidural (medicine given into my back to help control pain during labor), include itching, low blood pressure, difficulty urinating, headache or slowing of the baby’s heart. Very rare risks include infection, bleeding, seizure, irregular heart rhythms and nerve injury.  Regional Anesthesia (“spinal”, “epidural”, & “nerve blocks”):  I understand that rare but potential complications include headache, bleeding, infection, seizure, irregular heart rhythms, and nerve injury.    _____________________________________________________________________________  Patient (or Representative) Signature/Relationship to Patient  Date   Time    _____________________________________________________________________________   Name (if used)    Language/Organization   Time    _____________________________________________________________________________  Nurse Anesthetist Signature     Date   Time  _____________________________________________________________________________  Anesthesiologist Signature     Date   Time  I have discussed the procedure and information above with the patient (or patient’s representative) and answered their questions. The patient or their representative has agreed to have anesthesia services.    _____________________________________________________________________________  Witness        Date   Time  I have verified that the signature is that of the patient or patient’s representative, and that it was signed before the procedure  Patient Name: Betariz Rosario     : 1950                 Printed: 2025 at 1:05 PM    Medical Record #: [id number]                                            Page 1 of 1  ----------ANESTHESIA CONSENT----------

## (undated) NOTE — LETTER
AUTHORIZATION FOR SURGICAL OPERATION OR OTHER PROCEDURE    1. I hereby authorize Dr. Li, and [facility] staff assigned to my case to perform the following operation and/or procedure at the [facility] site:    Right shoulder cortisone injection   _______________________________________________________________________________________________      _______________________________________________________________________________________________    2.  My physician has explained the nature and purpose of the operation or other procedure, possible alternative methods of treatment, the risks involved, and the possibility of complication to me.  I acknowledge that no guarantee has been made as to the result that may be obtained.  3.  I recognize that, during the course of this operation, or other procedure, unforseen conditions may necessitate additional or different procedure than those listed above.  I, therefore, further authorize and request that the above named physician, his/her physician assistants or designees perform such procedures as are, in his/her professional opinion, necessary and desirable.  4.  Any tissue or organs removed in the operation or other procedure may be disposed of by and at the discretion of the [facility] and [facility].  5.  I understand that in the event of a medical emergency, I will be transported by local paramedics to [facility] or other hospital emergency department.  6.  I certify that I have read and fully understand the above consent to operation and/or other procedure.    7.  I acknowledge that my physician has explained sedation/analgesia administration to me including the risks and benefits.  I consent to the administration of sedation/analgesia as may be necessary or desirable in the judgement of my physician.    Witness signature: ___________________________________________________ Date:   ______/______/_____                    Time:  ________ A.M.  P.M.       Patient Name:  ______________________________________________________  (please print)      Patient signature:  ___________________________________________________             Relationship to Patient:           []  Parent    Responsible person                          []  Spouse  In case of minor or                    [] Other  _____________   Incompetent name:  __________________________________________________                               (please print)      _____________      Responsible person  In case of minor or  Incompetent signature:  _______________________________________________    Statement of Physician  My signature below affirms that prior to the time of the procedure, I have explained to the patient and/or his/her guardian, the risks and benefits involved in the proposed treatment and any reasonable alternative to the proposed treatment.  I have also explained the risks and benefits involved in the refusal of the proposed treatment and have answered the patient's questions.                        Date:  ______/______/_______  Provider                      Signature:  __________________________________________________________       Time:  ___________ A.M    P.M.

## (undated) NOTE — LETTER
03/08/21        Rogerio Chao [address] [facility] [address]      Dear Jesus Smith records indicate that you have outstanding lab work and or testing that was ordered for you and has not yet been completed:  Orders Placed T

## (undated) NOTE — LETTER
May 22, 2020    [address] [place]   Your vit B12 was normal.   Your labs show cbc, vit d, and chemistries were good.    Your a1c was 6.4 so diabetes well controlled and your cholestero Vitamin D, 25OH, Total 30.3 30.0 - 100.0 ng/mL   CBC W/ DIFFERENTIAL   Result Value Ref Range    WBC 7.9 4.0 - 11.0 x10(3) uL    RBC 4.20 3.80 - 5.30 x10(6)uL    HGB 12.4 12.0 - 16.0 g/dL    HCT 37.9 35.0 - 48.0 %    MCV 90.2 80.0 - 100.0 fL    MCH 29.5 2